# Patient Record
Sex: MALE | Race: WHITE | NOT HISPANIC OR LATINO | Employment: FULL TIME | URBAN - METROPOLITAN AREA
[De-identification: names, ages, dates, MRNs, and addresses within clinical notes are randomized per-mention and may not be internally consistent; named-entity substitution may affect disease eponyms.]

---

## 2018-03-26 ENCOUNTER — PATIENT MESSAGE (OUTPATIENT)
Dept: INTERNAL MEDICINE | Facility: CLINIC | Age: 63
End: 2018-03-26

## 2018-03-26 DIAGNOSIS — Z00.00 ANNUAL PHYSICAL EXAM: Primary | ICD-10-CM

## 2018-03-26 DIAGNOSIS — Z12.5 ENCOUNTER FOR PROSTATE CANCER SCREENING: ICD-10-CM

## 2018-03-29 ENCOUNTER — LAB VISIT (OUTPATIENT)
Dept: LAB | Facility: HOSPITAL | Age: 63
End: 2018-03-29
Attending: INTERNAL MEDICINE
Payer: COMMERCIAL

## 2018-03-29 DIAGNOSIS — Z00.00 ANNUAL PHYSICAL EXAM: ICD-10-CM

## 2018-03-29 DIAGNOSIS — Z12.5 ENCOUNTER FOR PROSTATE CANCER SCREENING: ICD-10-CM

## 2018-03-29 LAB
ALBUMIN SERPL BCP-MCNC: 4.1 G/DL
ALP SERPL-CCNC: 107 U/L
ALT SERPL W/O P-5'-P-CCNC: 34 U/L
ANION GAP SERPL CALC-SCNC: 8 MMOL/L
AST SERPL-CCNC: 20 U/L
BASOPHILS # BLD AUTO: 0.03 K/UL
BASOPHILS NFR BLD: 0.5 %
BILIRUB SERPL-MCNC: 0.6 MG/DL
BUN SERPL-MCNC: 21 MG/DL
CALCIUM SERPL-MCNC: 10.2 MG/DL
CHLORIDE SERPL-SCNC: 106 MMOL/L
CHOLEST SERPL-MCNC: 236 MG/DL
CHOLEST/HDLC SERPL: 4.1 {RATIO}
CO2 SERPL-SCNC: 28 MMOL/L
COMPLEXED PSA SERPL-MCNC: 0.76 NG/ML
CREAT SERPL-MCNC: 0.9 MG/DL
DIFFERENTIAL METHOD: NORMAL
EOSINOPHIL # BLD AUTO: 0.3 K/UL
EOSINOPHIL NFR BLD: 4.7 %
ERYTHROCYTE [DISTWIDTH] IN BLOOD BY AUTOMATED COUNT: 13.1 %
EST. GFR  (AFRICAN AMERICAN): >60 ML/MIN/1.73 M^2
EST. GFR  (NON AFRICAN AMERICAN): >60 ML/MIN/1.73 M^2
GLUCOSE SERPL-MCNC: 103 MG/DL
HCT VFR BLD AUTO: 44.3 %
HDLC SERPL-MCNC: 58 MG/DL
HDLC SERPL: 24.6 %
HGB BLD-MCNC: 15 G/DL
LDLC SERPL CALC-MCNC: 157.4 MG/DL
LYMPHOCYTES # BLD AUTO: 1.5 K/UL
LYMPHOCYTES NFR BLD: 24.3 %
MCH RBC QN AUTO: 30.4 PG
MCHC RBC AUTO-ENTMCNC: 33.9 G/DL
MCV RBC AUTO: 90 FL
MONOCYTES # BLD AUTO: 0.5 K/UL
MONOCYTES NFR BLD: 8.3 %
NEUTROPHILS # BLD AUTO: 3.8 K/UL
NEUTROPHILS NFR BLD: 62.2 %
NONHDLC SERPL-MCNC: 178 MG/DL
PLATELET # BLD AUTO: 264 K/UL
PMV BLD AUTO: 9.2 FL
POTASSIUM SERPL-SCNC: 4.8 MMOL/L
PROT SERPL-MCNC: 7.4 G/DL
RBC # BLD AUTO: 4.94 M/UL
SODIUM SERPL-SCNC: 142 MMOL/L
TRIGL SERPL-MCNC: 103 MG/DL
TSH SERPL DL<=0.005 MIU/L-ACNC: 1.94 UIU/ML
WBC # BLD AUTO: 6.12 K/UL

## 2018-03-29 PROCEDURE — 84443 ASSAY THYROID STIM HORMONE: CPT

## 2018-03-29 PROCEDURE — 84153 ASSAY OF PSA TOTAL: CPT

## 2018-03-29 PROCEDURE — 36415 COLL VENOUS BLD VENIPUNCTURE: CPT

## 2018-03-29 PROCEDURE — 80061 LIPID PANEL: CPT

## 2018-03-29 PROCEDURE — 80053 COMPREHEN METABOLIC PANEL: CPT

## 2018-03-29 PROCEDURE — 85025 COMPLETE CBC W/AUTO DIFF WBC: CPT

## 2018-04-03 ENCOUNTER — HOSPITAL ENCOUNTER (OUTPATIENT)
Dept: RADIOLOGY | Facility: HOSPITAL | Age: 63
Discharge: HOME | End: 2018-04-03
Attending: INTERNAL MEDICINE
Payer: COMMERCIAL

## 2018-04-03 ENCOUNTER — OFFICE VISIT (OUTPATIENT)
Dept: INTERNAL MEDICINE | Facility: CLINIC | Age: 63
End: 2018-04-03
Payer: COMMERCIAL

## 2018-04-03 VITALS
WEIGHT: 231.06 LBS | BODY MASS INDEX: 29.65 KG/M2 | HEIGHT: 74 IN | SYSTOLIC BLOOD PRESSURE: 160 MMHG | HEART RATE: 69 BPM | DIASTOLIC BLOOD PRESSURE: 79 MMHG

## 2018-04-03 DIAGNOSIS — I10 ESSENTIAL HYPERTENSION: ICD-10-CM

## 2018-04-03 DIAGNOSIS — Z12.11 SCREEN FOR COLON CANCER: ICD-10-CM

## 2018-04-03 DIAGNOSIS — Z00.00 ANNUAL PHYSICAL EXAM: Primary | ICD-10-CM

## 2018-04-03 DIAGNOSIS — M25.561 ACUTE PAIN OF RIGHT KNEE: ICD-10-CM

## 2018-04-03 DIAGNOSIS — E78.00 PURE HYPERCHOLESTEROLEMIA: ICD-10-CM

## 2018-04-03 PROCEDURE — 93005 ELECTROCARDIOGRAM TRACING: CPT | Mod: S$GLB,,, | Performed by: INTERNAL MEDICINE

## 2018-04-03 PROCEDURE — 73560 X-RAY EXAM OF KNEE 1 OR 2: CPT | Mod: 26,RT,, | Performed by: RADIOLOGY

## 2018-04-03 PROCEDURE — 93010 ELECTROCARDIOGRAM REPORT: CPT | Mod: S$GLB,,, | Performed by: INTERNAL MEDICINE

## 2018-04-03 PROCEDURE — 99396 PREV VISIT EST AGE 40-64: CPT | Mod: S$GLB,,, | Performed by: INTERNAL MEDICINE

## 2018-04-03 PROCEDURE — 99999 PR PBB SHADOW E&M-EST. PATIENT-LVL III: CPT | Mod: PBBFAC,,, | Performed by: INTERNAL MEDICINE

## 2018-04-03 PROCEDURE — 73560 X-RAY EXAM OF KNEE 1 OR 2: CPT | Mod: TC,FY,PO,RT
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
## 2018-04-03 NOTE — PROGRESS NOTES
No fracture was seen      a calcification was seen that was consistent with enthesophyte    which is a calcification along the tendon around the kneecap       no further interventions needed other than you can take Advil or Aleve as needed for pain or that of a cold pack over the area

## 2018-10-02 ENCOUNTER — LAB VISIT (OUTPATIENT)
Dept: LAB | Facility: HOSPITAL | Age: 63
End: 2018-10-02
Attending: INTERNAL MEDICINE
Payer: COMMERCIAL

## 2018-10-02 DIAGNOSIS — E78.00 PURE HYPERCHOLESTEROLEMIA: ICD-10-CM

## 2018-10-02 DIAGNOSIS — I10 ESSENTIAL HYPERTENSION: ICD-10-CM

## 2018-10-02 LAB
ANION GAP SERPL CALC-SCNC: 11 MMOL/L
BUN SERPL-MCNC: 21 MG/DL
CALCIUM SERPL-MCNC: 9.6 MG/DL
CHLORIDE SERPL-SCNC: 106 MMOL/L
CHOLEST SERPL-MCNC: 249 MG/DL
CHOLEST/HDLC SERPL: 4.3 {RATIO}
CO2 SERPL-SCNC: 24 MMOL/L
CREAT SERPL-MCNC: 0.9 MG/DL
EST. GFR  (AFRICAN AMERICAN): >60 ML/MIN/1.73 M^2
EST. GFR  (NON AFRICAN AMERICAN): >60 ML/MIN/1.73 M^2
GLUCOSE SERPL-MCNC: 97 MG/DL
HDLC SERPL-MCNC: 58 MG/DL
HDLC SERPL: 23.3 %
LDLC SERPL CALC-MCNC: 167.4 MG/DL
NONHDLC SERPL-MCNC: 191 MG/DL
POTASSIUM SERPL-SCNC: 4.4 MMOL/L
SODIUM SERPL-SCNC: 141 MMOL/L
TRIGL SERPL-MCNC: 118 MG/DL

## 2018-10-02 PROCEDURE — 36415 COLL VENOUS BLD VENIPUNCTURE: CPT

## 2018-10-02 PROCEDURE — 80048 BASIC METABOLIC PNL TOTAL CA: CPT

## 2018-10-02 PROCEDURE — 80061 LIPID PANEL: CPT

## 2018-10-25 ENCOUNTER — PATIENT MESSAGE (OUTPATIENT)
Dept: ADMINISTRATIVE | Facility: OTHER | Age: 63
End: 2018-10-25

## 2018-10-26 ENCOUNTER — OFFICE VISIT (OUTPATIENT)
Dept: INTERNAL MEDICINE | Facility: CLINIC | Age: 63
End: 2018-10-26
Payer: COMMERCIAL

## 2018-10-26 VITALS
BODY MASS INDEX: 29.13 KG/M2 | OXYGEN SATURATION: 98 % | DIASTOLIC BLOOD PRESSURE: 77 MMHG | WEIGHT: 227 LBS | HEIGHT: 74 IN | HEART RATE: 62 BPM | SYSTOLIC BLOOD PRESSURE: 120 MMHG

## 2018-10-26 DIAGNOSIS — I10 ESSENTIAL HYPERTENSION: Primary | ICD-10-CM

## 2018-10-26 DIAGNOSIS — E78.00 PURE HYPERCHOLESTEROLEMIA: ICD-10-CM

## 2018-10-26 PROCEDURE — 3008F BODY MASS INDEX DOCD: CPT | Mod: CPTII,S$GLB,, | Performed by: INTERNAL MEDICINE

## 2018-10-26 PROCEDURE — 3074F SYST BP LT 130 MM HG: CPT | Mod: CPTII,S$GLB,, | Performed by: INTERNAL MEDICINE

## 2018-10-26 PROCEDURE — 99999 PR PBB SHADOW E&M-EST. PATIENT-LVL III: CPT | Mod: PBBFAC,,, | Performed by: INTERNAL MEDICINE

## 2018-10-26 PROCEDURE — 3078F DIAST BP <80 MM HG: CPT | Mod: CPTII,S$GLB,, | Performed by: INTERNAL MEDICINE

## 2018-10-26 PROCEDURE — 99214 OFFICE O/P EST MOD 30 MIN: CPT | Mod: S$GLB,,, | Performed by: INTERNAL MEDICINE

## 2018-10-26 NOTE — PROGRESS NOTES
PAST MEDICAL HISTORY:  Hypertension.  Hyperlipidemia.  Fatty liver.  Lumbar degenerative disc disease.    SOCIAL HISTORY:  Tobacco use, none.  Alcohol use, none.  , has two   children.  Works in the oil field business.  Exercises by walking, but it has   been limited.    FAMILY HISTORY:  Mother is , pancreatic and liver cancer.  Father is   still living, bypass surgery.  Two sisters, both with hyperlipidemia.  One   daughter with diabetes.    SCREENING:  Last colonoscopy in .    MEDICATIONS:  None.          REASON FOR VISIT:  This is a 63-year-old male who comes in for a followup visit.    He seems to be sporadic in regard to exercise and diet habits.  Also, later   today, he will be meeting with the Digital Hypertension Medicine Program.    On recent labs, his chemistry was fine, but his cholesterol still remains   elevated at 249 with .    Again, he emphatically states that he is not into taking medication and does not   want to be on medicine for both blood pressure and cholesterol at present.  He   also in an emphatic way is saying that he and his wife are definitely now   getting onto a better diet that they will be starting.    SYSTEMS REVIEW:  Endorses no pains in the chest, shortness of breath or   abdominal pain.  Regular bowel function.  There has been no difficulty   urinating.  Does report snoring by his wife, nothing been observed with apnea,   but he is not fatigued, tired, somnolent upon awakened or as the day goes on.    PHYSICAL EXAMINATION:  VITAL SIGNS:  Weight is 227 pounds, pulse 64, blood pressure by me 158/78,   similar.  NECK:  No thyromegaly.  LUNGS:  Clear.  HEART:  Regular rate and rhythm.  ABDOMEN:  Active bowel sounds, soft, nontender.  No hepatosplenomegaly or   abdominal masses.  PULSES:  2+ carotid pulses, no bruits.  EXTREMITIES:  No edema.    IMPRESSION:  1.  Hypertension.  2.  Hyperlipidemia.    PLAN:  Follow up with the Digital Hypertension Medicine  Program, see how his   blood pressure readings are as he goes about his normal routine.  Again,   discussed the importance of trying to correct the cholesterol and dietary habits   were discussed.  Return again in six months.        /anh [id number] review        DERECK/SIM  dd: 10/26/2018 08:31:04 (CDT)  td: 10/26/2018 21:12:10 (CDT)  Doc ID   #[id number]  Job ID #[id number]    CC:

## 2018-11-09 ENCOUNTER — PATIENT OUTREACH (OUTPATIENT)
Dept: OTHER | Facility: OTHER | Age: 63
End: 2018-11-09

## 2018-11-09 NOTE — PROGRESS NOTES
Last 5 Patient Entered Readings                                      Current 30 Day Average: 133/75     Recent Readings 11/9/2018 11/8/2018 11/7/2018 11/6/2018 11/5/2018    SBP (mmHg) 132 116 124 118 144    DBP (mmHg) 69 67 67 81 73    Pulse 55 62 61 61 53        11/9-Initial enrollment completed. Confirmed proper BP technique, timing, and body positioning. Patient wants to stay off BP medication as much as possible-he is currently not prescribed BP med. Welcome letter sent.

## 2018-11-09 NOTE — LETTER
Melania Todd PharmD  [address] [place] [address]     Dear Leonardo Pisano,    Welcome to the [facility]!           My name is Melania Todd PharmD and I am your dedicated Digital Medicine clinician.  As an expert in medication management, I will help ensure that the medications you are taking continue to provide you with the intended benefits.        I am Patric Marie and I will be your health  for the duration of the program.  My  job is to help you identify lifestyle changes to improve your blood pressure control.  We will talk about nutrition, exercise, and other ways that you may be able to adjust your current habits to better your health. Together, we will work to improve your overall health and encourage you to meet your goals for a healthier lifestyle.    What we expect from YOU:    You will need to take blood pressure readings multiple times a week and no less than one reading per week.   It is important that you take your measurements at different times during the day, when possible.     What you should expect from your Digital Medicine Care Team:   We will provide you with education about high blood pressure, including lifestyle changes that could help you to control your blood pressure.   We will review your weekly readings and provide you with monthly blood pressure progress reports after you have been in the program for more than 30 days.   We will send monthly progress reports on your blood pressure control to your physician so they can follow along with your progress as well.    You will be able to reach me by phone at  or through your [facility] account by clicking my name under Care Team on the right side of the home screen.    I look forward to working with you to achieve your blood pressure goals!    Sincerely,    Melania Todd PharmD  Your personal clinician    Please visit [email or web address] to learn  more about high blood pressure and what you can do lower your blood pressure.                                                                                           Leonardo Pisano  [address]  [place] [address]

## 2018-11-21 ENCOUNTER — TELEPHONE (OUTPATIENT)
Dept: ENDOSCOPY | Facility: HOSPITAL | Age: 63
End: 2018-11-21

## 2018-11-26 ENCOUNTER — PATIENT OUTREACH (OUTPATIENT)
Dept: OTHER | Facility: OTHER | Age: 63
End: 2018-11-26

## 2018-11-26 NOTE — PROGRESS NOTES
Last 5 Patient Entered Readings                                      Current 30 Day Average: 137/74     Recent Readings 11/26/2018 11/25/2018 11/20/2018 11/19/2018 11/18/2018    SBP (mmHg) 132 142 168 160 141    DBP (mmHg) 70 77 77 77 65    Pulse 57 60 55 58 58          Digital Medicine: Health  Introduction Call     11/26: Left voicemail and requested call back in order to complete digital medicine health  introduction call.

## 2018-12-03 NOTE — PROGRESS NOTES
"Last 5 Patient Entered Readings                                      Current 30 Day Average: 137/73     Recent Readings 12/2/2018 11/30/2018 11/29/2018 11/28/2018 11/27/2018    SBP (mmHg) 118 125 151 141 132    DBP (mmHg) 73 70 75 75 76    Pulse 66 68 59 57 61          Digital Medicine: Health  Introduction    Lifestyle Modifications:    1.Dietary Modifications (Sodium intake <2,000mg/day, food labels, dining out):   Pt reports that he has "about limited sodium from his diet". Pt reports he "does not add any salt and looks at all of the food labels he is eating." Pt states "he and his wife are doing it together".      2.Physical Activity:   Pt states "he walks in the evenings for half an hour when the weather allows".  Pt states he might a join or "get a piece of equipment for the house".      3.Medication Therapy:   N/A    4.Patient has the following medication side effects/concerns: None  (Frequency/Alleviating factors/Precipitating factors, etc.)     Follow up with Mr. Leonardo Pisano completed. No further questions or concerns. Will continue to follow up to achieve health goals.  Patient is currently not at goal, 137/73 mmHg does exceed <130/80 mmHg.      "

## 2018-12-05 ENCOUNTER — PATIENT OUTREACH (OUTPATIENT)
Dept: OTHER | Facility: OTHER | Age: 63
End: 2018-12-05

## 2018-12-05 NOTE — PROGRESS NOTES
"Last 5 Patient Entered Readings                                      Current 30 Day Average: 137/74     Recent Readings 12/5/2018 12/4/2018 12/3/2018 12/3/2018 12/2/2018    SBP (mmHg) 130 136 145 150 118    DBP (mmHg) 75 74 84 78 73    Pulse 60 58 53 55 66        Called patient for digital medicine clinical pharmacist introduction. Patient is not currently on BP medications and says "I hope to avoid it all costs." He currently takes his BP in the mornings and took it a few times right after a cup of coffee.     1) Discussed proper BP monitoring technique and BP goal  2) Patient knows to contact me with any non-urgent clinical changes or concerns. Go to ED or call [facility] for emergencies.   3) Will defer lifestyle counseling to digital medicine health .   4) Will continue to follow up.     Melania Todd, Pharm.D.   Digital Medicine Clinical Pharmacist  [phone]    "

## 2018-12-28 ENCOUNTER — PATIENT OUTREACH (OUTPATIENT)
Dept: OTHER | Facility: OTHER | Age: 63
End: 2018-12-28

## 2018-12-28 NOTE — PROGRESS NOTES
Last 5 Patient Entered Readings                                      Current 30 Day Average: 138/74     Recent Readings 12/26/2018 12/22/2018 12/20/2018 12/19/2018 12/19/2018    SBP (mmHg) 148 143 144 145 151    DBP (mmHg) 75 77 74 75 80    Pulse 59 58 55 57 51          Digital Medicine: Health  Follow Up    12/28: Left voicemail to follow up with Mr. Leonardo Pisano.  Current BP average 138/74 mmHg is not at goal, <130/80 mmHg.

## 2019-01-07 NOTE — PROGRESS NOTES
"Last 5 Patient Entered Readings                                      Current 30 Day Average: 140/73     Recent Readings 1/4/2019 1/1/2019 12/31/2018 12/26/2018 12/22/2018    SBP (mmHg) 151 146 132 148 143    DBP (mmHg) 75 77 72 75 77    Pulse 59 56 53 59 58          Digital Medicine: Health  Follow Up    Encouraged patient to make sure he is relaxing for 5-10 minutes before taking his BP readings.  Patient states "that is really hard to do".  Patient states he went hunting for a couple of days and came back and his BP was "in the 130s".     Lifestyle Modifications:    1.Dietary Modifications (Sodium intake <2,000mg/day, food labels, dining out):   Patient reports going to restaurants and asking for the food to be "cooked with less salt".  Patient reports he is "getting used to bland food".  I encouraged patient to continue restricting sodium.     2.Physical Activity:   Patient reports he bought a treadmill for the when the weather is bad "he can still put in some mileage".  I encouraged patient to continue to be active.      3.Medication Therapy:   None    4.Patient has the following medication side effects/concerns: None  (Frequency/Alleviating factors/Precipitating factors, etc.)     Follow up with Mr. Leonardo Pisano completed. No further questions or concerns. Will continue to follow up to achieve health goals.  Patient is currently not at goal, 140/73 mmHg does exceed <130/80 mmHg.    "

## 2019-01-08 NOTE — PROGRESS NOTES
Last 5 Patient Entered Readings                                      Current 30 Day Average: 140/73     Recent Readings 1/4/2019 1/1/2019 12/31/2018 12/26/2018 12/22/2018    SBP (mmHg) 151 146 132 148 143    DBP (mmHg) 75 77 72 75 77    Pulse 59 56 53 59 58        BP remains above goal of <130/<80. Patient has been working with health . Will continue to follow up and discuss starting BP medication.     All AnnaD.   [employer] Medicine Clinical Pharmacist  [phone]

## 2019-02-04 ENCOUNTER — PATIENT OUTREACH (OUTPATIENT)
Dept: OTHER | Facility: OTHER | Age: 64
End: 2019-02-04

## 2019-02-04 NOTE — PROGRESS NOTES
"Last 5 Patient Entered Readings                                      Current 30 Day Average: 137/77     Recent Readings 2/4/2019 2/4/2019 2/2/2019 2/1/2019 1/31/2019    SBP (mmHg) 141 161 138 143 127    DBP (mmHg) 77 82 84 76 76    Pulse 59 55 63 58 61          Digital Medicine: Health  Follow Up    Patient states he could "wait and relax longer" before taking his readings and reports he notices that when he does not the readings are higher.  Reminded patient to make sure to sit and relax 5-10 minutes before taking BP readings.     Lifestyle Modifications:    1.Dietary Modifications (Sodium intake <2,000mg/day, food labels, dining out):   Patient reports continuing to limit salt within his diet.  Pt denies needing any other help with nutrition at this time.    2.Physical Activity:   Patient reports he is walking "on a consistent basis" for about 3 weeks now. Patient states "he does a couple miles after dinner" every night.  Patient reports he even "jogs a little bit" and does intervals.  Encouraged patient to keep up the great work.      3.Medication Therapy:   Patient has been compliant with the medication regimen.    4.Patient has the following medication side effects/concerns: None  (Frequency/Alleviating factors/Precipitating factors, etc.)     Follow up with Mr. Leonardo HATFIELD Zohrehsylvester completed. No further questions or concerns. Will continue to follow up to achieve health goals.  Patient is currently not at goal, 137/77 mmHg does exceed <130/80 mmHg.'    "

## 2019-02-28 ENCOUNTER — PATIENT OUTREACH (OUTPATIENT)
Dept: OTHER | Facility: OTHER | Age: 64
End: 2019-02-28

## 2019-02-28 NOTE — PROGRESS NOTES
Last 5 Patient Entered Readings                                      Current 30 Day Average: 137/75     Recent Readings 2/28/2019 2/28/2019 2/27/2019 2/26/2019 2/24/2019    SBP (mmHg) 140 138 132 137 131    DBP (mmHg) 73 73 71 81 76    Pulse 64 62 62 61 59        Called patient for BP follow up. No answer. Left message for call back    Melania Reza, Pharm.D.   [email or web address] Medicine Clinical Pharmacist  [phone]

## 2019-02-28 NOTE — PROGRESS NOTES
Last 5 Patient Entered Readings                                      Current 30 Day Average: 137/75     Recent Readings 2/28/2019 2/28/2019 2/27/2019 2/26/2019 2/24/2019    SBP (mmHg) 140 138 132 137 131    DBP (mmHg) 73 73 71 81 76    Pulse 64 62 62 61 59        Patient returned my call for BP follow up. He is doing well. He is following a low sodium diet and walking ~30 min almost every evening. He prefers to continue with lifestyle changes for at least another month before considering medications to control BP.     1) BP exceeds goal of <130/<80.   2) Patient knows to contact me with any non-urgent clinical changes or concerns. Go to ED or call [facility] for emergencies.   3) Will defer lifestyle counseling to digital medicine health . Continue low sodium diet and exercise  4) Will continue to follow up.     Melania Todd, Pharm.D.   Digital Medicine Clinical Pharmacist  [phone]

## 2019-03-11 ENCOUNTER — PATIENT OUTREACH (OUTPATIENT)
Dept: OTHER | Facility: OTHER | Age: 64
End: 2019-03-11

## 2019-03-11 NOTE — PROGRESS NOTES
Last 5 Patient Entered Readings                                      Current 30 Day Average: 134/74     Recent Readings 3/10/2019 3/9/2019 3/8/2019 3/7/2019 3/4/2019    SBP (mmHg) 124 132 127 136 141    DBP (mmHg) 71 78 69 79 75    Pulse 57 56 58 59 60        3/11: Left voicemail to follow up with Mr. Leonardo Pisano.  Current BP average 134/74 mmHg is not at goal, <130/80 mmHg.  Patient called back.      Digital Medicine: Health  Follow Up    BP continues to trend downward.  Encounter also consisted of talking about patient's wife and her recent medication change, who is also in the program and my patient. Will notify PharmD.      Lifestyle Modifications:    1.Dietary Modifications (Sodium intake <2,000mg/day, food labels, dining out):   Pt denies needing any other help with nutrition at this time.    2.Physical Activity:   Patient reports he has been walking/jogging for 30 minutes every evening.  Gave praises and encouragement to keep up the great work.     3.Medication Therapy:   None    4.Patient has the following medication side effects/concerns: None  (Frequency/Alleviating factors/Precipitating factors, etc.)     Follow up with Mr. Leonardo Pisano completed. No further questions or concerns. Will continue to follow up to achieve health goals.  Patient is currently not at goal, 134/74 mmHg does exceed <130/80 mmHg.

## 2019-04-15 ENCOUNTER — PATIENT OUTREACH (OUTPATIENT)
Dept: OTHER | Facility: OTHER | Age: 64
End: 2019-04-15

## 2019-04-15 NOTE — PROGRESS NOTES
"Last 5 Patient Entered Readings                                      Current 30 Day Average: 132/75     Recent Readings 4/15/2019 4/14/2019 4/14/2019 4/13/2019 4/13/2019    SBP (mmHg) 130 125 138 126 141    DBP (mmHg) 72 71 63 78 81    Pulse 55 58 57 57 58        4/15: Unable to leave voicemail to follow up with Mr. Leonardo Pisano.  Current BP average 132/75 mmHg is not at goal, <130/80 mmHg.  Sending [facility] message.  Patient called back and left VM. Returned call.      Digital Medicine: Health  Follow Up    Patient reports "he will be impatient" and not wait long enough to take his BP.  He will get a higher reading, then rest a little longer and get a lower reading.  Encouraged patient to continue to try to rest and relax before taking his BP readings.  Patient acknowledged.     Lifestyle Modifications:    1.Dietary Modifications (Sodium intake <2,000mg/day, food labels, dining out):   Patient reports he is continuing to make healthier choices.     2.Physical Activity:   Patient reports he continues to try to exercise daily.  Encouraged patient to stay consistent and keep up the great work.     3.Medication Therapy:   None    4.Patient has the following medication side effects/concerns: None  (Frequency/Alleviating factors/Precipitating factors, etc.)     Follow up with Mr. Leonardo Pisano completed. No further questions or concerns. Will continue to follow up to achieve health goals.  Patient is currently not at goal, 132/75 mmHg does exceed <130/80 mmHg.            "

## 2019-04-22 ENCOUNTER — PATIENT MESSAGE (OUTPATIENT)
Dept: INTERNAL MEDICINE | Facility: CLINIC | Age: 64
End: 2019-04-22

## 2019-04-22 NOTE — TELEPHONE ENCOUNTER
Use the lab orders that were put in October 26, 2018    Also at the [facility] in [place]

## 2019-04-25 ENCOUNTER — LAB VISIT (OUTPATIENT)
Dept: LAB | Facility: HOSPITAL | Age: 64
End: 2019-04-25
Attending: INTERNAL MEDICINE
Payer: COMMERCIAL

## 2019-04-25 DIAGNOSIS — E78.00 PURE HYPERCHOLESTEROLEMIA: ICD-10-CM

## 2019-04-25 DIAGNOSIS — I10 ESSENTIAL HYPERTENSION: ICD-10-CM

## 2019-04-25 LAB
ALBUMIN SERPL BCP-MCNC: 4 G/DL (ref 3.5–5.2)
ALP SERPL-CCNC: 106 U/L (ref 55–135)
ALT SERPL W/O P-5'-P-CCNC: 31 U/L (ref 10–44)
ANION GAP SERPL CALC-SCNC: 10 MMOL/L (ref 8–16)
AST SERPL-CCNC: 18 U/L (ref 10–40)
BASOPHILS # BLD AUTO: 0.02 K/UL (ref 0–0.2)
BASOPHILS NFR BLD: 0.3 % (ref 0–1.9)
BILIRUB SERPL-MCNC: 0.6 MG/DL (ref 0.1–1)
BUN SERPL-MCNC: 19 MG/DL (ref 8–23)
CALCIUM SERPL-MCNC: 9.7 MG/DL (ref 8.7–10.5)
CHLORIDE SERPL-SCNC: 108 MMOL/L (ref 95–110)
CHOLEST SERPL-MCNC: 218 MG/DL (ref 120–199)
CHOLEST/HDLC SERPL: 4 {RATIO} (ref 2–5)
CO2 SERPL-SCNC: 24 MMOL/L (ref 23–29)
COMPLEXED PSA SERPL-MCNC: 0.61 NG/ML (ref 0–4)
CREAT SERPL-MCNC: 0.9 MG/DL (ref 0.5–1.4)
DIFFERENTIAL METHOD: ABNORMAL
EOSINOPHIL # BLD AUTO: 0.3 K/UL (ref 0–0.5)
EOSINOPHIL NFR BLD: 4.8 % (ref 0–8)
ERYTHROCYTE [DISTWIDTH] IN BLOOD BY AUTOMATED COUNT: 13.3 % (ref 11.5–14.5)
EST. GFR  (AFRICAN AMERICAN): >60 ML/MIN/1.73 M^2
EST. GFR  (NON AFRICAN AMERICAN): >60 ML/MIN/1.73 M^2
GLUCOSE SERPL-MCNC: 99 MG/DL (ref 70–110)
HCT VFR BLD AUTO: 43.5 % (ref 40–54)
HDLC SERPL-MCNC: 55 MG/DL (ref 40–75)
HDLC SERPL: 25.2 % (ref 20–50)
HGB BLD-MCNC: 14.5 G/DL (ref 14–18)
LDLC SERPL CALC-MCNC: 142.6 MG/DL (ref 63–159)
LYMPHOCYTES # BLD AUTO: 1.8 K/UL (ref 1–4.8)
LYMPHOCYTES NFR BLD: 28.1 % (ref 18–48)
MCH RBC QN AUTO: 30.4 PG (ref 27–31)
MCHC RBC AUTO-ENTMCNC: 33.3 G/DL (ref 32–36)
MCV RBC AUTO: 91 FL (ref 82–98)
MONOCYTES # BLD AUTO: 0.5 K/UL (ref 0.3–1)
MONOCYTES NFR BLD: 7.9 % (ref 4–15)
NEUTROPHILS # BLD AUTO: 3.8 K/UL (ref 1.8–7.7)
NEUTROPHILS NFR BLD: 58.9 % (ref 38–73)
NONHDLC SERPL-MCNC: 163 MG/DL
PLATELET # BLD AUTO: 242 K/UL (ref 150–350)
PMV BLD AUTO: 9.1 FL (ref 9.2–12.9)
POTASSIUM SERPL-SCNC: 4.6 MMOL/L (ref 3.5–5.1)
PROT SERPL-MCNC: 7 G/DL (ref 6–8.4)
RBC # BLD AUTO: 4.77 M/UL (ref 4.6–6.2)
SODIUM SERPL-SCNC: 142 MMOL/L (ref 136–145)
TRIGL SERPL-MCNC: 102 MG/DL (ref 30–150)
TSH SERPL DL<=0.005 MIU/L-ACNC: 2.53 UIU/ML (ref 0.4–4)
WBC # BLD AUTO: 6.44 K/UL (ref 3.9–12.7)

## 2019-04-25 PROCEDURE — 80053 COMPREHEN METABOLIC PANEL: CPT

## 2019-04-25 PROCEDURE — 36415 COLL VENOUS BLD VENIPUNCTURE: CPT

## 2019-04-25 PROCEDURE — 85025 COMPLETE CBC W/AUTO DIFF WBC: CPT

## 2019-04-25 PROCEDURE — 80061 LIPID PANEL: CPT

## 2019-04-25 PROCEDURE — 84153 ASSAY OF PSA TOTAL: CPT

## 2019-04-25 PROCEDURE — 84443 ASSAY THYROID STIM HORMONE: CPT

## 2019-04-25 NOTE — PROGRESS NOTES
PAST MEDICAL HISTORY:  Hypertension.  Hyperlipidemia.  Fatty liver.  Lumbar degenerative disc disease.     SOCIAL HISTORY:  Tobacco use, none.  Alcohol use, none.  , has two children.  Works in the oil field business.  Exercises by walking, but it has been limited.     FAMILY HISTORY:  Mother is , pancreatic and liver cancer.  Father is still living, bypass surgery.  Two sisters, both with hyperlipidemia.  One daughter with diabetes.     SCREENING:  Last colonoscopy in .        REASON FOR VISIT:  This is a 64-year-old male who comes in for an annual routine   visit.  In general, he is feeling well.    He is followed by the Digital Hypertension Medicine program where his systolic   readings were in the 120 to 130s, diastolics in the 70s.    Thing that he brings up is that he feels that his lumbar back situation might be   getting worse where he is feeling more discomfort on consistent basis and wants   to bend over.  He also if he is standing up for any prolonged period is when he   will notice it.  He has had an epidural steroid injection before.    RECENT LABS:  PSA, TSH, CBC, chemistry, normal.  Total cholesterol 218 with LDL   142.    REVIEW OF SYMPTOMS:  Endorses no chest pain, palpitations, shortness of breath,   abdominal pain, regular bowel function.  No difficulty urinating.  No headaches,   indigestion or heartburn.    PHYSICAL EXAMINATION:  VITAL SIGNS:  Weight is 226 pounds, pulse 60, blood pressure 144/76.  HEENT:  Tympanic membranes normal.  Nasal mucosa is clear.  Oropharynx, no   abnormal findings.  NECK:  No thyromegaly.  No masses.  LUNGS:  Clear breath sounds, good effort.  HEART:  Regular rate and rhythm.  ABDOMEN:  Active bowel sounds, soft, nontender.  No hepatosplenomegaly or   abdominal masses.  Pulses:  2+ carotid pulses.  2+ pedal pulses.  EXTREMITIES:  No edema.  MUSCULOSKELETAL:  2+ knee and ankle jerk reflex.    IMPRESSION:  1. General examination.  2. Hypertension.  3.  Hyperlipidemia.  4. Lumbar spondylosis.    PLAN:  We will arrange for screening colonoscopies, do an x-ray of the lumbar   spine with flexion and extension and also MRI.  I discussed with him the Healthy   Back program and continue with attention to diet and physical activity.    ADDENDUM:  His total cholesterol came back at 218 with , but this has   improved.  He defers on medication.  He states that he is becoming more   attentive and mindful to diet.        KAMAR  dd: 04/26/2019 09:36:11 (CDT)  td: 04/26/2019 18:29:10 (CDT)  Doc ID   #[id number]  Job ID #[id number]    CC:

## 2019-04-26 ENCOUNTER — OFFICE VISIT (OUTPATIENT)
Dept: INTERNAL MEDICINE | Facility: CLINIC | Age: 64
End: 2019-04-26
Payer: COMMERCIAL

## 2019-04-26 ENCOUNTER — HOSPITAL ENCOUNTER (OUTPATIENT)
Dept: RADIOLOGY | Facility: HOSPITAL | Age: 64
Discharge: HOME | End: 2019-04-26
Attending: INTERNAL MEDICINE
Payer: COMMERCIAL

## 2019-04-26 VITALS
HEART RATE: 57 BPM | SYSTOLIC BLOOD PRESSURE: 136 MMHG | WEIGHT: 226 LBS | BODY MASS INDEX: 29 KG/M2 | OXYGEN SATURATION: 99 % | DIASTOLIC BLOOD PRESSURE: 76 MMHG | HEIGHT: 74 IN

## 2019-04-26 DIAGNOSIS — M25.512 CHRONIC LEFT SHOULDER PAIN: ICD-10-CM

## 2019-04-26 DIAGNOSIS — Z12.11 SCREEN FOR COLON CANCER: ICD-10-CM

## 2019-04-26 DIAGNOSIS — M47.816 LUMBAR SPONDYLOSIS: ICD-10-CM

## 2019-04-26 DIAGNOSIS — G89.29 CHRONIC LEFT SHOULDER PAIN: ICD-10-CM

## 2019-04-26 DIAGNOSIS — E78.00 PURE HYPERCHOLESTEROLEMIA: ICD-10-CM

## 2019-04-26 DIAGNOSIS — I10 ESSENTIAL HYPERTENSION: ICD-10-CM

## 2019-04-26 DIAGNOSIS — Z00.00 ANNUAL PHYSICAL EXAM: Primary | ICD-10-CM

## 2019-04-26 PROCEDURE — 3075F SYST BP GE 130 - 139MM HG: CPT | Mod: CPTII,S$GLB,, | Performed by: INTERNAL MEDICINE

## 2019-04-26 PROCEDURE — 99999 PR PBB SHADOW E&M-EST. PATIENT-LVL III: ICD-10-PCS | Mod: PBBFAC,,, | Performed by: INTERNAL MEDICINE

## 2019-04-26 PROCEDURE — 72120 X-RAY BEND ONLY L-S SPINE: CPT | Mod: TC,FY,PO

## 2019-04-26 PROCEDURE — 3078F DIAST BP <80 MM HG: CPT | Mod: CPTII,S$GLB,, | Performed by: INTERNAL MEDICINE

## 2019-04-26 PROCEDURE — 99999 PR PBB SHADOW E&M-EST. PATIENT-LVL III: CPT | Mod: PBBFAC,,, | Performed by: INTERNAL MEDICINE

## 2019-04-26 PROCEDURE — 99396 PR PREVENTIVE VISIT,EST,40-64: ICD-10-PCS | Mod: S$GLB,,, | Performed by: INTERNAL MEDICINE

## 2019-04-26 PROCEDURE — 72120 X-RAY BEND ONLY L-S SPINE: CPT | Mod: 26,,, | Performed by: RADIOLOGY

## 2019-04-26 PROCEDURE — 3078F PR MOST RECENT DIASTOLIC BLOOD PRESSURE < 80 MM HG: ICD-10-PCS | Mod: CPTII,S$GLB,, | Performed by: INTERNAL MEDICINE

## 2019-04-26 PROCEDURE — 3075F PR MOST RECENT SYSTOLIC BLOOD PRESS GE 130-139MM HG: ICD-10-PCS | Mod: CPTII,S$GLB,, | Performed by: INTERNAL MEDICINE

## 2019-04-26 PROCEDURE — 99396 PREV VISIT EST AGE 40-64: CPT | Mod: S$GLB,,, | Performed by: INTERNAL MEDICINE

## 2019-04-26 PROCEDURE — 72100 XR LUMBAR SPINE AP AND LAT WITH FLEX/EXT: ICD-10-PCS | Mod: 26,,, | Performed by: RADIOLOGY

## 2019-04-26 PROCEDURE — 72100 X-RAY EXAM L-S SPINE 2/3 VWS: CPT | Mod: 26,,, | Performed by: RADIOLOGY

## 2019-04-26 PROCEDURE — 72120 XR LUMBAR SPINE AP AND LAT WITH FLEX/EXT: ICD-10-PCS | Mod: 26,,, | Performed by: RADIOLOGY

## 2019-04-29 ENCOUNTER — PATIENT MESSAGE (OUTPATIENT)
Dept: INTERNAL MEDICINE | Facility: CLINIC | Age: 64
End: 2019-04-29

## 2019-05-02 ENCOUNTER — PATIENT MESSAGE (OUTPATIENT)
Dept: INTERNAL MEDICINE | Facility: CLINIC | Age: 64
End: 2019-05-02

## 2019-05-03 ENCOUNTER — HOSPITAL ENCOUNTER (OUTPATIENT)
Dept: RADIOLOGY | Facility: HOSPITAL | Age: 64
Discharge: HOME | End: 2019-05-03
Attending: INTERNAL MEDICINE
Payer: COMMERCIAL

## 2019-05-03 DIAGNOSIS — M47.816 LUMBAR SPONDYLOSIS: ICD-10-CM

## 2019-05-03 PROCEDURE — 72148 MRI LUMBAR SPINE W/O DYE: CPT | Mod: TC

## 2019-05-04 ENCOUNTER — PATIENT MESSAGE (OUTPATIENT)
Dept: INTERNAL MEDICINE | Facility: CLINIC | Age: 64
End: 2019-05-04

## 2019-05-04 DIAGNOSIS — M43.10 PARS DEFECT WITH SPONDYLOLISTHESIS: Primary | ICD-10-CM

## 2019-05-04 NOTE — TELEPHONE ENCOUNTER
Set up referred to the back and spine Orthopedics    Due to the worsening lumbar pain and abnormal MRI with pars defect

## 2019-05-04 NOTE — PROGRESS NOTES
MRI lumbar spine reviewed    At L5-S1, there is subluxation of L5 on S1 with pars defect    Facet arthrosis was noted various degrees of neural foraminal stenosis with possible impingement on right L4 nerve root and left L5 and S1    I feel the main source of pain may be related to the L5/S1 pars defect as well as facet arthrosis    Will put a request to meet with orthopedic spine surgery

## 2019-05-06 ENCOUNTER — TELEPHONE (OUTPATIENT)
Dept: INTERNAL MEDICINE | Facility: CLINIC | Age: 64
End: 2019-05-06

## 2019-05-09 ENCOUNTER — HOSPITAL ENCOUNTER (OUTPATIENT)
Dept: RADIOLOGY | Facility: HOSPITAL | Age: 64
Discharge: HOME | End: 2019-05-09
Attending: ORTHOPAEDIC SURGERY
Payer: COMMERCIAL

## 2019-05-09 ENCOUNTER — PATIENT MESSAGE (OUTPATIENT)
Dept: SPINE | Facility: CLINIC | Age: 64
End: 2019-05-09

## 2019-05-09 ENCOUNTER — OFFICE VISIT (OUTPATIENT)
Dept: SPORTS MEDICINE | Facility: CLINIC | Age: 64
End: 2019-05-09
Payer: COMMERCIAL

## 2019-05-09 VITALS
HEIGHT: 74 IN | DIASTOLIC BLOOD PRESSURE: 82 MMHG | WEIGHT: 226 LBS | HEART RATE: 67 BPM | SYSTOLIC BLOOD PRESSURE: 148 MMHG | BODY MASS INDEX: 29 KG/M2

## 2019-05-09 DIAGNOSIS — M25.512 ACUTE PAIN OF LEFT SHOULDER: ICD-10-CM

## 2019-05-09 DIAGNOSIS — M25.512 ACUTE PAIN OF LEFT SHOULDER: Primary | ICD-10-CM

## 2019-05-09 PROCEDURE — 3008F PR BODY MASS INDEX (BMI) DOCUMENTED: ICD-10-PCS | Mod: CPTII,S$GLB,, | Performed by: ORTHOPAEDIC SURGERY

## 2019-05-09 PROCEDURE — 99999 PR PBB SHADOW E&M-EST. PATIENT-LVL III: ICD-10-PCS | Mod: PBBFAC,,, | Performed by: ORTHOPAEDIC SURGERY

## 2019-05-09 PROCEDURE — 73030 X-RAY EXAM OF SHOULDER: CPT | Mod: TC,FY,PO,LT

## 2019-05-09 PROCEDURE — 73030 XR SHOULDER COMPLETE 2 OR MORE VIEWS LEFT: ICD-10-PCS | Mod: 26,LT,, | Performed by: RADIOLOGY

## 2019-05-09 PROCEDURE — 3008F BODY MASS INDEX DOCD: CPT | Mod: CPTII,S$GLB,, | Performed by: ORTHOPAEDIC SURGERY

## 2019-05-09 PROCEDURE — 3077F SYST BP >= 140 MM HG: CPT | Mod: CPTII,S$GLB,, | Performed by: ORTHOPAEDIC SURGERY

## 2019-05-09 PROCEDURE — 99999 PR PBB SHADOW E&M-EST. PATIENT-LVL III: CPT | Mod: PBBFAC,,, | Performed by: ORTHOPAEDIC SURGERY

## 2019-05-09 PROCEDURE — 99203 OFFICE O/P NEW LOW 30 MIN: CPT | Mod: S$GLB,,, | Performed by: ORTHOPAEDIC SURGERY

## 2019-05-09 PROCEDURE — 73030 X-RAY EXAM OF SHOULDER: CPT | Mod: 26,LT,, | Performed by: RADIOLOGY

## 2019-05-09 PROCEDURE — 3077F PR MOST RECENT SYSTOLIC BLOOD PRESSURE >= 140 MM HG: ICD-10-PCS | Mod: CPTII,S$GLB,, | Performed by: ORTHOPAEDIC SURGERY

## 2019-05-09 PROCEDURE — 3079F PR MOST RECENT DIASTOLIC BLOOD PRESSURE 80-89 MM HG: ICD-10-PCS | Mod: CPTII,S$GLB,, | Performed by: ORTHOPAEDIC SURGERY

## 2019-05-09 PROCEDURE — 3079F DIAST BP 80-89 MM HG: CPT | Mod: CPTII,S$GLB,, | Performed by: ORTHOPAEDIC SURGERY

## 2019-05-09 PROCEDURE — 99203 PR OFFICE/OUTPT VISIT, NEW, LEVL III, 30-44 MIN: ICD-10-PCS | Mod: S$GLB,,, | Performed by: ORTHOPAEDIC SURGERY

## 2019-05-09 NOTE — PROGRESS NOTES
CC: left Shoulder pain    64 y.o. Male reports that the pain is severe and not responding to any conservative care.    No traumatic event. Has hx of right RTC repair with great results  He reports that the pain is worse with overhead activity. It also bothers him at night.  Has not had PT or injections       PAST MEDICAL HISTORY:   Past Medical History:   Diagnosis Date    Degenerative disc disease     Essential hypertension 4/3/2018    Hepatitis     Hyperlipidemia      PAST SURGICAL HISTORY:   Past Surgical History:   Procedure Laterality Date    SHOULDER SURGERY  4/25/12    rt shoulder     FAMILY HISTORY:   Family History   Problem Relation Age of Onset    Cancer Mother         liver    Heart disease Father         cabg    Diabetes Daughter     Hyperlipidemia Sister     Hyperlipidemia Sister      SOCIAL HISTORY:   Social History     Socioeconomic History    Marital status:      Spouse name: Not on file    Number of children: 2    Years of education: Not on file    Highest education level: Not on file   Occupational History     Employer: [employer]   Social Needs    Financial resource strain: Not on file    Food insecurity:     Worry: Not on file     Inability: Not on file    Transportation needs:     Medical: Not on file     Non-medical: Not on file   Tobacco Use    Smoking status: Never Smoker    Smokeless tobacco: Never Used   Substance and Sexual Activity    Alcohol use: No    Drug use: Not on file    Sexual activity: Not on file   Lifestyle    Physical activity:     Days per week: Not on file     Minutes per session: Not on file    Stress: Not on file   Relationships    Social connections:     Talks on phone: Not on file     Gets together: Not on file     Attends Christianity service: Not on file     Active member of club or organization: Not on file     Attends meetings of clubs or organizations: Not on file     Relationship status: Not on file   Other Topics Concern    Not  "on file   Social History Narrative    Not on file       MEDICATIONS: No current outpatient medications on file.  ALLERGIES:   Review of patient's allergies indicates:   Allergen Reactions    No known allergies        VITAL SIGNS: BP (!) 148/82   Pulse 67   Ht 6' 2" (1.88 m)   Wt 102.5 kg (226 lb)   BMI 29.02 kg/m²      Review of Systems   Constitution: Negative. Negative for chills, fever and night sweats.   HENT: Negative for congestion and headaches.    Eyes: Negative for blurred vision, left vision loss and right vision loss.   Cardiovascular: Negative for chest pain and syncope.   Respiratory: Negative for cough and shortness of breath.    Endocrine: Negative for polydipsia, polyphagia and polyuria.   Hematologic/Lymphatic: Negative for bleeding problem. Does not bruise/bleed easily.   Skin: Negative for dry skin, itching and rash.   Musculoskeletal: Negative for falls and muscle weakness.   Gastrointestinal: Negative for abdominal pain and bowel incontinence.   Genitourinary: Negative for bladder incontinence and nocturia.   Neurological: Negative for disturbances in coordination, loss of balance and seizures.   Psychiatric/Behavioral: Negative for depression. The patient does not have insomnia.    Allergic/Immunologic: Negative for hives and persistent infections.       PHYSICAL EXAMINATION:  General:  The patient is alert and oriented x 3.  Mood is pleasant.  Observation of ears, eyes and nose reveal no gross abnormalities.  HEENT: NCAT, sclera nonicteric  Lungs: Respirations are equal and unlabored.  Gait is coordinated. Patient can toe walk and heel walk without difficulty.  Cardiovascular: There are no swelling or varicosities present.   Lymphatic: Negative for adenopathy       left Shoulder / Upper Extremity Exam    OBSERVATION:     Swelling  none  Deformity  none   Discoloration  none   Scapular winging none   Scars   none  Atrophy  none    TENDERNESS / CREPITUS (T/C):      "     T/C      T/C   Clavicle   -/-  SUPRAspinatus    +/-   AC Jt.    -/-  INFRAspinatus  +/-   SC Jt.    -/-  Deltoid    -/-   G. Tuberosity  -/-  LH BICEP groove  -/-   Acromion:  -/-  Midline Neck   -/-   Scapular Spine -/-  Trapezium   -/-   SMA Scapula  -/-  GH jt. line - post  -/-   Scapulothoracic  -/-         ROM: (* = with pain)  Right shoulder   Left shoulder        AROM (PROM)   AROM (PROM)   FE    170° (175°)     170° (175°)  *   ER at 0°    60°  (65°)    60°  (65°)   ER at 90° ABD  90°  (90°)    90°  (90°)*   IR at 90°  ABD   NA  (40°)     NA  (40°)      IR (spine level)   T10     T10    STRENGTH: (* = with pain) RIGHT SHOULDER  LEFT SHOULDER   SCAPTION at 0   5/5    5/5*    SCAPTION at 30   5/5    5/5*    IR    5/5    5/5   ER    5/5    5/5   BICEPS   5/5    5/5   Deltoid    5/5    5/5     SIGNS:  Painful side       NEER   neg    OOSWALDS  Neg   MILTON   neg    SPEEDS  Neg   DROP ARM   neg   BELLY PRESS Neg   Superior escape none    LIFT-OFF  Neg   X-Body ADD    neg    MOVING VALGUS Neg      STABILITY TESTING    RIGHT SHOULDER   LEFT SHOULDER       Translation    Anterior  up face     up face    Posterior  up face    up face    Sulcus   < 10mm    < 10 mm    Signs    Apprehension   neg      Neg    Relocation   no change     no change    Jerk test  neg     Neg      EXTREMITY NEURO-VASCULAR EXAM    Sensation grossly intact to light touch all dermatomal regions.    DTR 2+ Biceps, Triceps, BR and Negative Khalifs sign   Grossly intact motor function at Elbow, Wrist and Hand   Distal pulses radial and ulnar 2+, brisk cap refill, symmetric.      NECK:  Painless FROM and spinous processes non-tender. Negative Spurlings sign.      OTHER FINDINGS:    XRAYS:  Shoulder trauma series left ,  were ordered and reviewed by me. No convincing fracture or dislocation is noted. The osseous structures appear well mineralized and well aligned    1. Shoulder pain, left     Plan:       ASSESSMENT:  shoulder pain.     I do think that this is likely a rotator cuff tear.    I have recommended we check an MRI of the shoulder to evaluate this.    Depending on the results of the MRI, we may consider a cortisone   injection and physical therapy and/or arthroscopic intervention and treatment   depending on what we find.  I will see him back upon its completion or PHREV and we will consider the above.

## 2019-05-09 NOTE — LETTER
May 9, 2019      Jesus Manuel Ortiz MD  [address]  [facility] [address]           [facility]  [address]  [facility] [address]  Phone: [phone]          Patient: Leonardo Pisano   MR Number: [id number]   YOB: 1955   Date of Visit: 5/9/2019       Dear Dr. Jesus Manuel Ortiz:    Thank you for referring Leonardo Pisano to me for evaluation. Attached you will find relevant portions of my assessment and plan of care.    If you have questions, please do not hesitate to call me. I look forward to following Leonardo Pisano along with you.    Sincerely,    Jourdan Muniz MD    Enclosure  CC:  No Recipients    If you would like to receive this communication electronically, please contact [email or web address] or [phone] to request more information on [facility] [employer] access.    For providers and/or their staff who would like to refer a patient to [facility], please contact us through our one-stop-shop provider referral line, [facility] , at [phone].    If you feel you have received this communication in error or would no longer like to receive these types of communications, please e-mail [email or web address]

## 2019-05-15 ENCOUNTER — HOSPITAL ENCOUNTER (OUTPATIENT)
Dept: RADIOLOGY | Facility: HOSPITAL | Age: 64
Discharge: HOME | End: 2019-05-15
Attending: ORTHOPAEDIC SURGERY
Payer: COMMERCIAL

## 2019-05-15 DIAGNOSIS — M25.512 ACUTE PAIN OF LEFT SHOULDER: ICD-10-CM

## 2019-05-15 PROCEDURE — 73221 MRI JOINT UPR EXTREM W/O DYE: CPT | Mod: TC,LT

## 2019-05-15 NOTE — PROGRESS NOTES
Subjective:      Patient ID: Leonardo Pisano is a 64 y.o. male.    Chief Complaint: Low-back Pain    Mr Pisano is a 65 yo male sent in consultation by Dr. Ortiz for evaluation of back  and left leg pain.  He has had back pain for over 50 years, he was told he had a pars defect in 1974.  He has told multiple doctors that told her she did not have pars defect.  He was told in 2011 that he did have a pars defect.  He feels like his pain is getting worse.  He feels like he has a numbness down his legs at times.  He will have sharp pain at times that goes down the leg to the calf.  The pain happened last week and can last a couple of hours the pain is debilitating.  He has an episode every couple of years.  The pain is not constant.  The pain is more with standing and sitting.  He is better with moving, walking, and in recliner.  He will take aleve as needed.  He had injections in 2011, left L4 and L5 TF in may 2011, the next injection was left L4-5, L5-S1.  The second injection did not help.  He feels like he has more numbness in the legs down the back of the legs and left greater than right.  There is no foot numbness.  Pain is 1/10 now, worst 10/10 severe pain last week in left leg for 5 hours, best 0/10.  He has done PT and it did help, but did continue his exercises    MRI lumbar 5/3/2019  The coronal localizer series demonstrates evidence of fairly mild levoconvex alignment of lumbar spine with normal stature and contour of all the vertebral bodies included.  There is no evidence of outstanding pathologic marrow signal.  There is no evidence of bony destructive changes.  The distal thoracic cord and conus are normal in MR appearance.  Evaluation of lower thoracic spine shows evidence of fairly prominent disc herniation at the level of T12/L1 projecting dorsad encroaching upon the distal tip of the thoracic cord.  There is evidence of moderate effaces identified along the anterior aspect of the thoracic cord  without evidence of any significant reactive signal changes identified at the point of impact.    T12/L1: The intervertebral disc appears of normal stature with evidence of satisfactory hydration signal.  There is no evidence of disc protrusion or extrusion.  The AP and the spinal canal appears well maintained.    L1/L2: The intervertebral disc appears diminished in stature with evidence of 5 mm broad-based disc protrusion projecting beyond the posterior vertebral column manifesting mild effacement upon the anterior subarachnoid fluid.  There is no evidence of high-grade spinal canal stenosis.  There is bilateral perineural fat effacement.    L2/L3: The intervertebral disc appears diminished in stature demonstrate evidence of diminished cm in signal.  There is evidence of 4 mm annular disc bulge projecting beyond the posterior vertebral column with mild effacement upon the anterior surface of the thecal sac.  There is no evidence of high-grade spinal canal stenosis.  Neural foraminal spaces are minimally stenotic.    L3/L4: The intervertebral disc appears normal stature demonstrate evidence of a 5 mm broad-based disc protrusion subligamentous in locale projecting beyond the posterior vertebral margin.  There is evidence of mass effect imposed upon the anterior subarachnoid space.  Prominent right-sided neural foraminal narrowing.    L4/L5: The intervertebral disc appears diminished in stature with evidence of 5 mm asymmetrical favors the left side with evidence of contact the traversing L5 nerve root in lateral recess.  There is evidence of bilateral facet arthrosis dominant toward the left.    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  There is elongation spinal canal at this particular locale.  Bilateral facet arthrosis is noted.    Impression      1.   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    2.  Multilevel degenerate disc disease as documented above.    X-ray lumbar 4/2019  There is grade 1 anterolisthesis of L5 on S1 with L5 pars defects and there is mild-moderate DJD.  There is mild wedging of T11-T12.  These appear to be chronic.  There is a levoconvex curvature and possible left-sided nephrolithiasis.    Injections   12/29/2011  left L4/5 and L5/S1 transforaminal epidural steroid injection.  5/20/2011 LEFT L4 AND L5 TRANSFORAMINAL EPIDURAL STEROID     INJECTIONS.  Past Medical History:  No date: Degenerative disc disease  4/3/2018: Essential hypertension  No date: Hepatitis  No date: Hyperlipidemia    Past Surgical History:  4/25/12: SHOULDER SURGERY      Comment:  rt shoulder    Review of patient's family history indicates:  Problem: Cancer      Relation: Mother          Age of Onset: (Not Specified)          Comment: liver  Problem: Heart disease      Relation: Father          Age of Onset: (Not Specified)          Comment: cabg  Problem: Diabetes      Relation: Daughter          Age of Onset: (Not Specified)  Problem: Hyperlipidemia      Relation: Sister          Age of Onset: (Not Specified)  Problem: Hyperlipidemia      Relation: Sister          Age of Onset: (Not Specified)      Social History    Socioeconomic History      Marital status:       Spouse name: Not on file      Number of children: 2      Years of education: Not on file      Highest education level: Not on file    Occupational History        Employer: [employer]    Social Needs      Financial resource strain: Not on file      Food insecurity:        Worry: Not on file        Inability: Not on file      Transportation needs:        Medical: Not on file        Non-medical: Not on file    Tobacco Use      Smoking status: Never Smoker      Smokeless tobacco: Never Used    Substance  and Sexual Activity      Alcohol use: No      Drug use: Not on file      Sexual activity: Not on file    Lifestyle      Physical activity:        Days per week: Not on file        Minutes per session: Not on file      Stress: Not on file    Relationships      Social connections:        Talks on phone: Not on file        Gets together: Not on file        Attends Hinduism service: Not on file        Active member of club or organization: Not on file        Attends meetings of clubs or organizations: Not on file        Relationship status: Not on file    Other Topics      Concerns:        Not on file    Social History Narrative      Not on file      No current outpatient medications on file.  No current facility-administered medications for this visit.       Review of patient's allergies indicates:   -- No known allergies         Review of Systems   Constitution: Negative for weight gain and weight loss.   Cardiovascular: Negative for chest pain.   Respiratory: Negative for shortness of breath.    Musculoskeletal: Positive for back pain (left leg pain). Negative for joint pain and joint swelling.   Gastrointestinal: Negative for abdominal pain, bowel incontinence, nausea and vomiting.   Genitourinary: Negative for bladder incontinence.   Neurological: Positive for numbness (back of the legs at times).         Objective:        General: Leonardo is well-developed, well-nourished, appears stated age, in no acute distress, alert and oriented to time, place and person.     General    Vitals reviewed.  Constitutional: He is oriented to person, place, and time. He appears well-developed and well-nourished.   HENT:   Head: Normocephalic and atraumatic.   Pulmonary/Chest: Effort normal.   Neurological: He is alert and oriented to person, place, and time.   Psychiatric: He has a normal mood and affect. His behavior is normal. Judgment and thought content normal.     General Musculoskeletal Exam   Gait: normal     Right Ankle/Foot  Exam     Tests   Heel Walk: able to perform  Tiptoe Walk: able to perform    Left Ankle/Foot Exam     Tests   Heel Walk: able to perform  Tiptoe Walk: able to perform  Back (L-Spine & T-Spine) / Neck (C-Spine) Exam     Tenderness Left paramedian tenderness of the Sacrum.     Back (L-Spine & T-Spine) Range of Motion   Extension: 20   Flexion: 80   Lateral bend right: 20   Lateral bend left: 20   Rotation right: 40   Rotation left: 40     Spinal Sensation   Right Side Sensation  C-Spine Level: normal   L-Spine Level: normal  S-Spine Level: normal  Left Side Sensation  C-Spine Level: normal  L-Spine Level: normal  S-Spine Level: normal    Back (L-Spine & T-Spine) Tests   Right Side Tests  Straight leg raise:      Sitting SLR: > 70 degrees      Left Side Tests  Straight leg raise:     Sitting SLR: > 70 degrees          Other He has no scoliosis .  Spinal Kyphosis:  Absent    Comments:  No curent pain but points to left SI joint      Muscle Strength   Right Upper Extremity   Biceps: 5/5/5   Deltoid:  5/5  Triceps:  5/5  Wrist extension: 5/5/5   Finger Flexors:  5/5  Left Upper Extremity  Biceps: 5/5/5   Deltoid:  5/5  Triceps:  5/5  Wrist extension: 5/5/5   Finger Flexors:  5/5  Right Lower Extremity   Hip Flexion: 5/5   Quadriceps:  5/5   Anterior tibial:  5/5/5  EHL:  5/5  Left Lower Extremity   Hip Flexion: 5/5   Quadriceps:  5/5   Anterior tibial:  5/5/5   EHL:  5/5    Reflexes     Left Side  Biceps:  2+  Triceps:  2+  Brachioradialis:  2+  Quadriceps:  2+  Achilles:  2+  Left Dickinson's Sign:  Absent  Babinski Sign:  absent    Right Side   Biceps:  2+  Triceps:  2+  Brachioradialis:  2+  Quadriceps:  2+  Achilles:  2+  Right Dickinson's Sign:  absent  Babinski Sign:  absent    Vascular Exam     Right Pulses        Carotid:                  2+    Left Pulses        Carotid:                  2+              Assessment:       1. Spondylolysis of lumbar region    2. DDD (degenerative disc disease), lumbar    3. Chronic  bilateral low back pain without sciatica           Plan:       Orders Placed This Encounter    Ambulatory Referral to Physical/Occupational Therapy     More than 50% of the total time of 45 minutes was spent in counseling on diagnosis and treatment options.  We discussed the MRI of the lumbar spine and the pars defect and DDD.  He has flares of pain.  We discussed a small slip but no instability on x-ray.  We discussed back pain and the nature of back pain.  We discussed that it will likely improve and that it is not one thing that causes the pain but an accumulation of multiple things that we do.  We discussed posture sitting and the importance of trying to sit better.  We discussed the benefits of therapy and exercise and continuing to move.  The pain does not interfere with function daily  1.  PT for back and core strengthening, extension exercises, and myofascial exercises and HEP at [facility] in [place]  2.  Aleve as needed  3.   We did discuss healthy back.  He cannot make it here now for healthy back, and has done well with traditional therapy in the past.  We will try traditional PT currently  4.  RTC 4 months    A consultation note will be sent to Dr. Ortiz through epic.  Thank you for the consult    Follow-up: Follow up in about 4 months (around 9/17/2019). If there are any questions prior to this, the patient was instructed to contact the office.

## 2019-05-16 ENCOUNTER — OFFICE VISIT (OUTPATIENT)
Dept: SPORTS MEDICINE | Facility: CLINIC | Age: 64
End: 2019-05-16
Payer: COMMERCIAL

## 2019-05-16 VITALS
DIASTOLIC BLOOD PRESSURE: 81 MMHG | HEIGHT: 74 IN | WEIGHT: 226 LBS | BODY MASS INDEX: 29 KG/M2 | HEART RATE: 62 BPM | SYSTOLIC BLOOD PRESSURE: 143 MMHG

## 2019-05-16 DIAGNOSIS — M19.019 DJD OF AC (ACROMIOCLAVICULAR) JOINT: ICD-10-CM

## 2019-05-16 DIAGNOSIS — M75.100 ROTATOR CUFF TEAR: Primary | ICD-10-CM

## 2019-05-16 DIAGNOSIS — M25.512 CHRONIC LEFT SHOULDER PAIN: ICD-10-CM

## 2019-05-16 DIAGNOSIS — G89.29 CHRONIC LEFT SHOULDER PAIN: ICD-10-CM

## 2019-05-16 PROCEDURE — 99999 PR PBB SHADOW E&M-EST. PATIENT-LVL III: CPT | Mod: PBBFAC,,, | Performed by: ORTHOPAEDIC SURGERY

## 2019-05-16 PROCEDURE — 3079F PR MOST RECENT DIASTOLIC BLOOD PRESSURE 80-89 MM HG: ICD-10-PCS | Mod: CPTII,S$GLB,, | Performed by: ORTHOPAEDIC SURGERY

## 2019-05-16 PROCEDURE — 3008F PR BODY MASS INDEX (BMI) DOCUMENTED: ICD-10-PCS | Mod: CPTII,S$GLB,, | Performed by: ORTHOPAEDIC SURGERY

## 2019-05-16 PROCEDURE — 99999 PR PBB SHADOW E&M-EST. PATIENT-LVL III: ICD-10-PCS | Mod: PBBFAC,,, | Performed by: ORTHOPAEDIC SURGERY

## 2019-05-16 PROCEDURE — 99214 PR OFFICE/OUTPT VISIT, EST, LEVL IV, 30-39 MIN: ICD-10-PCS | Mod: S$GLB,,, | Performed by: ORTHOPAEDIC SURGERY

## 2019-05-16 PROCEDURE — 99214 OFFICE O/P EST MOD 30 MIN: CPT | Mod: S$GLB,,, | Performed by: ORTHOPAEDIC SURGERY

## 2019-05-16 PROCEDURE — 3077F SYST BP >= 140 MM HG: CPT | Mod: CPTII,S$GLB,, | Performed by: ORTHOPAEDIC SURGERY

## 2019-05-16 PROCEDURE — 3077F PR MOST RECENT SYSTOLIC BLOOD PRESSURE >= 140 MM HG: ICD-10-PCS | Mod: CPTII,S$GLB,, | Performed by: ORTHOPAEDIC SURGERY

## 2019-05-16 PROCEDURE — 3008F BODY MASS INDEX DOCD: CPT | Mod: CPTII,S$GLB,, | Performed by: ORTHOPAEDIC SURGERY

## 2019-05-16 PROCEDURE — 3079F DIAST BP 80-89 MM HG: CPT | Mod: CPTII,S$GLB,, | Performed by: ORTHOPAEDIC SURGERY

## 2019-05-16 NOTE — PROGRESS NOTES
CC: left Shoulder pain    64 y.o. Male reports that the pain is severe and not responding to any conservative care.    No traumatic event. Has hx of right RTC repair with great results  He reports that the pain is worse with overhead activity. It also bothers him at night.  Has not had PT or injections       PAST MEDICAL HISTORY:   Past Medical History:   Diagnosis Date    Degenerative disc disease     Essential hypertension 4/3/2018    Hepatitis     Hyperlipidemia      PAST SURGICAL HISTORY:   Past Surgical History:   Procedure Laterality Date    SHOULDER SURGERY  4/25/12    rt shoulder     FAMILY HISTORY:   Family History   Problem Relation Age of Onset    Cancer Mother         liver    Heart disease Father         cabg    Diabetes Daughter     Hyperlipidemia Sister     Hyperlipidemia Sister      SOCIAL HISTORY:   Social History     Socioeconomic History    Marital status:      Spouse name: Not on file    Number of children: 2    Years of education: Not on file    Highest education level: Not on file   Occupational History     Employer: [employer]   Social Needs    Financial resource strain: Not on file    Food insecurity:     Worry: Not on file     Inability: Not on file    Transportation needs:     Medical: Not on file     Non-medical: Not on file   Tobacco Use    Smoking status: Never Smoker    Smokeless tobacco: Never Used   Substance and Sexual Activity    Alcohol use: No    Drug use: Not on file    Sexual activity: Not on file   Lifestyle    Physical activity:     Days per week: Not on file     Minutes per session: Not on file    Stress: Not on file   Relationships    Social connections:     Talks on phone: Not on file     Gets together: Not on file     Attends Mandaen service: Not on file     Active member of club or organization: Not on file     Attends meetings of clubs or organizations: Not on file     Relationship status: Not on file   Other Topics Concern    Not  on file   Social History Narrative    Not on file       MEDICATIONS: No current outpatient medications on file.  ALLERGIES:   Review of patient's allergies indicates:   Allergen Reactions    No known allergies        VITAL SIGNS: There were no vitals taken for this visit.     Review of Systems   Constitution: Negative. Negative for chills, fever and night sweats.   HENT: Negative for congestion and headaches.    Eyes: Negative for blurred vision, left vision loss and right vision loss.   Cardiovascular: Negative for chest pain and syncope.   Respiratory: Negative for cough and shortness of breath.    Endocrine: Negative for polydipsia, polyphagia and polyuria.   Hematologic/Lymphatic: Negative for bleeding problem. Does not bruise/bleed easily.   Skin: Negative for dry skin, itching and rash.   Musculoskeletal: Negative for falls and muscle weakness.   Gastrointestinal: Negative for abdominal pain and bowel incontinence.   Genitourinary: Negative for bladder incontinence and nocturia.   Neurological: Negative for disturbances in coordination, loss of balance and seizures.   Psychiatric/Behavioral: Negative for depression. The patient does not have insomnia.    Allergic/Immunologic: Negative for hives and persistent infections.       PHYSICAL EXAMINATION:  General:  The patient is alert and oriented x 3.  Mood is pleasant.  Observation of ears, eyes and nose reveal no gross abnormalities.  HEENT: NCAT, sclera nonicteric  Lungs: Respirations are equal and unlabored.  Gait is coordinated. Patient can toe walk and heel walk without difficulty.  Cardiovascular: There are no swelling or varicosities present.   Lymphatic: Negative for adenopathy       left Shoulder / Upper Extremity Exam    OBSERVATION:     Swelling  none  Deformity  none   Discoloration  none   Scapular winging none   Scars   none  Atrophy  none    TENDERNESS / CREPITUS (T/C):          T/C      T/C   Clavicle   -/-  SUPRAspinatus    +/-   AC Jt.     -/-  INFRAspinatus  +/-   SC Jt.    -/-  Deltoid    -/-   G. Tuberosity  -/-  LH BICEP groove  -/-   Acromion:  -/-  Midline Neck   -/-   Scapular Spine -/-  Trapezium   -/-   SMA Scapula  -/-  GH jt. line - post  -/-   Scapulothoracic  -/-         ROM: (* = with pain)  Right shoulder   Left shoulder        AROM (PROM)   AROM (PROM)   FE    170° (175°)     170° (175°)  *   ER at 0°    60°  (65°)    60°  (65°)   ER at 90° ABD  90°  (90°)    90°  (90°)*   IR at 90°  ABD   NA  (40°)     NA  (40°)      IR (spine level)   T10     T10    STRENGTH: (* = with pain) RIGHT SHOULDER  LEFT SHOULDER   SCAPTION at 0   5/5    5/5*    SCAPTION at 30   5/5    5/5*    IR    5/5    5/5   ER    5/5    5/5   BICEPS   5/5    5/5   Deltoid    5/5    5/5     SIGNS:  Painful side       NEER   neg    OOSWALDS  Neg   MILTON   neg    SPEEDS  Neg   DROP ARM   neg   BELLY PRESS Neg   Superior escape none    LIFT-OFF  Neg   X-Body ADD    neg    MOVING VALGUS Neg      STABILITY TESTING    RIGHT SHOULDER   LEFT SHOULDER       Translation    Anterior  up face     up face    Posterior  up face    up face    Sulcus   < 10mm    < 10 mm    Signs    Apprehension   neg      Neg    Relocation   no change     no change    Jerk test  neg     Neg      EXTREMITY NEURO-VASCULAR EXAM    Sensation grossly intact to light touch all dermatomal regions.    DTR 2+ Biceps, Triceps, BR and Negative Khalifs sign   Grossly intact motor function at Elbow, Wrist and Hand   Distal pulses radial and ulnar 2+, brisk cap refill, symmetric.      NECK:  Painless FROM and spinous processes non-tender. Negative Spurlings sign.      OTHER FINDINGS:    XRAYS (5/9/19):  Shoulder trauma series left ,  were ordered and reviewed by me. No convincing fracture or dislocation is noted. The osseous structures appear well mineralized and well aligned    MRI (5/15/19):  1.  Full-thickness tear involving the anterior leading edge of the supraspinatus tendon 14 x 14 mm in size.   There is evidence of elevation the humeral head within the joint compartment with suggest a chronic component warranting further verification based the patient's clinical findings.    2.  Prominent eburnation changes identified about the acromioclavicular joint manifesting in narrowing the supraspinatus outlet region.    3.  Undersurface tear involving the insertion point of the subscapularis tendon margin.      ASSESSMENT:  Left shoulder pain, RC tear    PLAN:  Treatment options were discussed with the patient about shoulder.  I reviewed the MRI images with his and what this means for his shoulder.    We discussed both non-operative and operative options for his shoulder and the risks and benefits of each. Time was given for questions to be asked and all concerns were answered.    He would like to go forward with surgery for his shoulder which I think is reasonable.  All specific risks and benefits were reviewed.    These risks include but are not limited to: bleeding, infection, scarring, re-tear of repair, irreparability of the tear, damage to neurovascular structures, damage to cartilage, stiffness, blood clots, pulmonary embolism, swelling, compartment syndrome, need for further surgery, and the risks of anesthesia.      He verbalized her understanding of these risks and wished to proceed with surgery.    Time was spent face-to-face with the patient during this encounter on counseling about treatment options including surgery and coordination of his care for preoperative visits, surgery and post-operative rehab.     The operative plan will be:    left   1. Arthroscopic rotator cuff repair with [employer] implant  2. Arthroscopic subacromial decompression  3. Arthroscopic distal clavicle excision  4. Possible open biceps subpectoral tenodesis    Patient will  need medical clearance prior to the pre-operative appointment.    All questions were answered, patient will contact us for questions or concerns  in the interim.

## 2019-05-17 ENCOUNTER — OFFICE VISIT (OUTPATIENT)
Dept: SPINE | Facility: CLINIC | Age: 64
End: 2019-05-17
Attending: PHYSICAL MEDICINE & REHABILITATION
Payer: COMMERCIAL

## 2019-05-17 VITALS
DIASTOLIC BLOOD PRESSURE: 75 MMHG | TEMPERATURE: 99 F | SYSTOLIC BLOOD PRESSURE: 139 MMHG | BODY MASS INDEX: 29.14 KG/M2 | HEIGHT: 74 IN | HEART RATE: 62 BPM | WEIGHT: 227.06 LBS

## 2019-05-17 DIAGNOSIS — G89.29 CHRONIC BILATERAL LOW BACK PAIN WITHOUT SCIATICA: ICD-10-CM

## 2019-05-17 DIAGNOSIS — M43.06 SPONDYLOLYSIS OF LUMBAR REGION: Primary | ICD-10-CM

## 2019-05-17 DIAGNOSIS — M51.36 DDD (DEGENERATIVE DISC DISEASE), LUMBAR: ICD-10-CM

## 2019-05-17 DIAGNOSIS — M54.50 CHRONIC BILATERAL LOW BACK PAIN WITHOUT SCIATICA: ICD-10-CM

## 2019-05-17 PROCEDURE — 99244 PR OFFICE CONSULTATION,LEVEL IV: ICD-10-PCS | Mod: S$GLB,,, | Performed by: PHYSICAL MEDICINE & REHABILITATION

## 2019-05-17 PROCEDURE — 99244 OFF/OP CNSLTJ NEW/EST MOD 40: CPT | Mod: S$GLB,,, | Performed by: PHYSICAL MEDICINE & REHABILITATION

## 2019-05-17 PROCEDURE — 99999 PR PBB SHADOW E&M-EST. PATIENT-LVL IV: CPT | Mod: PBBFAC,,, | Performed by: PHYSICAL MEDICINE & REHABILITATION

## 2019-05-17 PROCEDURE — 99999 PR PBB SHADOW E&M-EST. PATIENT-LVL IV: ICD-10-PCS | Mod: PBBFAC,,, | Performed by: PHYSICAL MEDICINE & REHABILITATION

## 2019-05-17 NOTE — LETTER
May 17, 2019      Jesus Manuel Ortiz MD  [address]  [facility] [address]           [address]  [address], [address]  [facility] [address]  Phone: [phone]  Fax: [phone]          Patient: Leonardo Pisano   MR Number: [id number]   YOB: 1955   Date of Visit: 5/17/2019       Dear Dr. Jesus Manuel Ortiz:    Thank you for referring Leonardo Pisano to me for evaluation. Attached you will find relevant portions of my assessment and plan of care.    If you have questions, please do not hesitate to call me. I look forward to following Leonardo Pisano along with you.    Sincerely,    Toya Rahman MD    Enclosure  CC:  No Recipients    If you would like to receive this communication electronically, please contact [email or web address] or [phone] to request more information on [employer] access.    For providers and/or their staff who would like to refer a patient to [facility], please contact us through our one-stop-shop provider referral line, [facility], at [phone].    If you feel you have received this communication in error or would no longer like to receive these types of communications, please e-mail [email or web address]

## 2019-06-05 ENCOUNTER — PATIENT MESSAGE (OUTPATIENT)
Dept: ADMINISTRATIVE | Facility: OTHER | Age: 64
End: 2019-06-05

## 2019-06-10 ENCOUNTER — PATIENT OUTREACH (OUTPATIENT)
Dept: OTHER | Facility: OTHER | Age: 64
End: 2019-06-10

## 2019-06-10 NOTE — PROGRESS NOTES
"Last 5 Patient Entered Readings                                      Current 30 Day Average: 127/74     Recent Readings 6/8/2019 6/6/2019 6/6/2019 6/5/2019 6/3/2019    SBP (mmHg) 128 120 138 130 127    DBP (mmHg) 73 70 72 75 80    Pulse 54 54 57 55 57        Digital Medicine: Health  Follow Up    Encounter was brief.  BP is well controlled.  Patient reports everything is going well..    Lifestyle Modifications:    1.Dietary Modifications (Sodium intake <2,000mg/day, food labels, dining out):   Patient reports continuing to pay close attention to his food and limit his salt intake.  Gave encouragement to patient.     2.Physical Activity:   Patient reports continuing to exercise "as much as he can".  Encouraged patient to keep up the great work.    3.Medication Therapy:   None    4.Patient has the following medication side effects/concerns: None  (Frequency/Alleviating factors/Precipitating factors, etc.)     Follow up with  Leonardo HATFIELD Zohrehsylvester completed. No further questions or concerns. Will continue to follow up to achieve health goals.  Patient is currently at goal, 127/74 mmHg does not exceed <130/80 mmHg.      "

## 2019-08-05 ENCOUNTER — PATIENT OUTREACH (OUTPATIENT)
Dept: OTHER | Facility: OTHER | Age: 64
End: 2019-08-05

## 2019-08-05 NOTE — PROGRESS NOTES
"Last 5 Patient Entered Readings                                      Current 30 Day Average: 130/74     Recent Readings 8/3/2019 8/3/2019 7/31/2019 7/29/2019 7/28/2019    SBP (mmHg) 122 141 127 115 142    DBP (mmHg) 73 76 74 69 78    Pulse 54 57 62 62 55        Digital Medicine: Health  Follow Up    Patient reports he went on a mission trip to [place] and "had to eat what they had".  Patient reports he had a few elevated readings and attributes them to the food choices.  Patient reports everything is going well.  BP is controlled.    Lifestyle Modifications:    1.Dietary Modifications (Sodium intake <2,000mg/day, food labels, dining out):   Patient continues to limit salt in his diet.  Patient denies needing any other help with nutrition at this time.    2.Physical Activity:   Patient reports no changes and denies needing any other help with activity at this time.    3.Medication Therapy:   Patient has been compliant with the medication regimen.    4.Patient has the following medication side effects/concerns: None  (Frequency/Alleviating factors/Precipitating factors, etc.)     Follow up with Mr. Leonardo Pisano completed. No further questions or concerns. Will continue to follow up to achieve health goals.  Patient is currently at goal, 130/74 mmHg does not exceed <130/80 mmHg.      "

## 2019-08-28 ENCOUNTER — PATIENT OUTREACH (OUTPATIENT)
Dept: OTHER | Facility: OTHER | Age: 64
End: 2019-08-28

## 2019-08-28 NOTE — PROGRESS NOTES
Last 5 Patient Entered Readings                                      Current 30 Day Average: 132/77     Recent Readings 8/27/2019 8/26/2019 8/23/2019 8/22/2019 8/21/2019    SBP (mmHg) 131 124 137 137 145    DBP (mmHg) 80 81 81 76 69    Pulse 62 65 59 60 67        Called patient for BP follow up. No answer. Left message for call back        Melania Reza, Pharm.D.   [facility] Clinical Pharmacist  [phone]

## 2019-09-04 ENCOUNTER — PATIENT OUTREACH (OUTPATIENT)
Dept: ADMINISTRATIVE | Facility: OTHER | Age: 64
End: 2019-09-04

## 2019-09-09 PROBLEM — E78.49 OTHER HYPERLIPIDEMIA: Status: ACTIVE | Noted: 2018-10-26

## 2019-09-25 NOTE — PROGRESS NOTES
Digital Medicine: Clinician Follow-Up    Called patient for BP follow up. Patient is doing well with no complaints. He is not taking BP medications. He prefers to manage BP with lifestyle changes. He says that recently he has been dining out often and contributes this to high BP readings. He says he intends to get back on track with monitoring sodium    The history is provided by the patient.     Follow Up  Follow-up reason(s): reading review and routine education      Readings are trending up due to lifestyle change.    Routine Education Topics: eating patterns and physical activity            Sleep Apnea  Patient not previously diagnosed with DANIELA and     Medication Affordability  Patient is currently not having problems affording medications      INTERVENTION(S)  recommended diet modifications and recommended physical activity    PLAN  patient verbalizes understanding and patient amenable to changes    30-day BP average <130/<80 mmHg. Continue lifestyle changes.       There are no preventive care reminders to display for this patient.    Last 5 Patient Entered Readings                                      Current 30 Day Average: 135/79     Recent Readings 9/23/2019 9/22/2019 9/19/2019 9/19/2019 9/15/2019    SBP (mmHg) 122 133 136 151 130    DBP (mmHg) 70 75 73 76 75    Pulse 60 60 60 57 56

## 2019-09-30 ENCOUNTER — PATIENT OUTREACH (OUTPATIENT)
Dept: OTHER | Facility: OTHER | Age: 64
End: 2019-09-30

## 2019-09-30 NOTE — PROGRESS NOTES
"Digital Medicine: Health  Follow-Up    Patient denies any other concerns at this time.         Follow Up  Follow-up reason(s): reading review      Readings are trending up due to lifestyle change.  Patient is aware that his readings are a little elevated recently.  Patient attributes elevation to poor diet and being a little more stressed with work lately.  Patient states he manages his stress by going hunting and fishing.         Diet:   Patient reports eating or drinking the following: restaurant food    Patient states he has been eating "a lot" and has been eating whatever he wants too lately".  Patient states he is trying to get back on track.       INTERVENTION(S)  recommended diet modifications and encouragement/support      There are no preventive care reminders to display for this patient.    Last 5 Patient Entered Readings                                      Current 30 Day Average: 137/79     Recent Readings 9/27/2019 9/26/2019 9/23/2019 9/22/2019 9/19/2019    SBP (mmHg) 138 131 122 133 136    DBP (mmHg) 79 73 70 75 73    Pulse 58 58 60 60 60                "

## 2019-12-02 ENCOUNTER — PATIENT OUTREACH (OUTPATIENT)
Dept: OTHER | Facility: OTHER | Age: 64
End: 2019-12-02

## 2019-12-02 NOTE — PROGRESS NOTES
"Digital Medicine: Health  Follow-Up    Patient denies any other concerns at this time.           Follow Up  Follow-up reason(s): reading review          INTERVENTION(S)  recommended diet modifications, recommend physical activity and encouragement/support    PLAN  continue monitoring      There are no preventive care reminders to display for this patient.    Last 5 Patient Entered Readings                                      Current 30 Day Average: 137/75     Recent Readings 12/2/2019 12/1/2019 11/24/2019 11/23/2019 11/22/2019    SBP (mmHg) 141 139 134 134 136    DBP (mmHg) 73 77 68 75 69    Pulse 55 55 59 60 57            Diet Screening       Patient reports he "enjoyed himself" over the holidays "a little too much".  Encouraged patient to get back into his routine.     Physical Activity Screening   When asked if exercising, patient responded: no    Patient reports he has not been as active due to the holiday season.  Encouraged patient to get back into his routine as "the holidays" are just a couple of days at a time.  Patient was grateful for the encouragement.     " no

## 2020-02-17 ENCOUNTER — PATIENT OUTREACH (OUTPATIENT)
Dept: OTHER | Facility: OTHER | Age: 65
End: 2020-02-17

## 2020-02-17 NOTE — PROGRESS NOTES
"Digital Medicine: Health  Follow-Up    Patient denies any other concerns at this time.           Follow Up  Follow-up reason(s): reading review      Readings are trending up due to lifestyle change.        INTERVENTION(S)  recommended diet modifications, recommend physical activity and encouragement/support      There are no preventive care reminders to display for this patient.    Last 5 Patient Entered Readings                                      Current 30 Day Average: 135/74     Recent Readings 2/16/2020 2/10/2020 2/10/2020 2/9/2020 2/7/2020    SBP (mmHg) 140 138 152 138 143    DBP (mmHg) 70 78 81 78 74    Pulse 62 63 62 65 63            Diet Screening       Patient states "he has been having too much brittany cake".  Reminded patient that he could enjoy brittany cake, but be sure to monitor how much of it he was eating.     Physical Activity Screening   When asked if exercising, patient responded: no    Patient reports his exercise has fallen off.  Patient knows he needs to get back into his routine.     "

## 2020-03-11 ENCOUNTER — PATIENT OUTREACH (OUTPATIENT)
Dept: OTHER | Facility: OTHER | Age: 65
End: 2020-03-11

## 2020-03-11 NOTE — PROGRESS NOTES
Called patient for BP follow up. No answer. Left message for call back    Patient's 30-day BP average is 139/77 mmHg and has increased since last encounter. Goal <130/<80 mmHg.     Will discuss initiating HCTZ 12.5 mg

## 2020-03-25 NOTE — PROGRESS NOTES
Called patient for BP follow up. No answer. Voicemail box full.     Patient's 30-day BP average is 136/75 mmHg. Goal <130/<80 mmHg.     Will discuss initiating HCTZ 12.5 mg at future encounter.

## 2020-04-13 ENCOUNTER — PATIENT OUTREACH (OUTPATIENT)
Dept: OTHER | Facility: OTHER | Age: 65
End: 2020-04-13

## 2020-04-13 NOTE — PROGRESS NOTES
"Digital Medicine: Health  Follow-Up    Patient denies any other concerns at this time.           Follow Up  Follow-up reason(s): reading review      Readings are trending up   Patient states "he knows his BP a little elevated" but states it is "probably more stress with covid-19 situation than anything".  Patient reports he is back to exercising as well.       INTERVENTION(S)  recommend physical activity and encouragement/support    PLAN  continue monitoring      There are no preventive care reminders to display for this patient.    Last 5 Patient Entered Readings                                      Current 30 Day Average: 134/74     Recent Readings 4/12/2020 4/8/2020 4/4/2020 4/1/2020 3/29/2020    SBP (mmHg) 136 133 138 123 129    DBP (mmHg) 77 76 78 70 69    Pulse 59 66 65 61 61            Diet Screening   No change to diet.      Physical Activity Screening   When asked if exercising, patient responded: yes    He exercises for 30 minutes per day 7 day(s) a week.      Patient participates in the following activities: walking    Patient reports he is back to walking in the evenings     "

## 2020-04-22 NOTE — PROGRESS NOTES
Digital Medicine: Clinician Follow-Up    Called patient for BP follow up. He is doing well today with no complaints. Patient says that he has not been eating like he should and has slacked off with exercise. He says he is opposed to taking medication for BP until he feels there is no more that he can with lifestyle.     The history is provided by the patient.     Follow Up  Follow-up reason(s): reading review and routine education      Routine Education Topics: eating patterns and physical activity  Patient's 30-day BP average is above goal of <130/<80 mmHg.       INTERVENTION(S)  recommended diet modifications, recommended physical activity and recommended med change - patient refuses    PLAN  patient verbalizes understanding and continue monitoring    Continue lifestyle changes.     Recommended a low dose medication to help reach goal of <130/<80 mmHg. Patient prefers to manage BP with lifestyle changes. We agreed that if BP average reaches >140/> 80 mmHg, we would discuss initiation of therapy      There are no preventive care reminders to display for this patient.    Last 5 Patient Entered Readings                                      Current 30 Day Average: 134/75     Recent Readings 4/20/2020 4/20/2020 4/15/2020 4/12/2020 4/8/2020    SBP (mmHg) 147 160 133 136 133    DBP (mmHg) 81 76 74 77 76    Pulse 62 62 58 59 66                        Screenings

## 2020-08-26 ENCOUNTER — PATIENT OUTREACH (OUTPATIENT)
Dept: OTHER | Facility: OTHER | Age: 65
End: 2020-08-26

## 2020-08-26 NOTE — PROGRESS NOTES
Digital Medicine: Health  Follow-Up    The history is provided by the patient.             Reason for review: Blood pressure at goal        Topics Covered on Call: physical activity and Diet    Additional Follow-up details: Called patient to introduce myself as new health . The patient stated that he is trying to control his BP readings by way of exercising and watching what he eats. The patient's BP readings are very well controlled and close to goal. Commended and encouraged patient.         Diet-no change to diet    No change to diet.  Patient reports eating or drinking the following: Patient monitors his sodium intake and drinks close to one gallon of water per day.       Physical Activity-no change to routine  No change to exercise routine.       Additional physical activity details: Patient works outdoors, so he is usually pretty active, but he also exercises throughout the week.      Medication Adherence-Medication adherence was assessed.          Continue current diet/physical activity routine.       Addressed any questions or concerns and patient has my contact information if needed prior to next outreach. Patient verbalizes understanding.      Explained the importance of self-monitoring and medication adherence. Encouraged the patient to communicate with their health  for lifestyle modifications to help improve or maintain a healthy lifestyle.            There are no preventive care reminders to display for this patient.    Last 5 Patient Entered Readings                                      Current 30 Day Average: 133/76     Recent Readings 8/26/2020 8/18/2020 8/18/2020 8/9/2020 8/5/2020    SBP (mmHg) 133 131 139 129 132    DBP (mmHg) 71 72 77 73 75    Pulse 61 56 61 57 57

## 2020-09-22 ENCOUNTER — PATIENT MESSAGE (OUTPATIENT)
Dept: INTERNAL MEDICINE | Facility: CLINIC | Age: 65
End: 2020-09-22

## 2020-09-22 DIAGNOSIS — M47.816 LUMBAR SPONDYLOSIS: ICD-10-CM

## 2020-09-22 DIAGNOSIS — Z00.00 ANNUAL PHYSICAL EXAM: Primary | ICD-10-CM

## 2020-09-22 DIAGNOSIS — I10 ESSENTIAL HYPERTENSION: ICD-10-CM

## 2020-09-22 DIAGNOSIS — E78.5 HYPERLIPIDEMIA, UNSPECIFIED HYPERLIPIDEMIA TYPE: ICD-10-CM

## 2020-09-25 ENCOUNTER — OFFICE VISIT (OUTPATIENT)
Dept: INTERNAL MEDICINE | Facility: CLINIC | Age: 65
End: 2020-09-25
Payer: COMMERCIAL

## 2020-09-25 ENCOUNTER — LAB VISIT (OUTPATIENT)
Dept: LAB | Facility: HOSPITAL | Age: 65
End: 2020-09-25
Attending: INTERNAL MEDICINE
Payer: COMMERCIAL

## 2020-09-25 VITALS
WEIGHT: 235 LBS | HEART RATE: 60 BPM | BODY MASS INDEX: 30.16 KG/M2 | OXYGEN SATURATION: 98 % | DIASTOLIC BLOOD PRESSURE: 80 MMHG | SYSTOLIC BLOOD PRESSURE: 142 MMHG | HEIGHT: 74 IN

## 2020-09-25 DIAGNOSIS — Z00.00 ANNUAL PHYSICAL EXAM: Primary | ICD-10-CM

## 2020-09-25 DIAGNOSIS — M47.816 LUMBAR SPONDYLOSIS: ICD-10-CM

## 2020-09-25 DIAGNOSIS — E78.5 HYPERLIPIDEMIA, UNSPECIFIED HYPERLIPIDEMIA TYPE: ICD-10-CM

## 2020-09-25 DIAGNOSIS — Z00.00 ANNUAL PHYSICAL EXAM: ICD-10-CM

## 2020-09-25 DIAGNOSIS — I10 ESSENTIAL HYPERTENSION: ICD-10-CM

## 2020-09-25 DIAGNOSIS — Z12.11 SCREEN FOR COLON CANCER: ICD-10-CM

## 2020-09-25 DIAGNOSIS — M54.2 NECK PAIN: ICD-10-CM

## 2020-09-25 LAB
ALBUMIN SERPL BCP-MCNC: 4.2 G/DL (ref 3.5–5.2)
ALP SERPL-CCNC: 96 U/L (ref 55–135)
ALT SERPL W/O P-5'-P-CCNC: 45 U/L (ref 10–44)
ANION GAP SERPL CALC-SCNC: 7 MMOL/L (ref 8–16)
AST SERPL-CCNC: 23 U/L (ref 10–40)
BASOPHILS # BLD AUTO: 0.05 K/UL (ref 0–0.2)
BASOPHILS NFR BLD: 0.7 % (ref 0–1.9)
BILIRUB SERPL-MCNC: 0.6 MG/DL (ref 0.1–1)
BUN SERPL-MCNC: 24 MG/DL (ref 8–23)
CALCIUM SERPL-MCNC: 9.4 MG/DL (ref 8.7–10.5)
CHLORIDE SERPL-SCNC: 104 MMOL/L (ref 95–110)
CHOLEST SERPL-MCNC: 214 MG/DL (ref 120–199)
CHOLEST/HDLC SERPL: 3.9 {RATIO} (ref 2–5)
CO2 SERPL-SCNC: 29 MMOL/L (ref 23–29)
COMPLEXED PSA SERPL-MCNC: 0.57 NG/ML (ref 0–4)
CREAT SERPL-MCNC: 0.9 MG/DL (ref 0.5–1.4)
DIFFERENTIAL METHOD: NORMAL
EOSINOPHIL # BLD AUTO: 0.3 K/UL (ref 0–0.5)
EOSINOPHIL NFR BLD: 4 % (ref 0–8)
ERYTHROCYTE [DISTWIDTH] IN BLOOD BY AUTOMATED COUNT: 13 % (ref 11.5–14.5)
EST. GFR  (AFRICAN AMERICAN): >60 ML/MIN/1.73 M^2
EST. GFR  (NON AFRICAN AMERICAN): >60 ML/MIN/1.73 M^2
GLUCOSE SERPL-MCNC: 95 MG/DL (ref 70–110)
HCT VFR BLD AUTO: 46 % (ref 40–54)
HDLC SERPL-MCNC: 55 MG/DL (ref 40–75)
HDLC SERPL: 25.7 % (ref 20–50)
HGB BLD-MCNC: 14.7 G/DL (ref 14–18)
IMM GRANULOCYTES # BLD AUTO: 0.01 K/UL (ref 0–0.04)
IMM GRANULOCYTES NFR BLD AUTO: 0.1 % (ref 0–0.5)
LDLC SERPL CALC-MCNC: 130.6 MG/DL (ref 63–159)
LYMPHOCYTES # BLD AUTO: 1.4 K/UL (ref 1–4.8)
LYMPHOCYTES NFR BLD: 20.9 % (ref 18–48)
MCH RBC QN AUTO: 30.8 PG (ref 27–31)
MCHC RBC AUTO-ENTMCNC: 32 G/DL (ref 32–36)
MCV RBC AUTO: 96 FL (ref 82–98)
MONOCYTES # BLD AUTO: 0.5 K/UL (ref 0.3–1)
MONOCYTES NFR BLD: 7.5 % (ref 4–15)
NEUTROPHILS # BLD AUTO: 4.5 K/UL (ref 1.8–7.7)
NEUTROPHILS NFR BLD: 66.8 % (ref 38–73)
NONHDLC SERPL-MCNC: 159 MG/DL
NRBC BLD-RTO: 0 /100 WBC
PLATELET # BLD AUTO: 255 K/UL (ref 150–350)
PMV BLD AUTO: 10 FL (ref 9.2–12.9)
POTASSIUM SERPL-SCNC: 4.6 MMOL/L (ref 3.5–5.1)
PROT SERPL-MCNC: 7.2 G/DL (ref 6–8.4)
RBC # BLD AUTO: 4.78 M/UL (ref 4.6–6.2)
SODIUM SERPL-SCNC: 140 MMOL/L (ref 136–145)
TRIGL SERPL-MCNC: 142 MG/DL (ref 30–150)
TSH SERPL DL<=0.005 MIU/L-ACNC: 1.74 UIU/ML (ref 0.4–4)
WBC # BLD AUTO: 6.7 K/UL (ref 3.9–12.7)

## 2020-09-25 PROCEDURE — 90670 PNEUMOCOCCAL CONJUGATE VACCINE 13-VALENT LESS THAN 5YO & GREATER THAN: ICD-10-PCS | Mod: S$GLB,,, | Performed by: INTERNAL MEDICINE

## 2020-09-25 PROCEDURE — 90472 IMMUNIZATION ADMIN EACH ADD: CPT | Mod: S$GLB,,, | Performed by: INTERNAL MEDICINE

## 2020-09-25 PROCEDURE — 90471 PNEUMOCOCCAL CONJUGATE VACCINE 13-VALENT LESS THAN 5YO & GREATER THAN: ICD-10-PCS | Mod: S$GLB,,, | Performed by: INTERNAL MEDICINE

## 2020-09-25 PROCEDURE — 84443 ASSAY THYROID STIM HORMONE: CPT

## 2020-09-25 PROCEDURE — 90670 PCV13 VACCINE IM: CPT | Mod: S$GLB,,, | Performed by: INTERNAL MEDICINE

## 2020-09-25 PROCEDURE — 99397 PER PM REEVAL EST PAT 65+ YR: CPT | Mod: 25,S$GLB,ICN, | Performed by: INTERNAL MEDICINE

## 2020-09-25 PROCEDURE — 99999 PR PBB SHADOW E&M-EST. PATIENT-LVL III: CPT | Mod: PBBFAC,,, | Performed by: INTERNAL MEDICINE

## 2020-09-25 PROCEDURE — 99397 PR PREVENTIVE VISIT,EST,65 & OVER: ICD-10-PCS | Mod: 25,S$GLB,ICN, | Performed by: INTERNAL MEDICINE

## 2020-09-25 PROCEDURE — 85025 COMPLETE CBC W/AUTO DIFF WBC: CPT

## 2020-09-25 PROCEDURE — 3079F DIAST BP 80-89 MM HG: CPT | Mod: CPTII,S$GLB,, | Performed by: INTERNAL MEDICINE

## 2020-09-25 PROCEDURE — 99999 PR PBB SHADOW E&M-EST. PATIENT-LVL III: ICD-10-PCS | Mod: PBBFAC,,, | Performed by: INTERNAL MEDICINE

## 2020-09-25 PROCEDURE — 3077F PR MOST RECENT SYSTOLIC BLOOD PRESSURE >= 140 MM HG: ICD-10-PCS | Mod: CPTII,S$GLB,, | Performed by: INTERNAL MEDICINE

## 2020-09-25 PROCEDURE — 36415 COLL VENOUS BLD VENIPUNCTURE: CPT

## 2020-09-25 PROCEDURE — 3077F SYST BP >= 140 MM HG: CPT | Mod: CPTII,S$GLB,, | Performed by: INTERNAL MEDICINE

## 2020-09-25 PROCEDURE — 84153 ASSAY OF PSA TOTAL: CPT

## 2020-09-25 PROCEDURE — 90472 TDAP VACCINE GREATER THAN OR EQUAL TO 7YO IM: ICD-10-PCS | Mod: S$GLB,,, | Performed by: INTERNAL MEDICINE

## 2020-09-25 PROCEDURE — 90715 TDAP VACCINE 7 YRS/> IM: CPT | Mod: S$GLB,,, | Performed by: INTERNAL MEDICINE

## 2020-09-25 PROCEDURE — 3079F PR MOST RECENT DIASTOLIC BLOOD PRESSURE 80-89 MM HG: ICD-10-PCS | Mod: CPTII,S$GLB,, | Performed by: INTERNAL MEDICINE

## 2020-09-25 PROCEDURE — 90715 TDAP VACCINE GREATER THAN OR EQUAL TO 7YO IM: ICD-10-PCS | Mod: S$GLB,,, | Performed by: INTERNAL MEDICINE

## 2020-09-25 PROCEDURE — 90471 IMMUNIZATION ADMIN: CPT | Mod: S$GLB,,, | Performed by: INTERNAL MEDICINE

## 2020-09-25 PROCEDURE — 80061 LIPID PANEL: CPT

## 2020-09-25 PROCEDURE — 80053 COMPREHEN METABOLIC PANEL: CPT

## 2020-09-25 NOTE — PROGRESS NOTES
MEDICAL HISTORY:  Hypertension.  Hyperlipidemia.  Fatty liver.  Lumbar degenerative disc disease.     SOCIAL HISTORY:  Tobacco use, none.  Alcohol use, none.  , has two children.  Works in the oil field business.  Exercises by walking, but it has been limited.     FAMILY HISTORY:  Mother is , pancreatic and liver cancer.  Father is still living, bypass surgery.  Two sisters, both with hyperlipidemia.  One daughter with diabetes.     SCREENING:  Last colonoscopy in .    Answers for HPI/ROS submitted by the patient on 2020   activity change: No  unexpected weight change: No  neck pain: Yes  hearing loss: No  rhinorrhea: No  trouble swallowing: No  eye discharge: No  visual disturbance: No  chest tightness: No  wheezing: No  chest pain: No  palpitations: No  blood in stool: No  constipation: No  vomiting: No  diarrhea: No  polydipsia: No  polyuria: No  difficulty urinating: No  urgency: No  hematuria: No  joint swelling: No  arthralgias: No  headaches: No  weakness: No  dysphoric mood: No      65-year-old male  Annual visit  Overall in general patient has been feeling well  For 2 weeks she has been feeling discomfort at the base of his lower neck that is localize, without any radicular symptoms  No particular incident activity to account for this, other than he was working on a boat      Review of symptoms  Negative for chest pain, palpitations, shortness of breath, abdominal pain, heartburn ingestion, or any other arthralgias  Regular bowel function  No difficulty urinating other than slight diminished urine stream and nocturia x1  Actually has not had much problem with his back    He is being file in the hypertension digital program and systolic readings or in the mid 130s and diastolic readings in the mid 70s    Examination  Weight 235  Pulse 60  Blood pressure 142/80  HEENT exam no abnormal findings  Neck no thyromegaly no masses  Chest clear breath sounds  Heart regular rate and  rhythm  Abdominal exam nontender soft no hepatosplenomegaly abdominal masses  Pulses 2+ carotid pulses 2+ pedal pulses no bruits  Extremities no edema  Lymph gland palpable adenopathy  Rectal stool is brown  prostate mildly enlarged    Impression  General exam  Hypertension  Hyperlipidemia  Cervical pain    Plan  Routine labs  Screening colonoscopy for which he is due  He defers on the flu vaccine, recommend Prevnar 13 in tetanus  Continue to monitor blood pressure  Can take Advil Aleve is needed or can often option of a muscle relaxant

## 2020-09-25 NOTE — PROGRESS NOTES
Administered TDAP to the right deltoid and prevnar 13 to the left deltoid, tolerated well, no c/o pain noted, no adverse reaction noted.

## 2020-09-26 NOTE — PROGRESS NOTES
Overall the lab testing looks fine    The cholesterol is borderline high, but improved compared to previous readings    The total cholesterol is 214 and the LDL/bad cholesterol is 130 which is on the very upper end of normal    Certainly we can continue to try to manage this with proper diet and weight management, although it still would be reasonable to consider low-dose, low intensity statin such as pravastatin 20 mg to consistently keep it within the normal range       Please contact me if there is any questions or concerns regarding the lab testing and recommendations

## 2020-09-30 ENCOUNTER — PATIENT OUTREACH (OUTPATIENT)
Dept: OTHER | Facility: OTHER | Age: 65
End: 2020-09-30

## 2020-09-30 DIAGNOSIS — I10 ESSENTIAL HYPERTENSION: Primary | ICD-10-CM

## 2020-10-02 ENCOUNTER — PATIENT MESSAGE (OUTPATIENT)
Dept: INTERNAL MEDICINE | Facility: CLINIC | Age: 65
End: 2020-10-02

## 2020-10-03 ENCOUNTER — PATIENT MESSAGE (OUTPATIENT)
Dept: SPORTS MEDICINE | Facility: CLINIC | Age: 65
End: 2020-10-03

## 2020-10-12 ENCOUNTER — PATIENT MESSAGE (OUTPATIENT)
Dept: INTERNAL MEDICINE | Facility: CLINIC | Age: 65
End: 2020-10-12

## 2020-10-12 DIAGNOSIS — M54.2 CERVICALGIA: ICD-10-CM

## 2020-10-14 ENCOUNTER — PATIENT MESSAGE (OUTPATIENT)
Dept: INTERNAL MEDICINE | Facility: CLINIC | Age: 65
End: 2020-10-14

## 2020-10-18 ENCOUNTER — PATIENT MESSAGE (OUTPATIENT)
Dept: INTERNAL MEDICINE | Facility: CLINIC | Age: 65
End: 2020-10-18

## 2020-10-20 RX ORDER — HYDROCHLOROTHIAZIDE 12.5 MG/1
12.5 TABLET ORAL DAILY
Qty: 30 TABLET | Refills: 5 | Status: SHIPPED | OUTPATIENT
Start: 2020-10-20 | End: 2021-02-22 | Stop reason: SDUPTHER

## 2020-10-20 NOTE — PROGRESS NOTES
Digital Medicine: Clinician Follow-Up    Called patient for follow up and to discuss initiating HTN therapy.    The history is provided by the patient.      Review of patient's allergies indicates:   -- No known allergies   Follow-up reason(s): routine follow up.     Hypertension    Patient's blood pressure is stable.         Last 5 Patient Entered Readings                                      Current 30 Day Average: 140/78     Recent Readings 10/20/2020 10/20/2020 10/14/2020 10/14/2020 10/9/2020    SBP (mmHg) 139 153 139 143 139    DBP (mmHg) 76 75 79 77 73    Pulse 57 58 60 59 59                 Depression Screening  Did not address depression screening.    Sleep Apnea Screening    Did not address sleep apnea screening.     Medication Affordability Screening  Did not address medication affordability screening.     Medication Adherence-Medication Adherence not addressed.          ASSESSMENT(S)  Patients BP average is 140/78 mmHg, which is above goal. Patient's BP goal is less than or equal to 130/80.     Hypertension Plan  Medication change. Start HCTZ 12.5 mg once daily.   Labs ordered. BMP Expected Lab Date: 11/4/2020  Provided patient education.  Increase water intake     Addressed patient questions and patient has my contact information if needed prior to next outreach. Patient verbalizes understanding.             There are no preventive care reminders to display for this patient.  There are no preventive care reminders to display for this patient.

## 2020-10-23 ENCOUNTER — HOSPITAL ENCOUNTER (OUTPATIENT)
Dept: RADIOLOGY | Facility: HOSPITAL | Age: 65
Discharge: HOME | End: 2020-10-23
Attending: INTERNAL MEDICINE
Payer: COMMERCIAL

## 2020-10-23 DIAGNOSIS — M54.2 CERVICALGIA: ICD-10-CM

## 2020-10-23 PROCEDURE — 72141 MRI NECK SPINE W/O DYE: CPT | Mod: 26,,, | Performed by: RADIOLOGY

## 2020-10-23 PROCEDURE — 72141 MRI CERVICAL SPINE WITHOUT CONTRAST: ICD-10-PCS | Mod: 26,,, | Performed by: RADIOLOGY

## 2020-10-23 PROCEDURE — 72141 MRI NECK SPINE W/O DYE: CPT | Mod: TC

## 2020-10-26 ENCOUNTER — PATIENT MESSAGE (OUTPATIENT)
Dept: INTERNAL MEDICINE | Facility: CLINIC | Age: 65
End: 2020-10-26

## 2020-10-26 DIAGNOSIS — M48.02 CERVICAL SPINAL STENOSIS: Primary | ICD-10-CM

## 2020-10-26 DIAGNOSIS — M48.02 NEURAL FORAMINAL STENOSIS OF CERVICAL SPINE: ICD-10-CM

## 2020-10-26 NOTE — PROGRESS NOTES
Made attempts to reach by phone    The MRI report reveals degenerative changes particularly at C5-6 and C6-7 where there is spinal stenosis, which is a tightening upon the spinal cord from vertebral degenerative changes, and neural foraminal stenosis, which is a tightening upon the openings in which the nerve roots, from this spinal cord through the vertebrae.  This certainly can cause as neck pain as well as neck pain extends down to the shoulders and extremities.    If you still having ongoing pain and discomfort I would recommend put in referral to meet with the Pain center and can also have you meet with neuro surgery for evaluation, although this is not to say that surgery is indicated    Will be contacting you

## 2020-10-29 ENCOUNTER — TELEPHONE (OUTPATIENT)
Dept: NEUROSURGERY | Facility: CLINIC | Age: 65
End: 2020-10-29

## 2020-10-29 NOTE — TELEPHONE ENCOUNTER
----- Message from Shonda Cardona sent at 10/28/2020  1:38 PM CDT -----  Type:  Patient Returning Call    Who Called: pt wife   Who Left Message for Patient: pt   Does the patient know what this is regarding? Pt need an appt his dr Jesus Manuel Ortiz want hime ti see Dr Bynum pt did have a MRI as well   Would the patient rather a call back or a response via [facility]?  Call   Best Call Back [phone]  Additional Information:  soon appt

## 2020-11-04 ENCOUNTER — PATIENT OUTREACH (OUTPATIENT)
Dept: OTHER | Facility: OTHER | Age: 65
End: 2020-11-04

## 2020-11-10 ENCOUNTER — PATIENT OUTREACH (OUTPATIENT)
Dept: ADMINISTRATIVE | Facility: OTHER | Age: 65
End: 2020-11-10

## 2020-11-10 NOTE — PROGRESS NOTES
Care Everywhere: updated  Immunization: updated, links delay   Health Maintenance: updated  Media Review: review for outside colon cancer   Legacy Review: review for 2009 colonoscopy report   Order placed:   Upcoming appts:  Colonoscopy case request 9/25/2020

## 2020-11-11 ENCOUNTER — TELEPHONE (OUTPATIENT)
Dept: NEUROSURGERY | Facility: CLINIC | Age: 65
End: 2020-11-11

## 2020-11-11 ENCOUNTER — OFFICE VISIT (OUTPATIENT)
Dept: NEUROSURGERY | Facility: CLINIC | Age: 65
End: 2020-11-11
Payer: COMMERCIAL

## 2020-11-11 VITALS
DIASTOLIC BLOOD PRESSURE: 79 MMHG | HEART RATE: 63 BPM | WEIGHT: 235.88 LBS | BODY MASS INDEX: 30.29 KG/M2 | SYSTOLIC BLOOD PRESSURE: 138 MMHG

## 2020-11-11 DIAGNOSIS — M54.2 CERVICALGIA: ICD-10-CM

## 2020-11-11 DIAGNOSIS — M48.02 CERVICAL SPINAL STENOSIS: ICD-10-CM

## 2020-11-11 DIAGNOSIS — M48.02 NEURAL FORAMINAL STENOSIS OF CERVICAL SPINE: ICD-10-CM

## 2020-11-11 DIAGNOSIS — M48.02 NEURAL FORAMINAL STENOSIS OF CERVICAL SPINE: Primary | ICD-10-CM

## 2020-11-11 PROCEDURE — 99999 PR PBB SHADOW E&M-EST. PATIENT-LVL III: CPT | Mod: PBBFAC,,, | Performed by: PHYSICIAN ASSISTANT

## 2020-11-11 PROCEDURE — 1101F PR PT FALLS ASSESS DOC 0-1 FALLS W/OUT INJ PAST YR: ICD-10-PCS | Mod: CPTII,S$GLB,, | Performed by: PHYSICIAN ASSISTANT

## 2020-11-11 PROCEDURE — 3008F PR BODY MASS INDEX (BMI) DOCUMENTED: ICD-10-PCS | Mod: CPTII,S$GLB,, | Performed by: PHYSICIAN ASSISTANT

## 2020-11-11 PROCEDURE — 3008F BODY MASS INDEX DOCD: CPT | Mod: CPTII,S$GLB,, | Performed by: PHYSICIAN ASSISTANT

## 2020-11-11 PROCEDURE — 99999 PR PBB SHADOW E&M-EST. PATIENT-LVL III: ICD-10-PCS | Mod: PBBFAC,,, | Performed by: PHYSICIAN ASSISTANT

## 2020-11-11 PROCEDURE — 99203 OFFICE O/P NEW LOW 30 MIN: CPT | Mod: S$GLB,,, | Performed by: PHYSICIAN ASSISTANT

## 2020-11-11 PROCEDURE — 3078F PR MOST RECENT DIASTOLIC BLOOD PRESSURE < 80 MM HG: ICD-10-PCS | Mod: CPTII,S$GLB,, | Performed by: PHYSICIAN ASSISTANT

## 2020-11-11 PROCEDURE — 1101F PT FALLS ASSESS-DOCD LE1/YR: CPT | Mod: CPTII,S$GLB,, | Performed by: PHYSICIAN ASSISTANT

## 2020-11-11 PROCEDURE — 3075F PR MOST RECENT SYSTOLIC BLOOD PRESS GE 130-139MM HG: ICD-10-PCS | Mod: CPTII,S$GLB,, | Performed by: PHYSICIAN ASSISTANT

## 2020-11-11 PROCEDURE — 3075F SYST BP GE 130 - 139MM HG: CPT | Mod: CPTII,S$GLB,, | Performed by: PHYSICIAN ASSISTANT

## 2020-11-11 PROCEDURE — 99203 PR OFFICE/OUTPT VISIT, NEW, LEVL III, 30-44 MIN: ICD-10-PCS | Mod: S$GLB,,, | Performed by: PHYSICIAN ASSISTANT

## 2020-11-11 PROCEDURE — 3078F DIAST BP <80 MM HG: CPT | Mod: CPTII,S$GLB,, | Performed by: PHYSICIAN ASSISTANT

## 2020-11-11 NOTE — LETTER
November 11, 2020      Jesus Manuel Ortiz MD  [address]  [place] [id number]           [facility] 7th Fl  [address]  [facility] [address]  Phone: [phone]  Fax: [phone]          Patient: Leonardo Pisano   MR Number: [id number]   YOB: 1955   Date of Visit: 11/11/2020       Dear Dr. Jesus Manuel Ortiz:    Thank you for referring Leonardo Pisano to me for evaluation. Attached you will find relevant portions of my assessment and plan of care.    If you have questions, please do not hesitate to call me. I look forward to following Leonardo Pisano along with you.    Sincerely,    Lila Crook PA-C    Enclosure  CC:  No Recipients    If you would like to receive this communication electronically, please contact [email or web address] [facility] [employer] [email or web address] or [phone] to request more information on [employer] Link access.    For providers and/or their staff who would like to refer a patient to [facility], please contact us through our one-stop-shop provider referral line, [facility] , at [phone].    If you feel you have received this communication in error or would no longer like to receive these types of communications, please e-mail [email or web address]

## 2020-11-11 NOTE — PROGRESS NOTES
Neurosurgery  History & Physical    SUBJECTIVE:     Chief Complaint: neck pain    History of Present Illness:  Patient is a pleasant 66 y/o male with PMHx of HTN, HLD, and chronic low back pain who presents to clinic today for evaluation of neck pain. He was referred by PCP, Dr. Jesus Manuel Ortiz after MRI cervical spine showed stenosis at C5-C6 and C6-C7. Patient reports pain is located at the bottom of the neck into the left paraspinal muscle and is nagging in nature. The patient begain approx 1.5 months ago with no trauma prior. He reports 2 weeks ago that the neck pain has almost completely resolved. He reports now it just feels like a muscle strain that intermittently bothers him. He takes Aleve as needed with good relief. He reports he did PT in the past for his lower back which was helpful. He denies any pain in the extremities, weakness in the extremities, numbness/tingling in the extremities, clumsiness, gait dysfunction,  weakness.    Review of patient's allergies indicates:   Allergen Reactions    No known allergies        Current Outpatient Medications   Medication Sig Dispense Refill    hydroCHLOROthiazide (HYDRODIURIL) 12.5 MG Tab Take 1 tablet (12.5 mg total) by mouth once daily. 30 tablet 5     No current facility-administered medications for this visit.        Past Medical History:   Diagnosis Date    Degenerative disc disease     Essential hypertension 4/3/2018    Hepatitis     Hyperlipidemia      Past Surgical History:   Procedure Laterality Date    SHOULDER SURGERY  4/25/12    rt shoulder     Family History     Problem Relation (Age of Onset)    Cancer Mother    Diabetes Daughter    Heart disease Father    Hyperlipidemia Sister, Sister        Social History     Socioeconomic History    Marital status:      Spouse name: Not on file    Number of children: 2    Years of education: Not on file    Highest education level: Not on file   Occupational History     Employer: [employer]   Social Needs    Financial resource strain: Not hard at all    Food insecurity     Worry: Never true     Inability: Never true    Transportation needs     Medical: No     Non-medical: No   Tobacco Use    Smoking status: Never Smoker    Smokeless tobacco: Never Used   Substance and Sexual Activity    Alcohol use: No     Frequency: 2-4 times a month     Drinks per session: 1 or 2     Binge frequency: Never    Drug use: Not on file    Sexual activity: Not on file   Lifestyle    Physical activity     Days per week: 5 days     Minutes per session: 30 min    Stress: Not at all   Relationships    Social connections     Talks on phone: More than three times a week     Gets together: Three times a week     Attends Buddhist service: Not on file     Active member of club or organization: Yes     Attends meetings of clubs or organizations: More than 4 times per year     Relationship status:    Other Topics Concern    Not on file   Social History Narrative    Not on file       Review of Systems   Constitutional: Negative for activity change, chills and fever.   Eyes: Negative for visual disturbance.   Respiratory: Negative for cough and shortness of breath.    Cardiovascular: Negative for chest pain.   Gastrointestinal: Negative for diarrhea and nausea.   Genitourinary: Negative for difficulty urinating.   Musculoskeletal: Positive for neck pain. Negative for gait problem and neck stiffness.   Neurological: Negative for dizziness, weakness, numbness and headaches.       OBJECTIVE:     Vital Signs     There is no height or weight on file to calculate BMI.      Neurosurgery Physical Exam  General: well developed, well nourished, no distress.   Head: normocephalic, atraumatic  Neurologic: Alert and oriented. Thought content appropriate.  Cranial nerves: face symmetric, tongue midline, CN II-XII grossly intact.   Eyes: pupils equal, round, reactive to light with accommodation, EOMI.   Sensory: intact to  light touch throughout    Motor Strength: Moves all extremities spontaneously with good tone.  Full strength upper and lower extremities. No abnormal movements seen.     Strength  Deltoids Triceps Biceps Wrist Extension Wrist Flexion Hand    Upper: R 5/5 5/5 5/5 5/5 5/5 5/5    L 5/5 5/5 5/5 5/5 5/5 5/5     Iliopsoas Quadriceps Knee  Flexion Tibialis  anterior Gastro- cnemius EHL   Lower: R 5/5 5/5 5/5 5/5 5/5 5/5    L 5/5 5/5 5/5 5/5 5/5 5/5     Reflexes:   DTR: 2+ symmetrically throughout.  Dickinson's: Negative.  Clonus: Negative.    Gait stable, fluid.   Tandem Gait: No difficulty  Able to walk on heels & toes     Cervical:   ROM: Full with flexion, extension, lateral rotation and ear-to-shoulder bend.   Midline TTP: Negative.  Lhermitte's: Negative.       Diagnostic Results:  I independently reviewed the imaging.   MRI cervical spine dated 10/23/2020 showing C6-C7 broad based posterior disc osteophyte complex that effaces the ventral thecal sac and compresses the ventral spinal cord. This causes moderate spinal canal stenosis and severe bilateral neural foraminal narrowing. There is also a broad based posterior disc osteophyte complex at C5-C6 that also effaces the ventral thecal sac. This causes mild spinal canal stenosis and severe left and moderate to severe right foraminal narrowing.     ASSESSMENT/PLAN:     64 y/o male with acute neck pain which is resolving and MRI cervical spine showing degenerative changes worst at C6-7 causing moderate spinal canal stenosis and severe b/l foraminal stenosis, no T2 signal changes. No symptoms of cervical spinal stenosis and physical exam WNL.     -Recommend PT for neck pain/strain with dry needling   -I discussed with the patient symptoms of cervical spinal stenosis that would warrant further evaluation such as radiation of pain into the extremities, numbness/tingling in the extremities, gait instability, clumsiness.     I would like the patient to follow-up in clinic  PRN. I have encouraged him to contact the clinic with any questions, concerns, or adverse clinical changes. He verbalized understanding.         Note dictated with voice recognition software, please excuse any grammatical errors.

## 2020-11-11 NOTE — PROGRESS NOTES
Digital Medicine: Clinician Follow-Up    Called patient for follow up after starting HCTZ. He is doing well with no complaints. He says that he didn't go get lab work done because he forgot what day he was supposed to go. Patient reports that he will go get labs done next week.     The history is provided by the patient.      Review of patient's allergies indicates:   -- No known allergies   Follow-up reason(s): medication change follow-up.     Hypertension    Readings are trending down   Patient did make medication change.    Is patient tolerating med change? yes            Last 5 Patient Entered Readings                                      Current 30 Day Average: 130/74     Recent Readings 11/11/2020 11/10/2020 11/8/2020 11/7/2020 11/6/2020    SBP (mmHg) 128 130 131 128 139    DBP (mmHg) 77 71 79 74 72    Pulse 66 69 66 65 69                 Depression Screening  Did not address depression screening.    Sleep Apnea Screening    Did not address sleep apnea screening.     Medication Affordability Screening  Did not address medication affordability screening.     Medication Adherence-Medication adherence was asssessed.  Patient continue taking medication as prescribed.            ASSESSMENT(S)  Patients BP average is 130/74 mmHg, which is above goal. Patient's BP goal is less than or equal to 130/80.     Hypertension Plan  Continue current therapy.  Standing order for BMP. Patient to go get BMP within the next two weeks.      Addressed patient questions and patient has my contact information if needed prior to next outreach. Patient verbalizes understanding.             There are no preventive care reminders to display for this patient.  There are no preventive care reminders to display for this patient.      Hypertension Medications     hydroCHLOROthiazide (HYDRODIURIL) 12.5 MG Tab Take 1 tablet (12.5 mg total) by mouth once daily.

## 2020-11-18 ENCOUNTER — PATIENT OUTREACH (OUTPATIENT)
Dept: OTHER | Facility: OTHER | Age: 65
End: 2020-11-18

## 2020-11-20 ENCOUNTER — LAB VISIT (OUTPATIENT)
Dept: LAB | Facility: HOSPITAL | Age: 65
End: 2020-11-20
Attending: INTERNAL MEDICINE
Payer: COMMERCIAL

## 2020-11-20 DIAGNOSIS — I10 ESSENTIAL HYPERTENSION: ICD-10-CM

## 2020-11-20 LAB
ANION GAP SERPL CALC-SCNC: 10 MMOL/L (ref 8–16)
BUN SERPL-MCNC: 21 MG/DL (ref 8–23)
CALCIUM SERPL-MCNC: 9.8 MG/DL (ref 8.7–10.5)
CHLORIDE SERPL-SCNC: 102 MMOL/L (ref 95–110)
CO2 SERPL-SCNC: 28 MMOL/L (ref 23–29)
CREAT SERPL-MCNC: 0.9 MG/DL (ref 0.5–1.4)
EST. GFR  (AFRICAN AMERICAN): >60 ML/MIN/1.73 M^2
EST. GFR  (NON AFRICAN AMERICAN): >60 ML/MIN/1.73 M^2
GLUCOSE SERPL-MCNC: 101 MG/DL (ref 70–110)
POTASSIUM SERPL-SCNC: 4.1 MMOL/L (ref 3.5–5.1)
SODIUM SERPL-SCNC: 140 MMOL/L (ref 136–145)

## 2020-11-20 PROCEDURE — 36415 COLL VENOUS BLD VENIPUNCTURE: CPT

## 2020-11-20 PROCEDURE — 80048 BASIC METABOLIC PNL TOTAL CA: CPT

## 2020-11-23 ENCOUNTER — PATIENT OUTREACH (OUTPATIENT)
Dept: OTHER | Facility: OTHER | Age: 65
End: 2020-11-23

## 2020-11-30 NOTE — PROGRESS NOTES
Digital Medicine: Clinician Follow-Up    Called patient to review BMP results.     The history is provided by the patient.   Follow-up reason(s): lab follow up.     Hypertension    Patient's blood pressure is stable.         Last 5 Patient Entered Readings                                      Current 30 Day Average: 131/75     Recent Readings 11/29/2020 11/29/2020 11/25/2020 11/25/2020 11/22/2020    SBP (mmHg) 129 149 125 137 126    DBP (mmHg) 76 79 72 75 74    Pulse 57 57 59 59 57                 Depression Screening  Did not address depression screening.    Sleep Apnea Screening    Did not address sleep apnea screening.     Medication Affordability Screening  Did not address medication affordability screening.     Medication Adherence-Medication adherence was asssessed.  Patient continue taking medication as prescribed.            ASSESSMENT(S)  Patients BP average is 131/75 mmHg, which is above goal. Patient's BP goal is less than or equal to 130/80.     BMP 11/20/20 WNL after starting HCTZ      Hypertension Plan  Continue current therapy.       Addressed patient questions and patient has my contact information if needed prior to next outreach. Patient verbalizes understanding.             There are no preventive care reminders to display for this patient.  There are no preventive care reminders to display for this patient.      Hypertension Medications     hydroCHLOROthiazide (HYDRODIURIL) 12.5 MG Tab Take 1 tablet (12.5 mg total) by mouth once daily.

## 2020-12-09 NOTE — PROGRESS NOTES
"Digital Medicine: Health  Follow-Up    The history is provided by the patient.             Reason for review: Blood pressure at goal        Topics Covered on Call: device use and stress    Additional Follow-up details: Patient stated that he has noticed a bit of fluctuation in his BP readings, but has been a bit stressed lately. When asked about what he does to manage his stress, the patient stated that he prays. He said that praying helps to calm him, and ultimately makes him feel better.     The patient's readings have fluctuated recently, but the patient mentioned that he usually has to "sit still" a while before taking a BP reading. He notices that his first readings will be a little elevated, and lower as he waits longer to take a follow up reading.     The patient will continue to use his stress management strategy, in order to control his BP readings. The patient did not have any questions or concerns and did not require any additional resources from me at this time.             Diet-Not assessed          Physical Activity-Not assessed    Medication Adherence-Medication adherence was assessed.        Substance, Sleep, Stress-change  stress-assessed  Details:Patient has been more stressed lately, but has a stress management strategy that works for him.   Intervention(s): stress management strategy, praying    Sleep-  Details:  Intervention(s):    Alcohol -  Details:  Intervention(s):    Tobacco-  Details:  Intervention(s):          Additional monitoring needed.  Continue current diet/physical activity routine.  Reviewed Device Techniques.     Addressed patient questions and patient has my contact information if needed prior to next outreach. Patient verbalizes understanding.      Explained the importance of self-monitoring and medication adherence. Encouraged the patient to communicate with their health  for lifestyle modifications to help improve or maintain a healthy lifestyle.               There " are no preventive care reminders to display for this patient.      Last 5 Patient Entered Readings                                      Current 30 Day Average: 131/75     Recent Readings 12/8/2020 12/8/2020 12/8/2020 12/3/2020 12/3/2020    SBP (mmHg) 133 159 141 135 137    DBP (mmHg) 69 76 74 74 78    Pulse 56 58 59 52 55

## 2021-03-03 ENCOUNTER — TELEPHONE (OUTPATIENT)
Dept: INTERNAL MEDICINE | Facility: CLINIC | Age: 66
End: 2021-03-03

## 2021-03-03 ENCOUNTER — PATIENT OUTREACH (OUTPATIENT)
Dept: ADMINISTRATIVE | Facility: HOSPITAL | Age: 66
End: 2021-03-03

## 2021-04-13 ENCOUNTER — PATIENT OUTREACH (OUTPATIENT)
Dept: ADMINISTRATIVE | Facility: HOSPITAL | Age: 66
End: 2021-04-13

## 2021-04-13 ENCOUNTER — PATIENT MESSAGE (OUTPATIENT)
Dept: ADMINISTRATIVE | Facility: HOSPITAL | Age: 66
End: 2021-04-13

## 2021-04-13 DIAGNOSIS — Z12.11 COLON CANCER SCREENING: Primary | ICD-10-CM

## 2021-05-04 ENCOUNTER — PATIENT MESSAGE (OUTPATIENT)
Dept: RESEARCH | Facility: HOSPITAL | Age: 66
End: 2021-05-04

## 2021-06-01 ENCOUNTER — PATIENT MESSAGE (OUTPATIENT)
Dept: INTERNAL MEDICINE | Facility: CLINIC | Age: 66
End: 2021-06-01

## 2021-06-02 ENCOUNTER — INFUSION (OUTPATIENT)
Dept: INFECTIOUS DISEASES | Facility: HOSPITAL | Age: 66
DRG: 177 | End: 2021-06-02
Attending: FAMILY MEDICINE
Payer: COMMERCIAL

## 2021-06-02 ENCOUNTER — HOSPITAL ENCOUNTER (INPATIENT)
Facility: HOSPITAL | Age: 66
LOS: 4 days | Discharge: HOME | DRG: 177 | End: 2021-06-07
Attending: SURGERY | Admitting: FAMILY MEDICINE
Payer: COMMERCIAL

## 2021-06-02 VITALS
RESPIRATION RATE: 18 BRPM | OXYGEN SATURATION: 95 % | SYSTOLIC BLOOD PRESSURE: 117 MMHG | TEMPERATURE: 98 F | HEART RATE: 62 BPM | DIASTOLIC BLOOD PRESSURE: 63 MMHG

## 2021-06-02 DIAGNOSIS — U07.1 COVID-19 VIRUS INFECTION: ICD-10-CM

## 2021-06-02 DIAGNOSIS — U07.1 COVID-19 VIRUS INFECTION: Primary | ICD-10-CM

## 2021-06-02 DIAGNOSIS — R79.89 TROPONIN I ABOVE REFERENCE RANGE: ICD-10-CM

## 2021-06-02 DIAGNOSIS — R09.02 HYPOXIA: Primary | ICD-10-CM

## 2021-06-02 LAB
ALBUMIN SERPL BCP-MCNC: 2.8 G/DL (ref 3.5–5.2)
ALLENS TEST: ABNORMAL
ALP SERPL-CCNC: 355 U/L (ref 55–135)
ALT SERPL W/O P-5'-P-CCNC: 101 U/L (ref 10–44)
ANION GAP SERPL CALC-SCNC: 14 MMOL/L (ref 8–16)
APTT BLDCRRT: 25.6 SEC (ref 21–32)
AST SERPL-CCNC: 77 U/L (ref 10–40)
BASOPHILS # BLD AUTO: ABNORMAL K/UL (ref 0–0.2)
BASOPHILS NFR BLD: 0 % (ref 0–1.9)
BILIRUB SERPL-MCNC: 1.4 MG/DL (ref 0.1–1)
BNP SERPL-MCNC: 68 PG/ML (ref 0–99)
BUN SERPL-MCNC: 21 MG/DL (ref 8–23)
CALCIUM SERPL-MCNC: 8.8 MG/DL (ref 8.7–10.5)
CHLORIDE SERPL-SCNC: 101 MMOL/L (ref 95–110)
CK MB SERPL-MCNC: 1.6 NG/ML (ref 0.1–6.5)
CK MB SERPL-RTO: 0.7 % (ref 0–5)
CK SERPL-CCNC: 221 U/L (ref 20–200)
CK SERPL-CCNC: 221 U/L (ref 20–200)
CO2 SERPL-SCNC: 23 MMOL/L (ref 23–29)
CREAT SERPL-MCNC: 0.9 MG/DL (ref 0.5–1.4)
D DIMER PPP IA.FEU-MCNC: 1.68 MG/L FEU
DELSYS: ABNORMAL
DIFFERENTIAL METHOD: ABNORMAL
EOSINOPHIL # BLD AUTO: ABNORMAL K/UL (ref 0–0.5)
EOSINOPHIL NFR BLD: 0 % (ref 0–8)
ERYTHROCYTE [DISTWIDTH] IN BLOOD BY AUTOMATED COUNT: 13 % (ref 11.5–14.5)
ERYTHROCYTE [SEDIMENTATION RATE] IN BLOOD BY WESTERGREN METHOD: 64 MM/HR (ref 0–10)
EST. GFR  (AFRICAN AMERICAN): >60 ML/MIN/1.73 M^2
EST. GFR  (NON AFRICAN AMERICAN): >60 ML/MIN/1.73 M^2
GLUCOSE SERPL-MCNC: 130 MG/DL (ref 70–110)
HCO3 UR-SCNC: 26.5 MMOL/L (ref 22–26)
HCT VFR BLD AUTO: 41 % (ref 40–54)
HGB BLD-MCNC: 14 G/DL (ref 14–18)
IMM GRANULOCYTES # BLD AUTO: ABNORMAL K/UL (ref 0–0.04)
IMM GRANULOCYTES NFR BLD AUTO: ABNORMAL % (ref 0–0.5)
INR PPP: 1 (ref 0.8–1.2)
LACTATE SERPL-SCNC: 2.3 MMOL/L (ref 0.5–2.2)
LDH SERPL L TO P-CCNC: 545 U/L (ref 110–260)
LYMPHOCYTES # BLD AUTO: ABNORMAL K/UL (ref 1–4.8)
LYMPHOCYTES NFR BLD: 3 % (ref 18–48)
MCH RBC QN AUTO: 30.8 PG (ref 27–31)
MCHC RBC AUTO-ENTMCNC: 34.1 G/DL (ref 32–36)
MCV RBC AUTO: 90 FL (ref 82–98)
MONOCYTES # BLD AUTO: ABNORMAL K/UL (ref 0.3–1)
MONOCYTES NFR BLD: 0 % (ref 4–15)
NEUTROPHILS NFR BLD: 85 % (ref 38–73)
NEUTS BAND NFR BLD MANUAL: 12 %
NRBC BLD-RTO: 0 /100 WBC
PCO2 BLDA: 31 MMHG (ref 35–45)
PH SMN: 7.54 [PH] (ref 7.35–7.45)
PLATELET # BLD AUTO: 266 K/UL (ref 150–450)
PLATELET BLD QL SMEAR: ABNORMAL
PMV BLD AUTO: 8.7 FL (ref 9.2–12.9)
PO2 BLDA: 43 MMHG (ref 75–100)
POC BE: 4.5 MMOL/L (ref -2–2)
POC COHB: 2.2 % (ref 0–3)
POC METHB: 1.3 % (ref 0–1.5)
POC O2HB ARTERIAL: 84 % (ref 94–100)
POC SATURATED O2: 87 % (ref 90–100)
POC TCO2: 27.5 MMOL/L
POC THB: 13.7 G/DL (ref 12–18)
POTASSIUM SERPL-SCNC: 3.7 MMOL/L (ref 3.5–5.1)
PROCALCITONIN SERPL IA-MCNC: 0.48 NG/ML
PROT SERPL-MCNC: 7.2 G/DL (ref 6–8.4)
PROTHROMBIN TIME: 10.5 SEC (ref 9–12.5)
RBC # BLD AUTO: 4.54 M/UL (ref 4.6–6.2)
SARS-COV-2 RDRP RESP QL NAA+PROBE: POSITIVE
SITE: ABNORMAL
SODIUM SERPL-SCNC: 138 MMOL/L (ref 136–145)
TROPONIN I SERPL DL<=0.01 NG/ML-MCNC: 0.08 NG/ML (ref 0–0.03)
TROPONIN I SERPL DL<=0.01 NG/ML-MCNC: 0.08 NG/ML (ref 0–0.03)
WBC # BLD AUTO: 19.13 K/UL (ref 3.9–12.7)

## 2021-06-02 PROCEDURE — 93010 EKG 12-LEAD: ICD-10-PCS | Mod: ,,, | Performed by: INTERNAL MEDICINE

## 2021-06-02 PROCEDURE — 82803 BLOOD GASES ANY COMBINATION: CPT | Performed by: SURGERY

## 2021-06-02 PROCEDURE — 99900035 HC TECH TIME PER 15 MIN (STAT)

## 2021-06-02 PROCEDURE — 83880 ASSAY OF NATRIURETIC PEPTIDE: CPT | Performed by: SURGERY

## 2021-06-02 PROCEDURE — 85730 THROMBOPLASTIN TIME PARTIAL: CPT | Performed by: SURGERY

## 2021-06-02 PROCEDURE — 63600175 PHARM REV CODE 636 W HCPCS: Performed by: FAMILY MEDICINE

## 2021-06-02 PROCEDURE — 96372 THER/PROPH/DIAG INJ SC/IM: CPT | Mod: 59

## 2021-06-02 PROCEDURE — 85027 COMPLETE CBC AUTOMATED: CPT | Performed by: SURGERY

## 2021-06-02 PROCEDURE — 82728 ASSAY OF FERRITIN: CPT | Performed by: SURGERY

## 2021-06-02 PROCEDURE — 82550 ASSAY OF CK (CPK): CPT | Performed by: SURGERY

## 2021-06-02 PROCEDURE — G0378 HOSPITAL OBSERVATION PER HR: HCPCS

## 2021-06-02 PROCEDURE — 36600 WITHDRAWAL OF ARTERIAL BLOOD: CPT

## 2021-06-02 PROCEDURE — 96365 THER/PROPH/DIAG IV INF INIT: CPT

## 2021-06-02 PROCEDURE — U0002 COVID-19 LAB TEST NON-CDC: HCPCS | Performed by: SURGERY

## 2021-06-02 PROCEDURE — 83615 LACTATE (LD) (LDH) ENZYME: CPT | Performed by: SURGERY

## 2021-06-02 PROCEDURE — 84484 ASSAY OF TROPONIN QUANT: CPT | Mod: 91 | Performed by: SURGERY

## 2021-06-02 PROCEDURE — 80053 COMPREHEN METABOLIC PANEL: CPT | Performed by: SURGERY

## 2021-06-02 PROCEDURE — 96375 TX/PRO/DX INJ NEW DRUG ADDON: CPT

## 2021-06-02 PROCEDURE — 63600175 PHARM REV CODE 636 W HCPCS: Performed by: SURGERY

## 2021-06-02 PROCEDURE — 87040 BLOOD CULTURE FOR BACTERIA: CPT | Mod: 59 | Performed by: SURGERY

## 2021-06-02 PROCEDURE — 84145 PROCALCITONIN (PCT): CPT | Performed by: SURGERY

## 2021-06-02 PROCEDURE — 99291 CRITICAL CARE FIRST HOUR: CPT | Mod: 25

## 2021-06-02 PROCEDURE — 83605 ASSAY OF LACTIC ACID: CPT | Performed by: SURGERY

## 2021-06-02 PROCEDURE — 93005 ELECTROCARDIOGRAM TRACING: CPT

## 2021-06-02 PROCEDURE — 94760 N-INVAS EAR/PLS OXIMETRY 1: CPT

## 2021-06-02 PROCEDURE — 94761 N-INVAS EAR/PLS OXIMETRY MLT: CPT

## 2021-06-02 PROCEDURE — 25000003 PHARM REV CODE 250: Performed by: SURGERY

## 2021-06-02 PROCEDURE — 27000221 HC OXYGEN, UP TO 24 HOURS

## 2021-06-02 PROCEDURE — 86140 C-REACTIVE PROTEIN: CPT | Performed by: SURGERY

## 2021-06-02 PROCEDURE — 93010 ELECTROCARDIOGRAM REPORT: CPT | Mod: ,,, | Performed by: INTERNAL MEDICINE

## 2021-06-02 PROCEDURE — 85651 RBC SED RATE NONAUTOMATED: CPT | Performed by: SURGERY

## 2021-06-02 PROCEDURE — 85379 FIBRIN DEGRADATION QUANT: CPT | Performed by: SURGERY

## 2021-06-02 PROCEDURE — 36415 COLL VENOUS BLD VENIPUNCTURE: CPT | Performed by: SURGERY

## 2021-06-02 PROCEDURE — 85610 PROTHROMBIN TIME: CPT | Performed by: SURGERY

## 2021-06-02 PROCEDURE — M0243 CASIRIVI AND IMDEVI INFUSION: HCPCS | Performed by: FAMILY MEDICINE

## 2021-06-02 PROCEDURE — 25000003 PHARM REV CODE 250: Performed by: FAMILY MEDICINE

## 2021-06-02 PROCEDURE — 85007 BL SMEAR W/DIFF WBC COUNT: CPT | Performed by: SURGERY

## 2021-06-02 RX ORDER — ACETAMINOPHEN 325 MG/1
650 TABLET ORAL ONCE AS NEEDED
Status: DISCONTINUED | OUTPATIENT
Start: 2021-06-02 | End: 2021-06-07 | Stop reason: HOSPADM

## 2021-06-02 RX ORDER — SODIUM CHLORIDE 0.9 % (FLUSH) 0.9 %
10 SYRINGE (ML) INJECTION
Status: DISCONTINUED | OUTPATIENT
Start: 2021-06-02 | End: 2021-06-07 | Stop reason: HOSPADM

## 2021-06-02 RX ORDER — IBUPROFEN 800 MG/1
800 TABLET ORAL
Status: COMPLETED | OUTPATIENT
Start: 2021-06-02 | End: 2021-06-02

## 2021-06-02 RX ORDER — ALBUTEROL SULFATE 90 UG/1
2 AEROSOL, METERED RESPIRATORY (INHALATION) EVERY 6 HOURS PRN
Status: DISCONTINUED | OUTPATIENT
Start: 2021-06-02 | End: 2021-06-07 | Stop reason: HOSPADM

## 2021-06-02 RX ORDER — FAMOTIDINE 20 MG/1
20 TABLET, FILM COATED ORAL 2 TIMES DAILY
Status: DISCONTINUED | OUTPATIENT
Start: 2021-06-02 | End: 2021-06-07 | Stop reason: HOSPADM

## 2021-06-02 RX ORDER — ONDANSETRON 2 MG/ML
4 INJECTION INTRAMUSCULAR; INTRAVENOUS EVERY 8 HOURS PRN
Status: DISCONTINUED | OUTPATIENT
Start: 2021-06-02 | End: 2021-06-07 | Stop reason: HOSPADM

## 2021-06-02 RX ORDER — DIPHENHYDRAMINE HYDROCHLORIDE 50 MG/ML
25 INJECTION INTRAMUSCULAR; INTRAVENOUS ONCE AS NEEDED
Status: DISCONTINUED | OUTPATIENT
Start: 2021-06-02 | End: 2021-06-07 | Stop reason: HOSPADM

## 2021-06-02 RX ORDER — MONTELUKAST SODIUM 10 MG/1
10 TABLET ORAL DAILY
Status: DISCONTINUED | OUTPATIENT
Start: 2021-06-03 | End: 2021-06-07 | Stop reason: HOSPADM

## 2021-06-02 RX ORDER — DEXAMETHASONE SODIUM PHOSPHATE 4 MG/ML
6 INJECTION, SOLUTION INTRA-ARTICULAR; INTRALESIONAL; INTRAMUSCULAR; INTRAVENOUS; SOFT TISSUE EVERY 24 HOURS
Status: DISCONTINUED | OUTPATIENT
Start: 2021-06-03 | End: 2021-06-07 | Stop reason: HOSPADM

## 2021-06-02 RX ORDER — EPINEPHRINE 0.3 MG/.3ML
0.3 INJECTION SUBCUTANEOUS
Status: DISCONTINUED | OUTPATIENT
Start: 2021-06-02 | End: 2021-06-07 | Stop reason: HOSPADM

## 2021-06-02 RX ORDER — ASPIRIN 325 MG
50000 TABLET, DELAYED RELEASE (ENTERIC COATED) ORAL DAILY
Status: DISCONTINUED | OUTPATIENT
Start: 2021-06-03 | End: 2021-06-07 | Stop reason: HOSPADM

## 2021-06-02 RX ORDER — ACETAMINOPHEN 500 MG
1000 TABLET ORAL
Status: COMPLETED | OUTPATIENT
Start: 2021-06-02 | End: 2021-06-02

## 2021-06-02 RX ORDER — TALC
6 POWDER (GRAM) TOPICAL NIGHTLY PRN
Status: DISCONTINUED | OUTPATIENT
Start: 2021-06-02 | End: 2021-06-07 | Stop reason: HOSPADM

## 2021-06-02 RX ORDER — ONDANSETRON 4 MG/1
4 TABLET, ORALLY DISINTEGRATING ORAL ONCE AS NEEDED
Status: DISCONTINUED | OUTPATIENT
Start: 2021-06-02 | End: 2021-06-07 | Stop reason: HOSPADM

## 2021-06-02 RX ORDER — HYDROCODONE BITARTRATE AND ACETAMINOPHEN 5; 325 MG/1; MG/1
1 TABLET ORAL EVERY 4 HOURS PRN
Status: DISCONTINUED | OUTPATIENT
Start: 2021-06-02 | End: 2021-06-07 | Stop reason: HOSPADM

## 2021-06-02 RX ORDER — ACETAMINOPHEN 325 MG/1
650 TABLET ORAL EVERY 8 HOURS PRN
Status: DISCONTINUED | OUTPATIENT
Start: 2021-06-02 | End: 2021-06-07 | Stop reason: HOSPADM

## 2021-06-02 RX ORDER — TALC
6 POWDER (GRAM) TOPICAL DAILY
Status: DISCONTINUED | OUTPATIENT
Start: 2021-06-03 | End: 2021-06-07 | Stop reason: HOSPADM

## 2021-06-02 RX ORDER — ASCORBIC ACID 500 MG
500 TABLET ORAL 2 TIMES DAILY
Status: DISCONTINUED | OUTPATIENT
Start: 2021-06-02 | End: 2021-06-07 | Stop reason: HOSPADM

## 2021-06-02 RX ORDER — ENOXAPARIN SODIUM 100 MG/ML
0.5 INJECTION SUBCUTANEOUS
Status: DISCONTINUED | OUTPATIENT
Start: 2021-06-02 | End: 2021-06-07 | Stop reason: HOSPADM

## 2021-06-02 RX ADMIN — CEFTRIAXONE 1 G: 1 INJECTION, SOLUTION INTRAVENOUS at 06:06

## 2021-06-02 RX ADMIN — ACETAMINOPHEN 1000 MG: 500 TABLET ORAL at 05:06

## 2021-06-02 RX ADMIN — ENOXAPARIN SODIUM 50 MG: 60 INJECTION SUBCUTANEOUS at 08:06

## 2021-06-02 RX ADMIN — AZITHROMYCIN MONOHYDRATE 500 MG: 500 INJECTION, POWDER, LYOPHILIZED, FOR SOLUTION INTRAVENOUS at 06:06

## 2021-06-02 RX ADMIN — IBUPROFEN 800 MG: 800 TABLET, FILM COATED ORAL at 05:06

## 2021-06-02 RX ADMIN — FAMOTIDINE 20 MG: 20 TABLET, FILM COATED ORAL at 08:06

## 2021-06-02 RX ADMIN — OXYCODONE HYDROCHLORIDE AND ACETAMINOPHEN 500 MG: 500 TABLET ORAL at 08:06

## 2021-06-02 RX ADMIN — CASIRIVIMAB 1200 MG: 300 INJECTION, SOLUTION, CONCENTRATE INTRAVENOUS at 10:06

## 2021-06-03 PROBLEM — U07.1 PNEUMONIA DUE TO COVID-19 VIRUS: Status: ACTIVE | Noted: 2021-06-03

## 2021-06-03 PROBLEM — J12.82 PNEUMONIA DUE TO COVID-19 VIRUS: Status: ACTIVE | Noted: 2021-06-03

## 2021-06-03 PROBLEM — R79.89 ELEVATED LFTS: Status: ACTIVE | Noted: 2021-06-03

## 2021-06-03 PROBLEM — R79.89 TROPONIN I ABOVE REFERENCE RANGE: Status: ACTIVE | Noted: 2021-06-03

## 2021-06-03 LAB
ALBUMIN SERPL BCP-MCNC: 2.4 G/DL (ref 3.5–5.2)
ALBUMIN SERPL BCP-MCNC: 2.4 G/DL (ref 3.5–5.2)
ALP SERPL-CCNC: 278 U/L (ref 55–135)
ALP SERPL-CCNC: 278 U/L (ref 55–135)
ALT SERPL W/O P-5'-P-CCNC: 86 U/L (ref 10–44)
ALT SERPL W/O P-5'-P-CCNC: 86 U/L (ref 10–44)
ANION GAP SERPL CALC-SCNC: 10 MMOL/L (ref 8–16)
ANION GAP SERPL CALC-SCNC: 10 MMOL/L (ref 8–16)
ANISOCYTOSIS BLD QL SMEAR: SLIGHT
AST SERPL-CCNC: 64 U/L (ref 10–40)
AST SERPL-CCNC: 64 U/L (ref 10–40)
BASOPHILS # BLD AUTO: ABNORMAL K/UL (ref 0–0.2)
BASOPHILS NFR BLD: 0 % (ref 0–1.9)
BILIRUB SERPL-MCNC: 1.1 MG/DL (ref 0.1–1)
BILIRUB SERPL-MCNC: 1.1 MG/DL (ref 0.1–1)
BUN SERPL-MCNC: 24 MG/DL (ref 8–23)
BUN SERPL-MCNC: 24 MG/DL (ref 8–23)
CALCIUM SERPL-MCNC: 8.4 MG/DL (ref 8.7–10.5)
CALCIUM SERPL-MCNC: 8.4 MG/DL (ref 8.7–10.5)
CHLORIDE SERPL-SCNC: 104 MMOL/L (ref 95–110)
CHLORIDE SERPL-SCNC: 104 MMOL/L (ref 95–110)
CO2 SERPL-SCNC: 27 MMOL/L (ref 23–29)
CO2 SERPL-SCNC: 27 MMOL/L (ref 23–29)
CREAT SERPL-MCNC: 1 MG/DL (ref 0.5–1.4)
CREAT SERPL-MCNC: 1 MG/DL (ref 0.5–1.4)
CRP SERPL-MCNC: 247.6 MG/L (ref 0–8.2)
DACRYOCYTES BLD QL SMEAR: ABNORMAL
DIFFERENTIAL METHOD: ABNORMAL
EOSINOPHIL # BLD AUTO: ABNORMAL K/UL (ref 0–0.5)
EOSINOPHIL NFR BLD: 0 % (ref 0–8)
ERYTHROCYTE [DISTWIDTH] IN BLOOD BY AUTOMATED COUNT: 13.2 % (ref 11.5–14.5)
EST. GFR  (AFRICAN AMERICAN): >60 ML/MIN/1.73 M^2
EST. GFR  (AFRICAN AMERICAN): >60 ML/MIN/1.73 M^2
EST. GFR  (NON AFRICAN AMERICAN): >60 ML/MIN/1.73 M^2
EST. GFR  (NON AFRICAN AMERICAN): >60 ML/MIN/1.73 M^2
FERRITIN SERPL-MCNC: 4230 NG/ML (ref 20–300)
GLUCOSE SERPL-MCNC: 113 MG/DL (ref 70–110)
GLUCOSE SERPL-MCNC: 113 MG/DL (ref 70–110)
HCT VFR BLD AUTO: 39.3 % (ref 40–54)
HGB BLD-MCNC: 13.1 G/DL (ref 14–18)
IMM GRANULOCYTES # BLD AUTO: ABNORMAL K/UL (ref 0–0.04)
IMM GRANULOCYTES NFR BLD AUTO: ABNORMAL % (ref 0–0.5)
LYMPHOCYTES # BLD AUTO: ABNORMAL K/UL (ref 1–4.8)
LYMPHOCYTES NFR BLD: 4 % (ref 18–48)
MCH RBC QN AUTO: 30.6 PG (ref 27–31)
MCHC RBC AUTO-ENTMCNC: 33.3 G/DL (ref 32–36)
MCV RBC AUTO: 92 FL (ref 82–98)
MONOCYTES # BLD AUTO: ABNORMAL K/UL (ref 0.3–1)
MONOCYTES NFR BLD: 3 % (ref 4–15)
NEUTROPHILS NFR BLD: 61 % (ref 38–73)
NEUTS BAND NFR BLD MANUAL: 32 %
NRBC BLD-RTO: 0 /100 WBC
PLATELET # BLD AUTO: 252 K/UL (ref 150–450)
PLATELET BLD QL SMEAR: ABNORMAL
PMV BLD AUTO: 8.8 FL (ref 9.2–12.9)
POTASSIUM SERPL-SCNC: 3.7 MMOL/L (ref 3.5–5.1)
POTASSIUM SERPL-SCNC: 3.7 MMOL/L (ref 3.5–5.1)
PROT SERPL-MCNC: 6.4 G/DL (ref 6–8.4)
PROT SERPL-MCNC: 6.4 G/DL (ref 6–8.4)
RBC # BLD AUTO: 4.28 M/UL (ref 4.6–6.2)
SODIUM SERPL-SCNC: 141 MMOL/L (ref 136–145)
SODIUM SERPL-SCNC: 141 MMOL/L (ref 136–145)
TROPONIN I SERPL DL<=0.01 NG/ML-MCNC: 0.06 NG/ML (ref 0–0.03)
TROPONIN I SERPL DL<=0.01 NG/ML-MCNC: 0.06 NG/ML (ref 0–0.03)
TROPONIN I SERPL DL<=0.01 NG/ML-MCNC: 0.07 NG/ML (ref 0–0.03)
WBC # BLD AUTO: 24.41 K/UL (ref 3.9–12.7)

## 2021-06-03 PROCEDURE — 94760 N-INVAS EAR/PLS OXIMETRY 1: CPT

## 2021-06-03 PROCEDURE — 25000003 PHARM REV CODE 250: Performed by: SURGERY

## 2021-06-03 PROCEDURE — 85007 BL SMEAR W/DIFF WBC COUNT: CPT | Performed by: SURGERY

## 2021-06-03 PROCEDURE — 94761 N-INVAS EAR/PLS OXIMETRY MLT: CPT

## 2021-06-03 PROCEDURE — 99222 1ST HOSP IP/OBS MODERATE 55: CPT | Mod: ,,, | Performed by: FAMILY MEDICINE

## 2021-06-03 PROCEDURE — 93010 EKG 12-LEAD: ICD-10-PCS | Mod: ,,, | Performed by: INTERNAL MEDICINE

## 2021-06-03 PROCEDURE — 63600175 PHARM REV CODE 636 W HCPCS: Performed by: SURGERY

## 2021-06-03 PROCEDURE — 96375 TX/PRO/DX INJ NEW DRUG ADDON: CPT

## 2021-06-03 PROCEDURE — 25000003 PHARM REV CODE 250: Performed by: FAMILY MEDICINE

## 2021-06-03 PROCEDURE — 27000221 HC OXYGEN, UP TO 24 HOURS

## 2021-06-03 PROCEDURE — 80053 COMPREHEN METABOLIC PANEL: CPT | Performed by: SURGERY

## 2021-06-03 PROCEDURE — 84484 ASSAY OF TROPONIN QUANT: CPT | Mod: 91 | Performed by: SURGERY

## 2021-06-03 PROCEDURE — 93010 ELECTROCARDIOGRAM REPORT: CPT | Mod: ,,, | Performed by: INTERNAL MEDICINE

## 2021-06-03 PROCEDURE — 36415 COLL VENOUS BLD VENIPUNCTURE: CPT | Performed by: SURGERY

## 2021-06-03 PROCEDURE — 93005 ELECTROCARDIOGRAM TRACING: CPT

## 2021-06-03 PROCEDURE — 99222 PR INITIAL HOSPITAL CARE,LEVL II: ICD-10-PCS | Mod: ,,, | Performed by: FAMILY MEDICINE

## 2021-06-03 PROCEDURE — 11000001 HC ACUTE MED/SURG PRIVATE ROOM

## 2021-06-03 PROCEDURE — 96372 THER/PROPH/DIAG INJ SC/IM: CPT | Mod: 59

## 2021-06-03 PROCEDURE — 94799 UNLISTED PULMONARY SVC/PX: CPT

## 2021-06-03 PROCEDURE — 85027 COMPLETE CBC AUTOMATED: CPT | Performed by: SURGERY

## 2021-06-03 RX ORDER — HYDROCHLOROTHIAZIDE 12.5 MG/1
12.5 TABLET ORAL DAILY
Status: DISCONTINUED | OUTPATIENT
Start: 2021-06-03 | End: 2021-06-07 | Stop reason: HOSPADM

## 2021-06-03 RX ADMIN — ENOXAPARIN SODIUM 50 MG: 60 INJECTION SUBCUTANEOUS at 07:06

## 2021-06-03 RX ADMIN — REMDESIVIR 200 MG: 100 INJECTION, POWDER, LYOPHILIZED, FOR SOLUTION INTRAVENOUS at 03:06

## 2021-06-03 RX ADMIN — FAMOTIDINE 20 MG: 20 TABLET, FILM COATED ORAL at 08:06

## 2021-06-03 RX ADMIN — DEXAMETHASONE SODIUM PHOSPHATE 6 MG: 4 INJECTION, SOLUTION INTRA-ARTICULAR; INTRALESIONAL; INTRAMUSCULAR; INTRAVENOUS; SOFT TISSUE at 08:06

## 2021-06-03 RX ADMIN — OXYCODONE HYDROCHLORIDE AND ACETAMINOPHEN 500 MG: 500 TABLET ORAL at 08:06

## 2021-06-03 RX ADMIN — AZITHROMYCIN MONOHYDRATE 500 MG: 500 INJECTION, POWDER, LYOPHILIZED, FOR SOLUTION INTRAVENOUS at 06:06

## 2021-06-03 RX ADMIN — ENOXAPARIN SODIUM 50 MG: 60 INJECTION SUBCUTANEOUS at 08:06

## 2021-06-03 RX ADMIN — MELATONIN 6 MG: 3 TAB ORAL at 08:06

## 2021-06-03 RX ADMIN — OFLOXACIN 50000 UNITS: 300 TABLET, COATED ORAL at 08:06

## 2021-06-03 RX ADMIN — MONTELUKAST 10 MG: 10 TABLET, FILM COATED ORAL at 08:06

## 2021-06-03 RX ADMIN — CEFTRIAXONE 1 G: 1 INJECTION, SOLUTION INTRAVENOUS at 07:06

## 2021-06-04 LAB
ALBUMIN SERPL BCP-MCNC: 2.3 G/DL (ref 3.5–5.2)
ALP SERPL-CCNC: 298 U/L (ref 55–135)
ALT SERPL W/O P-5'-P-CCNC: 96 U/L (ref 10–44)
ANION GAP SERPL CALC-SCNC: 9 MMOL/L (ref 8–16)
ANISOCYTOSIS BLD QL SMEAR: SLIGHT
AST SERPL-CCNC: 68 U/L (ref 10–40)
BASOPHILS # BLD AUTO: ABNORMAL K/UL (ref 0–0.2)
BASOPHILS NFR BLD: 0 % (ref 0–1.9)
BILIRUB SERPL-MCNC: 0.7 MG/DL (ref 0.1–1)
BUN SERPL-MCNC: 25 MG/DL (ref 8–23)
CALCIUM SERPL-MCNC: 8.6 MG/DL (ref 8.7–10.5)
CHLORIDE SERPL-SCNC: 103 MMOL/L (ref 95–110)
CO2 SERPL-SCNC: 27 MMOL/L (ref 23–29)
CREAT SERPL-MCNC: 0.8 MG/DL (ref 0.5–1.4)
DIFFERENTIAL METHOD: ABNORMAL
EOSINOPHIL # BLD AUTO: ABNORMAL K/UL (ref 0–0.5)
EOSINOPHIL NFR BLD: 0 % (ref 0–8)
ERYTHROCYTE [DISTWIDTH] IN BLOOD BY AUTOMATED COUNT: 12.6 % (ref 11.5–14.5)
EST. GFR  (AFRICAN AMERICAN): >60 ML/MIN/1.73 M^2
EST. GFR  (NON AFRICAN AMERICAN): >60 ML/MIN/1.73 M^2
GLUCOSE SERPL-MCNC: 153 MG/DL (ref 70–110)
HCT VFR BLD AUTO: 39.9 % (ref 40–54)
HGB BLD-MCNC: 13.3 G/DL (ref 14–18)
IMM GRANULOCYTES # BLD AUTO: ABNORMAL K/UL (ref 0–0.04)
IMM GRANULOCYTES NFR BLD AUTO: ABNORMAL % (ref 0–0.5)
LYMPHOCYTES # BLD AUTO: ABNORMAL K/UL (ref 1–4.8)
LYMPHOCYTES NFR BLD: 6 % (ref 18–48)
MCH RBC QN AUTO: 30.3 PG (ref 27–31)
MCHC RBC AUTO-ENTMCNC: 33.3 G/DL (ref 32–36)
MCV RBC AUTO: 91 FL (ref 82–98)
MONOCYTES # BLD AUTO: ABNORMAL K/UL (ref 0.3–1)
MONOCYTES NFR BLD: 10 % (ref 4–15)
NEUTROPHILS NFR BLD: 79 % (ref 38–73)
NEUTS BAND NFR BLD MANUAL: 5 %
NRBC BLD-RTO: 0 /100 WBC
PLATELET # BLD AUTO: 323 K/UL (ref 150–450)
PLATELET BLD QL SMEAR: ABNORMAL
PMV BLD AUTO: 9.2 FL (ref 9.2–12.9)
POTASSIUM SERPL-SCNC: 3.9 MMOL/L (ref 3.5–5.1)
PROT SERPL-MCNC: 6.4 G/DL (ref 6–8.4)
RBC # BLD AUTO: 4.39 M/UL (ref 4.6–6.2)
SODIUM SERPL-SCNC: 139 MMOL/L (ref 136–145)
WBC # BLD AUTO: 17.08 K/UL (ref 3.9–12.7)

## 2021-06-04 PROCEDURE — 36415 COLL VENOUS BLD VENIPUNCTURE: CPT | Performed by: FAMILY MEDICINE

## 2021-06-04 PROCEDURE — 85007 BL SMEAR W/DIFF WBC COUNT: CPT | Performed by: FAMILY MEDICINE

## 2021-06-04 PROCEDURE — 80053 COMPREHEN METABOLIC PANEL: CPT | Performed by: SURGERY

## 2021-06-04 PROCEDURE — 25000003 PHARM REV CODE 250: Performed by: SURGERY

## 2021-06-04 PROCEDURE — 63600175 PHARM REV CODE 636 W HCPCS: Performed by: SURGERY

## 2021-06-04 PROCEDURE — 94761 N-INVAS EAR/PLS OXIMETRY MLT: CPT

## 2021-06-04 PROCEDURE — 27000221 HC OXYGEN, UP TO 24 HOURS

## 2021-06-04 PROCEDURE — 11000001 HC ACUTE MED/SURG PRIVATE ROOM

## 2021-06-04 PROCEDURE — 99900035 HC TECH TIME PER 15 MIN (STAT)

## 2021-06-04 PROCEDURE — 99232 SBSQ HOSP IP/OBS MODERATE 35: CPT | Mod: ,,, | Performed by: FAMILY MEDICINE

## 2021-06-04 PROCEDURE — 25000003 PHARM REV CODE 250: Performed by: NURSE PRACTITIONER

## 2021-06-04 PROCEDURE — 99232 PR SUBSEQUENT HOSPITAL CARE,LEVL II: ICD-10-PCS | Mod: ,,, | Performed by: FAMILY MEDICINE

## 2021-06-04 PROCEDURE — 63700000 PHARM REV CODE 250 ALT 637 W/O HCPCS: Performed by: NURSE PRACTITIONER

## 2021-06-04 PROCEDURE — 25000003 PHARM REV CODE 250: Performed by: FAMILY MEDICINE

## 2021-06-04 PROCEDURE — 85027 COMPLETE CBC AUTOMATED: CPT | Performed by: FAMILY MEDICINE

## 2021-06-04 RX ORDER — AZITHROMYCIN 250 MG/1
250 TABLET, FILM COATED ORAL DAILY
Status: DISCONTINUED | OUTPATIENT
Start: 2021-06-04 | End: 2021-06-07 | Stop reason: HOSPADM

## 2021-06-04 RX ADMIN — FAMOTIDINE 20 MG: 20 TABLET, FILM COATED ORAL at 08:06

## 2021-06-04 RX ADMIN — ENOXAPARIN SODIUM 50 MG: 60 INJECTION SUBCUTANEOUS at 09:06

## 2021-06-04 RX ADMIN — MONTELUKAST 10 MG: 10 TABLET, FILM COATED ORAL at 08:06

## 2021-06-04 RX ADMIN — OXYCODONE HYDROCHLORIDE AND ACETAMINOPHEN 500 MG: 500 TABLET ORAL at 08:06

## 2021-06-04 RX ADMIN — OFLOXACIN 50000 UNITS: 300 TABLET, COATED ORAL at 08:06

## 2021-06-04 RX ADMIN — ENOXAPARIN SODIUM 50 MG: 60 INJECTION SUBCUTANEOUS at 08:06

## 2021-06-04 RX ADMIN — HYDROCHLOROTHIAZIDE 12.5 MG: 12.5 TABLET ORAL at 08:06

## 2021-06-04 RX ADMIN — DEXAMETHASONE SODIUM PHOSPHATE 6 MG: 4 INJECTION, SOLUTION INTRA-ARTICULAR; INTRALESIONAL; INTRAMUSCULAR; INTRAVENOUS; SOFT TISSUE at 08:06

## 2021-06-04 RX ADMIN — OXYCODONE HYDROCHLORIDE AND ACETAMINOPHEN 500 MG: 500 TABLET ORAL at 09:06

## 2021-06-04 RX ADMIN — REMDESIVIR 100 MG: 100 INJECTION, POWDER, LYOPHILIZED, FOR SOLUTION INTRAVENOUS at 03:06

## 2021-06-04 RX ADMIN — MELATONIN 6 MG: 3 TAB ORAL at 09:06

## 2021-06-04 RX ADMIN — FAMOTIDINE 20 MG: 20 TABLET, FILM COATED ORAL at 09:06

## 2021-06-04 RX ADMIN — AZITHROMYCIN MONOHYDRATE 250 MG: 250 TABLET ORAL at 06:06

## 2021-06-05 LAB
ALBUMIN SERPL BCP-MCNC: 2.3 G/DL (ref 3.5–5.2)
ALP SERPL-CCNC: 348 U/L (ref 55–135)
ALT SERPL W/O P-5'-P-CCNC: 132 U/L (ref 10–44)
ANION GAP SERPL CALC-SCNC: 7 MMOL/L (ref 8–16)
ANISOCYTOSIS BLD QL SMEAR: SLIGHT
AST SERPL-CCNC: 86 U/L (ref 10–40)
BASOPHILS # BLD AUTO: ABNORMAL K/UL (ref 0–0.2)
BASOPHILS NFR BLD: 2 % (ref 0–1.9)
BILIRUB SERPL-MCNC: 0.6 MG/DL (ref 0.1–1)
BUN SERPL-MCNC: 31 MG/DL (ref 8–23)
CALCIUM SERPL-MCNC: 8.7 MG/DL (ref 8.7–10.5)
CHLORIDE SERPL-SCNC: 105 MMOL/L (ref 95–110)
CO2 SERPL-SCNC: 26 MMOL/L (ref 23–29)
CREAT SERPL-MCNC: 0.8 MG/DL (ref 0.5–1.4)
DACRYOCYTES BLD QL SMEAR: ABNORMAL
DIFFERENTIAL METHOD: ABNORMAL
EOSINOPHIL # BLD AUTO: ABNORMAL K/UL (ref 0–0.5)
EOSINOPHIL NFR BLD: 1 % (ref 0–8)
ERYTHROCYTE [DISTWIDTH] IN BLOOD BY AUTOMATED COUNT: 12.5 % (ref 11.5–14.5)
EST. GFR  (AFRICAN AMERICAN): >60 ML/MIN/1.73 M^2
EST. GFR  (NON AFRICAN AMERICAN): >60 ML/MIN/1.73 M^2
GLUCOSE SERPL-MCNC: 147 MG/DL (ref 70–110)
HCT VFR BLD AUTO: 37.6 % (ref 40–54)
HGB BLD-MCNC: 12.8 G/DL (ref 14–18)
IMM GRANULOCYTES # BLD AUTO: ABNORMAL K/UL (ref 0–0.04)
IMM GRANULOCYTES NFR BLD AUTO: ABNORMAL % (ref 0–0.5)
LYMPHOCYTES # BLD AUTO: ABNORMAL K/UL (ref 1–4.8)
LYMPHOCYTES NFR BLD: 7 % (ref 18–48)
MCH RBC QN AUTO: 30.8 PG (ref 27–31)
MCHC RBC AUTO-ENTMCNC: 34 G/DL (ref 32–36)
MCV RBC AUTO: 90 FL (ref 82–98)
MONOCYTES # BLD AUTO: ABNORMAL K/UL (ref 0.3–1)
MONOCYTES NFR BLD: 5 % (ref 4–15)
NEUTROPHILS NFR BLD: 83 % (ref 38–73)
NEUTS BAND NFR BLD MANUAL: 2 %
NRBC BLD-RTO: 0 /100 WBC
PLATELET # BLD AUTO: 373 K/UL (ref 150–450)
PLATELET BLD QL SMEAR: ABNORMAL
PMV BLD AUTO: 9 FL (ref 9.2–12.9)
POIKILOCYTOSIS BLD QL SMEAR: SLIGHT
POTASSIUM SERPL-SCNC: 4.1 MMOL/L (ref 3.5–5.1)
PROT SERPL-MCNC: 6.3 G/DL (ref 6–8.4)
RBC # BLD AUTO: 4.16 M/UL (ref 4.6–6.2)
SODIUM SERPL-SCNC: 138 MMOL/L (ref 136–145)
WBC # BLD AUTO: 15.1 K/UL (ref 3.9–12.7)

## 2021-06-05 PROCEDURE — 99233 PR SUBSEQUENT HOSPITAL CARE,LEVL III: ICD-10-PCS | Mod: ,,, | Performed by: FAMILY MEDICINE

## 2021-06-05 PROCEDURE — 27000221 HC OXYGEN, UP TO 24 HOURS

## 2021-06-05 PROCEDURE — 94761 N-INVAS EAR/PLS OXIMETRY MLT: CPT

## 2021-06-05 PROCEDURE — 85027 COMPLETE CBC AUTOMATED: CPT | Performed by: FAMILY MEDICINE

## 2021-06-05 PROCEDURE — 25000003 PHARM REV CODE 250: Performed by: FAMILY MEDICINE

## 2021-06-05 PROCEDURE — 36415 COLL VENOUS BLD VENIPUNCTURE: CPT | Performed by: FAMILY MEDICINE

## 2021-06-05 PROCEDURE — 25000003 PHARM REV CODE 250: Performed by: NURSE PRACTITIONER

## 2021-06-05 PROCEDURE — 11000001 HC ACUTE MED/SURG PRIVATE ROOM

## 2021-06-05 PROCEDURE — 99900035 HC TECH TIME PER 15 MIN (STAT)

## 2021-06-05 PROCEDURE — 63600175 PHARM REV CODE 636 W HCPCS: Performed by: SURGERY

## 2021-06-05 PROCEDURE — 80053 COMPREHEN METABOLIC PANEL: CPT | Performed by: SURGERY

## 2021-06-05 PROCEDURE — 85007 BL SMEAR W/DIFF WBC COUNT: CPT | Performed by: FAMILY MEDICINE

## 2021-06-05 PROCEDURE — 99233 SBSQ HOSP IP/OBS HIGH 50: CPT | Mod: ,,, | Performed by: FAMILY MEDICINE

## 2021-06-05 PROCEDURE — 63700000 PHARM REV CODE 250 ALT 637 W/O HCPCS: Performed by: NURSE PRACTITIONER

## 2021-06-05 PROCEDURE — 94760 N-INVAS EAR/PLS OXIMETRY 1: CPT

## 2021-06-05 PROCEDURE — 25000003 PHARM REV CODE 250: Performed by: SURGERY

## 2021-06-05 RX ADMIN — OXYCODONE HYDROCHLORIDE AND ACETAMINOPHEN 500 MG: 500 TABLET ORAL at 08:06

## 2021-06-05 RX ADMIN — AZITHROMYCIN MONOHYDRATE 250 MG: 250 TABLET ORAL at 06:06

## 2021-06-05 RX ADMIN — ENOXAPARIN SODIUM 50 MG: 60 INJECTION SUBCUTANEOUS at 08:06

## 2021-06-05 RX ADMIN — HYDROCHLOROTHIAZIDE 12.5 MG: 12.5 TABLET ORAL at 10:06

## 2021-06-05 RX ADMIN — MELATONIN 6 MG: 3 TAB ORAL at 08:06

## 2021-06-05 RX ADMIN — REMDESIVIR 100 MG: 100 INJECTION, POWDER, LYOPHILIZED, FOR SOLUTION INTRAVENOUS at 02:06

## 2021-06-05 RX ADMIN — MONTELUKAST 10 MG: 10 TABLET, FILM COATED ORAL at 10:06

## 2021-06-05 RX ADMIN — OXYCODONE HYDROCHLORIDE AND ACETAMINOPHEN 500 MG: 500 TABLET ORAL at 10:06

## 2021-06-05 RX ADMIN — FAMOTIDINE 20 MG: 20 TABLET, FILM COATED ORAL at 08:06

## 2021-06-05 RX ADMIN — ENOXAPARIN SODIUM 50 MG: 60 INJECTION SUBCUTANEOUS at 09:06

## 2021-06-05 RX ADMIN — OFLOXACIN 50000 UNITS: 300 TABLET, COATED ORAL at 10:06

## 2021-06-05 RX ADMIN — FAMOTIDINE 20 MG: 20 TABLET, FILM COATED ORAL at 10:06

## 2021-06-05 RX ADMIN — DEXAMETHASONE SODIUM PHOSPHATE 6 MG: 4 INJECTION, SOLUTION INTRA-ARTICULAR; INTRALESIONAL; INTRAMUSCULAR; INTRAVENOUS; SOFT TISSUE at 10:06

## 2021-06-06 LAB
ALBUMIN SERPL BCP-MCNC: 2.5 G/DL (ref 3.5–5.2)
ALP SERPL-CCNC: 327 U/L (ref 55–135)
ALT SERPL W/O P-5'-P-CCNC: 176 U/L (ref 10–44)
ANION GAP SERPL CALC-SCNC: 12 MMOL/L (ref 8–16)
ANISOCYTOSIS BLD QL SMEAR: SLIGHT
AST SERPL-CCNC: 87 U/L (ref 10–40)
BASOPHILS # BLD AUTO: ABNORMAL K/UL (ref 0–0.2)
BASOPHILS NFR BLD: 0 % (ref 0–1.9)
BILIRUB SERPL-MCNC: 0.7 MG/DL (ref 0.1–1)
BUN SERPL-MCNC: 32 MG/DL (ref 8–23)
CALCIUM SERPL-MCNC: 9.1 MG/DL (ref 8.7–10.5)
CHLORIDE SERPL-SCNC: 105 MMOL/L (ref 95–110)
CO2 SERPL-SCNC: 23 MMOL/L (ref 23–29)
CREAT SERPL-MCNC: 0.8 MG/DL (ref 0.5–1.4)
DIFFERENTIAL METHOD: ABNORMAL
EOSINOPHIL # BLD AUTO: ABNORMAL K/UL (ref 0–0.5)
EOSINOPHIL NFR BLD: 0 % (ref 0–8)
ERYTHROCYTE [DISTWIDTH] IN BLOOD BY AUTOMATED COUNT: 12.3 % (ref 11.5–14.5)
EST. GFR  (AFRICAN AMERICAN): >60 ML/MIN/1.73 M^2
EST. GFR  (NON AFRICAN AMERICAN): >60 ML/MIN/1.73 M^2
GLUCOSE SERPL-MCNC: 126 MG/DL (ref 70–110)
HCT VFR BLD AUTO: 38.7 % (ref 40–54)
HGB BLD-MCNC: 13.1 G/DL (ref 14–18)
IMM GRANULOCYTES # BLD AUTO: ABNORMAL K/UL (ref 0–0.04)
IMM GRANULOCYTES NFR BLD AUTO: ABNORMAL % (ref 0–0.5)
LYMPHOCYTES # BLD AUTO: ABNORMAL K/UL (ref 1–4.8)
LYMPHOCYTES NFR BLD: 5 % (ref 18–48)
MCH RBC QN AUTO: 30.2 PG (ref 27–31)
MCHC RBC AUTO-ENTMCNC: 33.9 G/DL (ref 32–36)
MCV RBC AUTO: 89 FL (ref 82–98)
METAMYELOCYTES NFR BLD MANUAL: 1 %
MONOCYTES # BLD AUTO: ABNORMAL K/UL (ref 0.3–1)
MONOCYTES NFR BLD: 7 % (ref 4–15)
MYELOCYTES NFR BLD MANUAL: 1 %
NEUTROPHILS NFR BLD: 82 % (ref 38–73)
NEUTS BAND NFR BLD MANUAL: 4 %
NRBC BLD-RTO: 0 /100 WBC
PLATELET # BLD AUTO: 400 K/UL (ref 150–450)
PLATELET BLD QL SMEAR: ABNORMAL
PMV BLD AUTO: 9 FL (ref 9.2–12.9)
POIKILOCYTOSIS BLD QL SMEAR: SLIGHT
POTASSIUM SERPL-SCNC: 4.3 MMOL/L (ref 3.5–5.1)
PROT SERPL-MCNC: 6.5 G/DL (ref 6–8.4)
RBC # BLD AUTO: 4.34 M/UL (ref 4.6–6.2)
SODIUM SERPL-SCNC: 140 MMOL/L (ref 136–145)
WBC # BLD AUTO: 11.66 K/UL (ref 3.9–12.7)

## 2021-06-06 PROCEDURE — 25000003 PHARM REV CODE 250: Performed by: SURGERY

## 2021-06-06 PROCEDURE — 63600175 PHARM REV CODE 636 W HCPCS: Performed by: SURGERY

## 2021-06-06 PROCEDURE — 25000003 PHARM REV CODE 250: Performed by: FAMILY MEDICINE

## 2021-06-06 PROCEDURE — 85007 BL SMEAR W/DIFF WBC COUNT: CPT | Performed by: FAMILY MEDICINE

## 2021-06-06 PROCEDURE — 85027 COMPLETE CBC AUTOMATED: CPT | Performed by: FAMILY MEDICINE

## 2021-06-06 PROCEDURE — 63700000 PHARM REV CODE 250 ALT 637 W/O HCPCS: Performed by: NURSE PRACTITIONER

## 2021-06-06 PROCEDURE — 94761 N-INVAS EAR/PLS OXIMETRY MLT: CPT

## 2021-06-06 PROCEDURE — 99232 SBSQ HOSP IP/OBS MODERATE 35: CPT | Mod: ,,, | Performed by: FAMILY MEDICINE

## 2021-06-06 PROCEDURE — 36415 COLL VENOUS BLD VENIPUNCTURE: CPT | Performed by: FAMILY MEDICINE

## 2021-06-06 PROCEDURE — 25000003 PHARM REV CODE 250: Performed by: NURSE PRACTITIONER

## 2021-06-06 PROCEDURE — 27000221 HC OXYGEN, UP TO 24 HOURS

## 2021-06-06 PROCEDURE — 99900035 HC TECH TIME PER 15 MIN (STAT)

## 2021-06-06 PROCEDURE — 99232 PR SUBSEQUENT HOSPITAL CARE,LEVL II: ICD-10-PCS | Mod: ,,, | Performed by: FAMILY MEDICINE

## 2021-06-06 PROCEDURE — 80053 COMPREHEN METABOLIC PANEL: CPT | Performed by: SURGERY

## 2021-06-06 PROCEDURE — 11000001 HC ACUTE MED/SURG PRIVATE ROOM

## 2021-06-06 RX ADMIN — ENOXAPARIN SODIUM 50 MG: 60 INJECTION SUBCUTANEOUS at 08:06

## 2021-06-06 RX ADMIN — AZITHROMYCIN MONOHYDRATE 250 MG: 250 TABLET ORAL at 05:06

## 2021-06-06 RX ADMIN — ENOXAPARIN SODIUM 50 MG: 60 INJECTION SUBCUTANEOUS at 09:06

## 2021-06-06 RX ADMIN — OXYCODONE HYDROCHLORIDE AND ACETAMINOPHEN 500 MG: 500 TABLET ORAL at 08:06

## 2021-06-06 RX ADMIN — FAMOTIDINE 20 MG: 20 TABLET, FILM COATED ORAL at 08:06

## 2021-06-06 RX ADMIN — MELATONIN 6 MG: 3 TAB ORAL at 08:06

## 2021-06-06 RX ADMIN — DEXAMETHASONE SODIUM PHOSPHATE 6 MG: 4 INJECTION, SOLUTION INTRA-ARTICULAR; INTRALESIONAL; INTRAMUSCULAR; INTRAVENOUS; SOFT TISSUE at 09:06

## 2021-06-06 RX ADMIN — MONTELUKAST 10 MG: 10 TABLET, FILM COATED ORAL at 09:06

## 2021-06-06 RX ADMIN — OFLOXACIN 50000 UNITS: 300 TABLET, COATED ORAL at 09:06

## 2021-06-06 RX ADMIN — FAMOTIDINE 20 MG: 20 TABLET, FILM COATED ORAL at 09:06

## 2021-06-06 RX ADMIN — REMDESIVIR 100 MG: 100 INJECTION, POWDER, LYOPHILIZED, FOR SOLUTION INTRAVENOUS at 05:06

## 2021-06-06 RX ADMIN — HYDROCHLOROTHIAZIDE 12.5 MG: 12.5 TABLET ORAL at 09:06

## 2021-06-06 RX ADMIN — OXYCODONE HYDROCHLORIDE AND ACETAMINOPHEN 500 MG: 500 TABLET ORAL at 09:06

## 2021-06-07 ENCOUNTER — PATIENT OUTREACH (OUTPATIENT)
Dept: INFECTIOUS DISEASES | Facility: HOSPITAL | Age: 66
End: 2021-06-07

## 2021-06-07 VITALS
WEIGHT: 244.5 LBS | RESPIRATION RATE: 19 BRPM | BODY MASS INDEX: 31.38 KG/M2 | TEMPERATURE: 98 F | HEART RATE: 58 BPM | OXYGEN SATURATION: 97 % | HEIGHT: 74 IN | SYSTOLIC BLOOD PRESSURE: 118 MMHG | DIASTOLIC BLOOD PRESSURE: 60 MMHG

## 2021-06-07 PROBLEM — R74.8 ABNORMAL LIVER ENZYMES: Status: ACTIVE | Noted: 2021-06-07

## 2021-06-07 LAB
ALBUMIN SERPL BCP-MCNC: 2.6 G/DL (ref 3.5–5.2)
ALP SERPL-CCNC: 305 U/L (ref 55–135)
ALT SERPL W/O P-5'-P-CCNC: 177 U/L (ref 10–44)
ANION GAP SERPL CALC-SCNC: 11 MMOL/L (ref 8–16)
ANISOCYTOSIS BLD QL SMEAR: SLIGHT
AST SERPL-CCNC: 59 U/L (ref 10–40)
BASOPHILS # BLD AUTO: ABNORMAL K/UL (ref 0–0.2)
BASOPHILS NFR BLD: 0 % (ref 0–1.9)
BILIRUB SERPL-MCNC: 0.7 MG/DL (ref 0.1–1)
BUN SERPL-MCNC: 28 MG/DL (ref 8–23)
CALCIUM SERPL-MCNC: 9.5 MG/DL (ref 8.7–10.5)
CHLORIDE SERPL-SCNC: 104 MMOL/L (ref 95–110)
CO2 SERPL-SCNC: 25 MMOL/L (ref 23–29)
CREAT SERPL-MCNC: 0.9 MG/DL (ref 0.5–1.4)
DIFFERENTIAL METHOD: ABNORMAL
EOSINOPHIL # BLD AUTO: ABNORMAL K/UL (ref 0–0.5)
EOSINOPHIL NFR BLD: 0 % (ref 0–8)
ERYTHROCYTE [DISTWIDTH] IN BLOOD BY AUTOMATED COUNT: 12.4 % (ref 11.5–14.5)
EST. GFR  (AFRICAN AMERICAN): >60 ML/MIN/1.73 M^2
EST. GFR  (NON AFRICAN AMERICAN): >60 ML/MIN/1.73 M^2
GLUCOSE SERPL-MCNC: 132 MG/DL (ref 70–110)
HCT VFR BLD AUTO: 40.4 % (ref 40–54)
HGB BLD-MCNC: 13.8 G/DL (ref 14–18)
IMM GRANULOCYTES # BLD AUTO: ABNORMAL K/UL (ref 0–0.04)
IMM GRANULOCYTES NFR BLD AUTO: ABNORMAL % (ref 0–0.5)
LYMPHOCYTES # BLD AUTO: ABNORMAL K/UL (ref 1–4.8)
LYMPHOCYTES NFR BLD: 11 % (ref 18–48)
MCH RBC QN AUTO: 30.7 PG (ref 27–31)
MCHC RBC AUTO-ENTMCNC: 34.2 G/DL (ref 32–36)
MCV RBC AUTO: 90 FL (ref 82–98)
METAMYELOCYTES NFR BLD MANUAL: 1 %
MONOCYTES # BLD AUTO: ABNORMAL K/UL (ref 0.3–1)
MONOCYTES NFR BLD: 3 % (ref 4–15)
MYELOCYTES NFR BLD MANUAL: 1 %
NEUTROPHILS NFR BLD: 81 % (ref 38–73)
NEUTS BAND NFR BLD MANUAL: 3 %
NRBC BLD-RTO: 0 /100 WBC
PLATELET # BLD AUTO: 410 K/UL (ref 150–450)
PLATELET BLD QL SMEAR: ABNORMAL
PMV BLD AUTO: 9.1 FL (ref 9.2–12.9)
POIKILOCYTOSIS BLD QL SMEAR: SLIGHT
POTASSIUM SERPL-SCNC: 4.8 MMOL/L (ref 3.5–5.1)
PROT SERPL-MCNC: 6.5 G/DL (ref 6–8.4)
RBC # BLD AUTO: 4.49 M/UL (ref 4.6–6.2)
SODIUM SERPL-SCNC: 140 MMOL/L (ref 136–145)
WBC # BLD AUTO: 11.28 K/UL (ref 3.9–12.7)

## 2021-06-07 PROCEDURE — 36415 COLL VENOUS BLD VENIPUNCTURE: CPT | Performed by: FAMILY MEDICINE

## 2021-06-07 PROCEDURE — 99239 PR HOSPITAL DISCHARGE DAY,>30 MIN: ICD-10-PCS | Mod: ,,, | Performed by: INTERNAL MEDICINE

## 2021-06-07 PROCEDURE — 25000003 PHARM REV CODE 250: Performed by: NURSE PRACTITIONER

## 2021-06-07 PROCEDURE — 99239 HOSP IP/OBS DSCHRG MGMT >30: CPT | Mod: ,,, | Performed by: INTERNAL MEDICINE

## 2021-06-07 PROCEDURE — 85027 COMPLETE CBC AUTOMATED: CPT | Performed by: FAMILY MEDICINE

## 2021-06-07 PROCEDURE — 80053 COMPREHEN METABOLIC PANEL: CPT | Performed by: SURGERY

## 2021-06-07 PROCEDURE — 25000003 PHARM REV CODE 250: Performed by: FAMILY MEDICINE

## 2021-06-07 PROCEDURE — 85007 BL SMEAR W/DIFF WBC COUNT: CPT | Performed by: FAMILY MEDICINE

## 2021-06-07 PROCEDURE — 25000003 PHARM REV CODE 250: Performed by: SURGERY

## 2021-06-07 PROCEDURE — 94760 N-INVAS EAR/PLS OXIMETRY 1: CPT

## 2021-06-07 PROCEDURE — 99900035 HC TECH TIME PER 15 MIN (STAT)

## 2021-06-07 PROCEDURE — 63600175 PHARM REV CODE 636 W HCPCS: Performed by: SURGERY

## 2021-06-07 RX ORDER — ASPIRIN 81 MG/1
81 TABLET ORAL DAILY
Qty: 30 TABLET | Refills: 0 | Status: SHIPPED | OUTPATIENT
Start: 2021-06-07

## 2021-06-07 RX ORDER — ASCORBIC ACID 500 MG
500 TABLET ORAL 2 TIMES DAILY
Qty: 60 TABLET | Refills: 0 | Status: SHIPPED | OUTPATIENT
Start: 2021-06-07

## 2021-06-07 RX ORDER — ALBUTEROL SULFATE 90 UG/1
2 AEROSOL, METERED RESPIRATORY (INHALATION) EVERY 6 HOURS PRN
Qty: 8.5 G | Refills: 0 | Status: SHIPPED | OUTPATIENT
Start: 2021-06-07 | End: 2021-09-24

## 2021-06-07 RX ORDER — ASPIRIN 81 MG/1
81 TABLET ORAL DAILY
Status: DISCONTINUED | OUTPATIENT
Start: 2021-06-07 | End: 2021-06-07 | Stop reason: HOSPADM

## 2021-06-07 RX ADMIN — DEXAMETHASONE SODIUM PHOSPHATE 6 MG: 4 INJECTION, SOLUTION INTRA-ARTICULAR; INTRALESIONAL; INTRAMUSCULAR; INTRAVENOUS; SOFT TISSUE at 08:06

## 2021-06-07 RX ADMIN — ENOXAPARIN SODIUM 50 MG: 60 INJECTION SUBCUTANEOUS at 08:06

## 2021-06-07 RX ADMIN — HYDROCHLOROTHIAZIDE 12.5 MG: 12.5 TABLET ORAL at 08:06

## 2021-06-07 RX ADMIN — REMDESIVIR 100 MG: 100 INJECTION, POWDER, LYOPHILIZED, FOR SOLUTION INTRAVENOUS at 10:06

## 2021-06-07 RX ADMIN — ASPIRIN 81 MG: 81 TABLET, COATED ORAL at 10:06

## 2021-06-07 RX ADMIN — FAMOTIDINE 20 MG: 20 TABLET, FILM COATED ORAL at 08:06

## 2021-06-07 RX ADMIN — OFLOXACIN 50000 UNITS: 300 TABLET, COATED ORAL at 08:06

## 2021-06-07 RX ADMIN — OXYCODONE HYDROCHLORIDE AND ACETAMINOPHEN 500 MG: 500 TABLET ORAL at 08:06

## 2021-06-07 RX ADMIN — MONTELUKAST 10 MG: 10 TABLET, FILM COATED ORAL at 08:06

## 2021-06-08 ENCOUNTER — NURSE TRIAGE (OUTPATIENT)
Dept: ADMINISTRATIVE | Facility: CLINIC | Age: 66
End: 2021-06-08

## 2021-06-08 LAB
BACTERIA BLD CULT: NORMAL
BACTERIA BLD CULT: NORMAL

## 2021-06-09 ENCOUNTER — NURSE TRIAGE (OUTPATIENT)
Dept: ADMINISTRATIVE | Facility: CLINIC | Age: 66
End: 2021-06-09

## 2021-06-14 ENCOUNTER — OFFICE VISIT (OUTPATIENT)
Dept: FAMILY MEDICINE | Facility: CLINIC | Age: 66
End: 2021-06-14
Payer: COMMERCIAL

## 2021-06-14 ENCOUNTER — TELEPHONE (OUTPATIENT)
Dept: FAMILY MEDICINE | Facility: CLINIC | Age: 66
End: 2021-06-14

## 2021-06-14 VITALS
OXYGEN SATURATION: 98 % | SYSTOLIC BLOOD PRESSURE: 119 MMHG | HEART RATE: 70 BPM | RESPIRATION RATE: 16 BRPM | DIASTOLIC BLOOD PRESSURE: 76 MMHG | WEIGHT: 235.56 LBS | HEIGHT: 74 IN | BODY MASS INDEX: 30.23 KG/M2

## 2021-06-14 DIAGNOSIS — J12.82 PNEUMONIA DUE TO COVID-19 VIRUS: Primary | ICD-10-CM

## 2021-06-14 DIAGNOSIS — I10 ESSENTIAL HYPERTENSION: ICD-10-CM

## 2021-06-14 DIAGNOSIS — U07.1 PNEUMONIA DUE TO COVID-19 VIRUS: Primary | ICD-10-CM

## 2021-06-14 PROCEDURE — 3008F BODY MASS INDEX DOCD: CPT | Mod: CPTII,S$GLB,, | Performed by: FAMILY MEDICINE

## 2021-06-14 PROCEDURE — 3008F PR BODY MASS INDEX (BMI) DOCUMENTED: ICD-10-PCS | Mod: CPTII,S$GLB,, | Performed by: FAMILY MEDICINE

## 2021-06-14 PROCEDURE — 1126F PR PAIN SEVERITY QUANTIFIED, NO PAIN PRESENT: ICD-10-PCS | Mod: S$GLB,,, | Performed by: FAMILY MEDICINE

## 2021-06-14 PROCEDURE — 1126F AMNT PAIN NOTED NONE PRSNT: CPT | Mod: S$GLB,,, | Performed by: FAMILY MEDICINE

## 2021-06-14 PROCEDURE — 99495 TCM SERVICES (MODERATE COMPLEXITY): ICD-10-PCS | Mod: S$GLB,,, | Performed by: FAMILY MEDICINE

## 2021-06-14 PROCEDURE — 3288F FALL RISK ASSESSMENT DOCD: CPT | Mod: CPTII,S$GLB,, | Performed by: FAMILY MEDICINE

## 2021-06-14 PROCEDURE — 99999 PR PBB SHADOW E&M-EST. PATIENT-LVL III: ICD-10-PCS | Mod: PBBFAC,,, | Performed by: FAMILY MEDICINE

## 2021-06-14 PROCEDURE — 99999 PR PBB SHADOW E&M-EST. PATIENT-LVL III: CPT | Mod: PBBFAC,,, | Performed by: FAMILY MEDICINE

## 2021-06-14 PROCEDURE — 1101F PR PT FALLS ASSESS DOC 0-1 FALLS W/OUT INJ PAST YR: ICD-10-PCS | Mod: CPTII,S$GLB,, | Performed by: FAMILY MEDICINE

## 2021-06-14 PROCEDURE — 1101F PT FALLS ASSESS-DOCD LE1/YR: CPT | Mod: CPTII,S$GLB,, | Performed by: FAMILY MEDICINE

## 2021-06-14 PROCEDURE — 3288F PR FALLS RISK ASSESSMENT DOCUMENTED: ICD-10-PCS | Mod: CPTII,S$GLB,, | Performed by: FAMILY MEDICINE

## 2021-06-14 PROCEDURE — 99495 TRANSJ CARE MGMT MOD F2F 14D: CPT | Mod: S$GLB,,, | Performed by: FAMILY MEDICINE

## 2021-06-25 ENCOUNTER — OFFICE VISIT (OUTPATIENT)
Dept: INTERNAL MEDICINE | Facility: CLINIC | Age: 66
End: 2021-06-25
Payer: COMMERCIAL

## 2021-06-25 VITALS
OXYGEN SATURATION: 99 % | DIASTOLIC BLOOD PRESSURE: 72 MMHG | BODY MASS INDEX: 30.16 KG/M2 | HEART RATE: 69 BPM | WEIGHT: 235 LBS | HEIGHT: 74 IN | SYSTOLIC BLOOD PRESSURE: 118 MMHG

## 2021-06-25 DIAGNOSIS — Z12.11 COLON CANCER SCREENING: ICD-10-CM

## 2021-06-25 DIAGNOSIS — U07.1 COVID-19 VIRUS INFECTION: ICD-10-CM

## 2021-06-25 DIAGNOSIS — Z53.20 PROSTATE CANCER SCREENING DECLINED: ICD-10-CM

## 2021-06-25 DIAGNOSIS — E78.5 HYPERLIPIDEMIA, UNSPECIFIED HYPERLIPIDEMIA TYPE: ICD-10-CM

## 2021-06-25 DIAGNOSIS — Z00.00 ANNUAL PHYSICAL EXAM: Primary | ICD-10-CM

## 2021-06-25 DIAGNOSIS — I10 ESSENTIAL HYPERTENSION: ICD-10-CM

## 2021-06-25 PROCEDURE — 1126F AMNT PAIN NOTED NONE PRSNT: CPT | Mod: S$GLB,,, | Performed by: INTERNAL MEDICINE

## 2021-06-25 PROCEDURE — 1126F PR PAIN SEVERITY QUANTIFIED, NO PAIN PRESENT: ICD-10-PCS | Mod: S$GLB,,, | Performed by: INTERNAL MEDICINE

## 2021-06-25 PROCEDURE — 99999 PR PBB SHADOW E&M-EST. PATIENT-LVL III: CPT | Mod: PBBFAC,,, | Performed by: INTERNAL MEDICINE

## 2021-06-25 PROCEDURE — 3008F BODY MASS INDEX DOCD: CPT | Mod: CPTII,S$GLB,, | Performed by: INTERNAL MEDICINE

## 2021-06-25 PROCEDURE — 99999 PR PBB SHADOW E&M-EST. PATIENT-LVL III: ICD-10-PCS | Mod: PBBFAC,,, | Performed by: INTERNAL MEDICINE

## 2021-06-25 PROCEDURE — 99397 PR PREVENTIVE VISIT,EST,65 & OVER: ICD-10-PCS | Mod: S$GLB,,, | Performed by: INTERNAL MEDICINE

## 2021-06-25 PROCEDURE — 3008F PR BODY MASS INDEX (BMI) DOCUMENTED: ICD-10-PCS | Mod: CPTII,S$GLB,, | Performed by: INTERNAL MEDICINE

## 2021-06-25 PROCEDURE — 1101F PR PT FALLS ASSESS DOC 0-1 FALLS W/OUT INJ PAST YR: ICD-10-PCS | Mod: CPTII,S$GLB,, | Performed by: INTERNAL MEDICINE

## 2021-06-25 PROCEDURE — 99397 PER PM REEVAL EST PAT 65+ YR: CPT | Mod: S$GLB,,, | Performed by: INTERNAL MEDICINE

## 2021-06-25 PROCEDURE — 3288F FALL RISK ASSESSMENT DOCD: CPT | Mod: CPTII,S$GLB,, | Performed by: INTERNAL MEDICINE

## 2021-06-25 PROCEDURE — 3288F PR FALLS RISK ASSESSMENT DOCUMENTED: ICD-10-PCS | Mod: CPTII,S$GLB,, | Performed by: INTERNAL MEDICINE

## 2021-06-25 PROCEDURE — 1101F PT FALLS ASSESS-DOCD LE1/YR: CPT | Mod: CPTII,S$GLB,, | Performed by: INTERNAL MEDICINE

## 2021-06-28 ENCOUNTER — LAB VISIT (OUTPATIENT)
Dept: LAB | Facility: HOSPITAL | Age: 66
End: 2021-06-28
Attending: INTERNAL MEDICINE
Payer: COMMERCIAL

## 2021-06-28 DIAGNOSIS — Z00.00 ANNUAL PHYSICAL EXAM: ICD-10-CM

## 2021-06-28 DIAGNOSIS — E78.5 HYPERLIPIDEMIA, UNSPECIFIED HYPERLIPIDEMIA TYPE: ICD-10-CM

## 2021-06-28 DIAGNOSIS — Z53.20 PROSTATE CANCER SCREENING DECLINED: ICD-10-CM

## 2021-06-28 DIAGNOSIS — I10 ESSENTIAL HYPERTENSION: ICD-10-CM

## 2021-06-28 LAB
ALBUMIN SERPL BCP-MCNC: 3.6 G/DL (ref 3.5–5.2)
ALP SERPL-CCNC: 110 U/L (ref 55–135)
ALT SERPL W/O P-5'-P-CCNC: 52 U/L (ref 10–44)
ANION GAP SERPL CALC-SCNC: 8 MMOL/L (ref 8–16)
AST SERPL-CCNC: 25 U/L (ref 10–40)
BASOPHILS # BLD AUTO: 0.03 K/UL (ref 0–0.2)
BASOPHILS NFR BLD: 0.6 % (ref 0–1.9)
BILIRUB SERPL-MCNC: 0.5 MG/DL (ref 0.1–1)
BUN SERPL-MCNC: 16 MG/DL (ref 8–23)
CALCIUM SERPL-MCNC: 9.8 MG/DL (ref 8.7–10.5)
CHLORIDE SERPL-SCNC: 107 MMOL/L (ref 95–110)
CHOLEST SERPL-MCNC: 229 MG/DL (ref 120–199)
CHOLEST/HDLC SERPL: 3.9 {RATIO} (ref 2–5)
CO2 SERPL-SCNC: 26 MMOL/L (ref 23–29)
COMPLEXED PSA SERPL-MCNC: 1.2 NG/ML (ref 0–4)
CREAT SERPL-MCNC: 0.9 MG/DL (ref 0.5–1.4)
DIFFERENTIAL METHOD: ABNORMAL
EOSINOPHIL # BLD AUTO: 0.2 K/UL (ref 0–0.5)
EOSINOPHIL NFR BLD: 4.3 % (ref 0–8)
ERYTHROCYTE [DISTWIDTH] IN BLOOD BY AUTOMATED COUNT: 13.5 % (ref 11.5–14.5)
EST. GFR  (AFRICAN AMERICAN): >60 ML/MIN/1.73 M^2
EST. GFR  (NON AFRICAN AMERICAN): >60 ML/MIN/1.73 M^2
ESTIMATED AVG GLUCOSE: 137 MG/DL (ref 68–131)
GLUCOSE SERPL-MCNC: 102 MG/DL (ref 70–110)
HBA1C MFR BLD: 6.4 % (ref 4–5.6)
HCT VFR BLD AUTO: 40.1 % (ref 40–54)
HDLC SERPL-MCNC: 59 MG/DL (ref 40–75)
HDLC SERPL: 25.8 % (ref 20–50)
HGB BLD-MCNC: 13.3 G/DL (ref 14–18)
IMM GRANULOCYTES # BLD AUTO: 0.04 K/UL (ref 0–0.04)
IMM GRANULOCYTES NFR BLD AUTO: 0.8 % (ref 0–0.5)
LDLC SERPL CALC-MCNC: 147.2 MG/DL (ref 63–159)
LYMPHOCYTES # BLD AUTO: 1.1 K/UL (ref 1–4.8)
LYMPHOCYTES NFR BLD: 21.6 % (ref 18–48)
MCH RBC QN AUTO: 30.8 PG (ref 27–31)
MCHC RBC AUTO-ENTMCNC: 33.2 G/DL (ref 32–36)
MCV RBC AUTO: 93 FL (ref 82–98)
MONOCYTES # BLD AUTO: 0.6 K/UL (ref 0.3–1)
MONOCYTES NFR BLD: 11.8 % (ref 4–15)
NEUTROPHILS # BLD AUTO: 3 K/UL (ref 1.8–7.7)
NEUTROPHILS NFR BLD: 60.9 % (ref 38–73)
NONHDLC SERPL-MCNC: 170 MG/DL
NRBC BLD-RTO: 0 /100 WBC
PLATELET # BLD AUTO: 238 K/UL (ref 150–450)
PMV BLD AUTO: 9 FL (ref 9.2–12.9)
POTASSIUM SERPL-SCNC: 4.6 MMOL/L (ref 3.5–5.1)
PROT SERPL-MCNC: 7.1 G/DL (ref 6–8.4)
RBC # BLD AUTO: 4.32 M/UL (ref 4.6–6.2)
SODIUM SERPL-SCNC: 141 MMOL/L (ref 136–145)
TRIGL SERPL-MCNC: 114 MG/DL (ref 30–150)
TSH SERPL DL<=0.005 MIU/L-ACNC: 2.03 UIU/ML (ref 0.4–4)
WBC # BLD AUTO: 4.9 K/UL (ref 3.9–12.7)

## 2021-06-28 PROCEDURE — 84153 ASSAY OF PSA TOTAL: CPT | Performed by: INTERNAL MEDICINE

## 2021-06-28 PROCEDURE — 85025 COMPLETE CBC W/AUTO DIFF WBC: CPT | Performed by: INTERNAL MEDICINE

## 2021-06-28 PROCEDURE — 84443 ASSAY THYROID STIM HORMONE: CPT | Performed by: INTERNAL MEDICINE

## 2021-06-28 PROCEDURE — 36415 COLL VENOUS BLD VENIPUNCTURE: CPT | Performed by: INTERNAL MEDICINE

## 2021-06-28 PROCEDURE — 80061 LIPID PANEL: CPT | Performed by: INTERNAL MEDICINE

## 2021-06-28 PROCEDURE — 83036 HEMOGLOBIN GLYCOSYLATED A1C: CPT | Performed by: INTERNAL MEDICINE

## 2021-06-28 PROCEDURE — 80053 COMPREHEN METABOLIC PANEL: CPT | Performed by: INTERNAL MEDICINE

## 2021-07-07 ENCOUNTER — PATIENT MESSAGE (OUTPATIENT)
Dept: ADMINISTRATIVE | Facility: HOSPITAL | Age: 66
End: 2021-07-07

## 2021-07-19 ENCOUNTER — PATIENT MESSAGE (OUTPATIENT)
Dept: ADMINISTRATIVE | Facility: HOSPITAL | Age: 66
End: 2021-07-19

## 2021-07-22 ENCOUNTER — PATIENT OUTREACH (OUTPATIENT)
Dept: ADMINISTRATIVE | Facility: HOSPITAL | Age: 66
End: 2021-07-22

## 2021-09-13 ENCOUNTER — PATIENT MESSAGE (OUTPATIENT)
Dept: FAMILY MEDICINE | Facility: CLINIC | Age: 66
End: 2021-09-13

## 2021-09-13 DIAGNOSIS — E74.39 GLUCOSE INTOLERANCE: ICD-10-CM

## 2021-09-13 DIAGNOSIS — E78.49 OTHER HYPERLIPIDEMIA: ICD-10-CM

## 2021-09-13 DIAGNOSIS — I10 ESSENTIAL HYPERTENSION: Primary | ICD-10-CM

## 2021-09-13 DIAGNOSIS — R79.89 ELEVATED LFTS: ICD-10-CM

## 2021-09-14 ENCOUNTER — PATIENT MESSAGE (OUTPATIENT)
Dept: FAMILY MEDICINE | Facility: CLINIC | Age: 66
End: 2021-09-14

## 2021-09-14 DIAGNOSIS — U07.1 COVID-19 VIRUS INFECTION: Primary | ICD-10-CM

## 2021-09-17 ENCOUNTER — LAB VISIT (OUTPATIENT)
Dept: LAB | Facility: HOSPITAL | Age: 66
End: 2021-09-17
Attending: FAMILY MEDICINE
Payer: COMMERCIAL

## 2021-09-17 DIAGNOSIS — E78.49 OTHER HYPERLIPIDEMIA: ICD-10-CM

## 2021-09-17 DIAGNOSIS — E74.39 GLUCOSE INTOLERANCE: ICD-10-CM

## 2021-09-17 DIAGNOSIS — U07.1 COVID-19 VIRUS INFECTION: ICD-10-CM

## 2021-09-17 DIAGNOSIS — R79.89 ELEVATED LFTS: ICD-10-CM

## 2021-09-17 LAB
ALBUMIN SERPL BCP-MCNC: 3.9 G/DL (ref 3.5–5.2)
ALP SERPL-CCNC: 89 U/L (ref 55–135)
ALT SERPL W/O P-5'-P-CCNC: 38 U/L (ref 10–44)
ANION GAP SERPL CALC-SCNC: 8 MMOL/L (ref 8–16)
AST SERPL-CCNC: 20 U/L (ref 10–40)
BILIRUB SERPL-MCNC: 0.6 MG/DL (ref 0.1–1)
BUN SERPL-MCNC: 22 MG/DL (ref 8–23)
CALCIUM SERPL-MCNC: 10.1 MG/DL (ref 8.7–10.5)
CHLORIDE SERPL-SCNC: 108 MMOL/L (ref 95–110)
CHOLEST SERPL-MCNC: 223 MG/DL (ref 120–199)
CHOLEST/HDLC SERPL: 4.1 {RATIO} (ref 2–5)
CO2 SERPL-SCNC: 26 MMOL/L (ref 23–29)
CREAT SERPL-MCNC: 0.8 MG/DL (ref 0.5–1.4)
EST. GFR  (AFRICAN AMERICAN): >60 ML/MIN/1.73 M^2
EST. GFR  (NON AFRICAN AMERICAN): >60 ML/MIN/1.73 M^2
ESTIMATED AVG GLUCOSE: 114 MG/DL (ref 68–131)
GLUCOSE SERPL-MCNC: 105 MG/DL (ref 70–110)
HBA1C MFR BLD: 5.6 % (ref 4–5.6)
HDLC SERPL-MCNC: 54 MG/DL (ref 40–75)
HDLC SERPL: 24.2 % (ref 20–50)
LDLC SERPL CALC-MCNC: 146 MG/DL (ref 63–159)
NONHDLC SERPL-MCNC: 169 MG/DL
POTASSIUM SERPL-SCNC: 4.6 MMOL/L (ref 3.5–5.1)
PROT SERPL-MCNC: 7 G/DL (ref 6–8.4)
SODIUM SERPL-SCNC: 142 MMOL/L (ref 136–145)
TRIGL SERPL-MCNC: 115 MG/DL (ref 30–150)

## 2021-09-17 PROCEDURE — 80053 COMPREHEN METABOLIC PANEL: CPT | Performed by: FAMILY MEDICINE

## 2021-09-17 PROCEDURE — 36415 COLL VENOUS BLD VENIPUNCTURE: CPT | Performed by: FAMILY MEDICINE

## 2021-09-17 PROCEDURE — 83036 HEMOGLOBIN GLYCOSYLATED A1C: CPT | Performed by: FAMILY MEDICINE

## 2021-09-17 PROCEDURE — 86769 SARS-COV-2 COVID-19 ANTIBODY: CPT | Performed by: FAMILY MEDICINE

## 2021-09-17 PROCEDURE — 80061 LIPID PANEL: CPT | Performed by: FAMILY MEDICINE

## 2021-09-20 ENCOUNTER — PATIENT MESSAGE (OUTPATIENT)
Dept: FAMILY MEDICINE | Facility: CLINIC | Age: 66
End: 2021-09-20

## 2021-09-20 LAB
SARS-COV-2 IGG SERPL IA-ACNC: ABNORMAL AU/ML
SARS-COV-2 IGG SERPL QL IA: POSITIVE

## 2021-09-24 ENCOUNTER — OFFICE VISIT (OUTPATIENT)
Dept: FAMILY MEDICINE | Facility: CLINIC | Age: 66
End: 2021-09-24
Payer: COMMERCIAL

## 2021-09-24 VITALS
HEIGHT: 74 IN | SYSTOLIC BLOOD PRESSURE: 124 MMHG | BODY MASS INDEX: 30.16 KG/M2 | HEART RATE: 56 BPM | DIASTOLIC BLOOD PRESSURE: 82 MMHG | WEIGHT: 235 LBS | RESPIRATION RATE: 12 BRPM

## 2021-09-24 DIAGNOSIS — U07.1 COVID-19 VIRUS INFECTION: ICD-10-CM

## 2021-09-24 DIAGNOSIS — I49.3 PVC (PREMATURE VENTRICULAR CONTRACTION): ICD-10-CM

## 2021-09-24 DIAGNOSIS — Z78.9 STATIN INTOLERANCE: ICD-10-CM

## 2021-09-24 DIAGNOSIS — Z12.11 SCREEN FOR COLON CANCER: ICD-10-CM

## 2021-09-24 DIAGNOSIS — E78.49 OTHER HYPERLIPIDEMIA: Primary | ICD-10-CM

## 2021-09-24 PROCEDURE — 3044F PR MOST RECENT HEMOGLOBIN A1C LEVEL <7.0%: ICD-10-PCS | Mod: CPTII,S$GLB,, | Performed by: FAMILY MEDICINE

## 2021-09-24 PROCEDURE — 99999 PR PBB SHADOW E&M-EST. PATIENT-LVL IV: CPT | Mod: PBBFAC,,, | Performed by: FAMILY MEDICINE

## 2021-09-24 PROCEDURE — 3288F FALL RISK ASSESSMENT DOCD: CPT | Mod: CPTII,S$GLB,, | Performed by: FAMILY MEDICINE

## 2021-09-24 PROCEDURE — 1159F PR MEDICATION LIST DOCUMENTED IN MEDICAL RECORD: ICD-10-PCS | Mod: CPTII,S$GLB,, | Performed by: FAMILY MEDICINE

## 2021-09-24 PROCEDURE — 99999 PR PBB SHADOW E&M-EST. PATIENT-LVL IV: ICD-10-PCS | Mod: PBBFAC,,, | Performed by: FAMILY MEDICINE

## 2021-09-24 PROCEDURE — 1160F PR REVIEW ALL MEDS BY PRESCRIBER/CLIN PHARMACIST DOCUMENTED: ICD-10-PCS | Mod: CPTII,S$GLB,, | Performed by: FAMILY MEDICINE

## 2021-09-24 PROCEDURE — 3079F PR MOST RECENT DIASTOLIC BLOOD PRESSURE 80-89 MM HG: ICD-10-PCS | Mod: CPTII,S$GLB,, | Performed by: FAMILY MEDICINE

## 2021-09-24 PROCEDURE — 1101F PR PT FALLS ASSESS DOC 0-1 FALLS W/OUT INJ PAST YR: ICD-10-PCS | Mod: CPTII,S$GLB,, | Performed by: FAMILY MEDICINE

## 2021-09-24 PROCEDURE — 1101F PT FALLS ASSESS-DOCD LE1/YR: CPT | Mod: CPTII,S$GLB,, | Performed by: FAMILY MEDICINE

## 2021-09-24 PROCEDURE — 1126F AMNT PAIN NOTED NONE PRSNT: CPT | Mod: CPTII,S$GLB,, | Performed by: FAMILY MEDICINE

## 2021-09-24 PROCEDURE — 1160F RVW MEDS BY RX/DR IN RCRD: CPT | Mod: CPTII,S$GLB,, | Performed by: FAMILY MEDICINE

## 2021-09-24 PROCEDURE — 3074F SYST BP LT 130 MM HG: CPT | Mod: CPTII,S$GLB,, | Performed by: FAMILY MEDICINE

## 2021-09-24 PROCEDURE — 1126F PR PAIN SEVERITY QUANTIFIED, NO PAIN PRESENT: ICD-10-PCS | Mod: CPTII,S$GLB,, | Performed by: FAMILY MEDICINE

## 2021-09-24 PROCEDURE — 99214 PR OFFICE/OUTPT VISIT, EST, LEVL IV, 30-39 MIN: ICD-10-PCS | Mod: S$GLB,,, | Performed by: FAMILY MEDICINE

## 2021-09-24 PROCEDURE — 1159F MED LIST DOCD IN RCRD: CPT | Mod: CPTII,S$GLB,, | Performed by: FAMILY MEDICINE

## 2021-09-24 PROCEDURE — 3044F HG A1C LEVEL LT 7.0%: CPT | Mod: CPTII,S$GLB,, | Performed by: FAMILY MEDICINE

## 2021-09-24 PROCEDURE — 3079F DIAST BP 80-89 MM HG: CPT | Mod: CPTII,S$GLB,, | Performed by: FAMILY MEDICINE

## 2021-09-24 PROCEDURE — 3288F PR FALLS RISK ASSESSMENT DOCUMENTED: ICD-10-PCS | Mod: CPTII,S$GLB,, | Performed by: FAMILY MEDICINE

## 2021-09-24 PROCEDURE — 3008F PR BODY MASS INDEX (BMI) DOCUMENTED: ICD-10-PCS | Mod: CPTII,S$GLB,, | Performed by: FAMILY MEDICINE

## 2021-09-24 PROCEDURE — 3008F BODY MASS INDEX DOCD: CPT | Mod: CPTII,S$GLB,, | Performed by: FAMILY MEDICINE

## 2021-09-24 PROCEDURE — 3074F PR MOST RECENT SYSTOLIC BLOOD PRESSURE < 130 MM HG: ICD-10-PCS | Mod: CPTII,S$GLB,, | Performed by: FAMILY MEDICINE

## 2021-09-24 PROCEDURE — 99214 OFFICE O/P EST MOD 30 MIN: CPT | Mod: S$GLB,,, | Performed by: FAMILY MEDICINE

## 2021-10-20 LAB — NONINV COLON CA DNA+OCC BLD SCRN STL QL: NEGATIVE

## 2021-12-18 ENCOUNTER — PATIENT OUTREACH (OUTPATIENT)
Dept: ADMINISTRATIVE | Facility: OTHER | Age: 66
End: 2021-12-18
Payer: COMMERCIAL

## 2021-12-20 ENCOUNTER — OFFICE VISIT (OUTPATIENT)
Dept: CARDIOLOGY | Facility: CLINIC | Age: 66
End: 2021-12-20
Payer: COMMERCIAL

## 2021-12-20 VITALS
HEART RATE: 74 BPM | DIASTOLIC BLOOD PRESSURE: 81 MMHG | HEIGHT: 74 IN | SYSTOLIC BLOOD PRESSURE: 174 MMHG | WEIGHT: 240.5 LBS | BODY MASS INDEX: 30.87 KG/M2

## 2021-12-20 DIAGNOSIS — E78.5 HYPERLIPIDEMIA, UNSPECIFIED HYPERLIPIDEMIA TYPE: ICD-10-CM

## 2021-12-20 DIAGNOSIS — I49.3 PVC (PREMATURE VENTRICULAR CONTRACTION): ICD-10-CM

## 2021-12-20 DIAGNOSIS — U07.1 COVID-19 VIRUS INFECTION: ICD-10-CM

## 2021-12-20 DIAGNOSIS — I10 HYPERTENSION, UNSPECIFIED TYPE: Primary | ICD-10-CM

## 2021-12-20 PROCEDURE — 99204 PR OFFICE/OUTPT VISIT, NEW, LEVL IV, 45-59 MIN: ICD-10-PCS | Mod: S$GLB,,, | Performed by: STUDENT IN AN ORGANIZED HEALTH CARE EDUCATION/TRAINING PROGRAM

## 2021-12-20 PROCEDURE — 99204 OFFICE O/P NEW MOD 45 MIN: CPT | Mod: S$GLB,,, | Performed by: STUDENT IN AN ORGANIZED HEALTH CARE EDUCATION/TRAINING PROGRAM

## 2021-12-20 PROCEDURE — 99999 PR PBB SHADOW E&M-EST. PATIENT-LVL III: ICD-10-PCS | Mod: PBBFAC,,, | Performed by: STUDENT IN AN ORGANIZED HEALTH CARE EDUCATION/TRAINING PROGRAM

## 2021-12-20 PROCEDURE — 99999 PR PBB SHADOW E&M-EST. PATIENT-LVL III: CPT | Mod: PBBFAC,,, | Performed by: STUDENT IN AN ORGANIZED HEALTH CARE EDUCATION/TRAINING PROGRAM

## 2021-12-20 RX ORDER — LISINOPRIL 20 MG/1
20 TABLET ORAL DAILY
Qty: 90 TABLET | Refills: 3 | Status: SHIPPED | OUTPATIENT
Start: 2021-12-20 | End: 2022-03-18 | Stop reason: SDUPTHER

## 2021-12-27 ENCOUNTER — LAB VISIT (OUTPATIENT)
Dept: LAB | Facility: HOSPITAL | Age: 66
End: 2021-12-27
Attending: STUDENT IN AN ORGANIZED HEALTH CARE EDUCATION/TRAINING PROGRAM
Payer: COMMERCIAL

## 2021-12-27 DIAGNOSIS — E78.5 HYPERLIPIDEMIA, UNSPECIFIED HYPERLIPIDEMIA TYPE: ICD-10-CM

## 2021-12-27 DIAGNOSIS — I10 HYPERTENSION, UNSPECIFIED TYPE: ICD-10-CM

## 2021-12-27 LAB
ANION GAP SERPL CALC-SCNC: 8 MMOL/L (ref 8–16)
BUN SERPL-MCNC: 23 MG/DL (ref 8–23)
CALCIUM SERPL-MCNC: 10 MG/DL (ref 8.7–10.5)
CHLORIDE SERPL-SCNC: 105 MMOL/L (ref 95–110)
CHOLEST SERPL-MCNC: 225 MG/DL (ref 120–199)
CHOLEST/HDLC SERPL: 3.9 {RATIO} (ref 2–5)
CO2 SERPL-SCNC: 27 MMOL/L (ref 23–29)
CREAT SERPL-MCNC: 0.9 MG/DL (ref 0.5–1.4)
EST. GFR  (AFRICAN AMERICAN): >60 ML/MIN/1.73 M^2
EST. GFR  (NON AFRICAN AMERICAN): >60 ML/MIN/1.73 M^2
GLUCOSE SERPL-MCNC: 104 MG/DL (ref 70–110)
HDLC SERPL-MCNC: 58 MG/DL (ref 40–75)
HDLC SERPL: 25.8 % (ref 20–50)
LDLC SERPL CALC-MCNC: 137 MG/DL (ref 63–159)
NONHDLC SERPL-MCNC: 167 MG/DL
POTASSIUM SERPL-SCNC: 4.5 MMOL/L (ref 3.5–5.1)
SODIUM SERPL-SCNC: 140 MMOL/L (ref 136–145)
TRIGL SERPL-MCNC: 150 MG/DL (ref 30–150)

## 2021-12-27 PROCEDURE — 36415 COLL VENOUS BLD VENIPUNCTURE: CPT | Performed by: STUDENT IN AN ORGANIZED HEALTH CARE EDUCATION/TRAINING PROGRAM

## 2021-12-27 PROCEDURE — 80061 LIPID PANEL: CPT | Performed by: STUDENT IN AN ORGANIZED HEALTH CARE EDUCATION/TRAINING PROGRAM

## 2021-12-27 PROCEDURE — 80048 BASIC METABOLIC PNL TOTAL CA: CPT | Performed by: STUDENT IN AN ORGANIZED HEALTH CARE EDUCATION/TRAINING PROGRAM

## 2022-01-07 ENCOUNTER — TELEPHONE (OUTPATIENT)
Dept: CARDIOLOGY | Facility: HOSPITAL | Age: 67
End: 2022-01-07
Payer: COMMERCIAL

## 2022-03-08 ENCOUNTER — PATIENT MESSAGE (OUTPATIENT)
Dept: ADMINISTRATIVE | Facility: OTHER | Age: 67
End: 2022-03-08
Payer: COMMERCIAL

## 2022-03-08 ENCOUNTER — PATIENT MESSAGE (OUTPATIENT)
Dept: FAMILY MEDICINE | Facility: CLINIC | Age: 67
End: 2022-03-08
Payer: COMMERCIAL

## 2022-03-08 DIAGNOSIS — E78.49 OTHER HYPERLIPIDEMIA: ICD-10-CM

## 2022-03-08 DIAGNOSIS — E74.39 GLUCOSE INTOLERANCE: Primary | ICD-10-CM

## 2022-03-08 DIAGNOSIS — Z20.822 EXPOSURE TO COVID-19 VIRUS: ICD-10-CM

## 2022-03-14 ENCOUNTER — PATIENT MESSAGE (OUTPATIENT)
Dept: FAMILY MEDICINE | Facility: CLINIC | Age: 67
End: 2022-03-14
Payer: COMMERCIAL

## 2022-03-16 ENCOUNTER — LAB VISIT (OUTPATIENT)
Dept: LAB | Facility: HOSPITAL | Age: 67
End: 2022-03-16
Attending: FAMILY MEDICINE
Payer: COMMERCIAL

## 2022-03-16 DIAGNOSIS — E74.39 GLUCOSE INTOLERANCE: ICD-10-CM

## 2022-03-16 DIAGNOSIS — Z20.822 EXPOSURE TO COVID-19 VIRUS: ICD-10-CM

## 2022-03-16 DIAGNOSIS — E78.49 OTHER HYPERLIPIDEMIA: ICD-10-CM

## 2022-03-16 LAB
ALBUMIN SERPL BCP-MCNC: 3.8 G/DL (ref 3.5–5.2)
ALP SERPL-CCNC: 101 U/L (ref 55–135)
ALT SERPL W/O P-5'-P-CCNC: 37 U/L (ref 10–44)
ANION GAP SERPL CALC-SCNC: 10 MMOL/L (ref 8–16)
AST SERPL-CCNC: 22 U/L (ref 10–40)
BILIRUB SERPL-MCNC: 0.6 MG/DL (ref 0.1–1)
BUN SERPL-MCNC: 19 MG/DL (ref 8–23)
CALCIUM SERPL-MCNC: 10 MG/DL (ref 8.7–10.5)
CHLORIDE SERPL-SCNC: 105 MMOL/L (ref 95–110)
CHOLEST SERPL-MCNC: 225 MG/DL (ref 120–199)
CHOLEST/HDLC SERPL: 4.1 {RATIO} (ref 2–5)
CO2 SERPL-SCNC: 27 MMOL/L (ref 23–29)
CREAT SERPL-MCNC: 0.9 MG/DL (ref 0.5–1.4)
EST. GFR  (AFRICAN AMERICAN): >60 ML/MIN/1.73 M^2
EST. GFR  (NON AFRICAN AMERICAN): >60 ML/MIN/1.73 M^2
ESTIMATED AVG GLUCOSE: 123 MG/DL (ref 68–131)
GLUCOSE SERPL-MCNC: 114 MG/DL (ref 70–110)
HBA1C MFR BLD: 5.9 % (ref 4–5.6)
HDLC SERPL-MCNC: 55 MG/DL (ref 40–75)
HDLC SERPL: 24.4 % (ref 20–50)
LDLC SERPL CALC-MCNC: 150.2 MG/DL (ref 63–159)
NONHDLC SERPL-MCNC: 170 MG/DL
POTASSIUM SERPL-SCNC: 4.6 MMOL/L (ref 3.5–5.1)
PROT SERPL-MCNC: 6.8 G/DL (ref 6–8.4)
SARS-COV-2 IGG SERPL IA-ACNC: 2246.8 AU/ML
SARS-COV-2 IGG SERPL QL IA: POSITIVE
SODIUM SERPL-SCNC: 142 MMOL/L (ref 136–145)
TRIGL SERPL-MCNC: 99 MG/DL (ref 30–150)

## 2022-03-16 PROCEDURE — 86769 SARS-COV-2 COVID-19 ANTIBODY: CPT | Performed by: FAMILY MEDICINE

## 2022-03-16 PROCEDURE — 80053 COMPREHEN METABOLIC PANEL: CPT | Performed by: FAMILY MEDICINE

## 2022-03-16 PROCEDURE — 80061 LIPID PANEL: CPT | Performed by: FAMILY MEDICINE

## 2022-03-16 PROCEDURE — 36415 COLL VENOUS BLD VENIPUNCTURE: CPT | Performed by: FAMILY MEDICINE

## 2022-03-16 PROCEDURE — 83036 HEMOGLOBIN GLYCOSYLATED A1C: CPT | Performed by: FAMILY MEDICINE

## 2022-03-18 ENCOUNTER — OFFICE VISIT (OUTPATIENT)
Dept: FAMILY MEDICINE | Facility: CLINIC | Age: 67
End: 2022-03-18
Payer: COMMERCIAL

## 2022-03-18 VITALS
SYSTOLIC BLOOD PRESSURE: 122 MMHG | WEIGHT: 238.31 LBS | DIASTOLIC BLOOD PRESSURE: 74 MMHG | RESPIRATION RATE: 16 BRPM | HEIGHT: 74 IN | HEART RATE: 64 BPM | BODY MASS INDEX: 30.59 KG/M2

## 2022-03-18 DIAGNOSIS — Z78.9 STATIN INTOLERANCE: ICD-10-CM

## 2022-03-18 DIAGNOSIS — N52.9 ERECTILE DYSFUNCTION, UNSPECIFIED ERECTILE DYSFUNCTION TYPE: ICD-10-CM

## 2022-03-18 DIAGNOSIS — E74.39 GLUCOSE INTOLERANCE: Primary | ICD-10-CM

## 2022-03-18 DIAGNOSIS — I10 ESSENTIAL HYPERTENSION: ICD-10-CM

## 2022-03-18 PROCEDURE — 99999 PR PBB SHADOW E&M-EST. PATIENT-LVL III: CPT | Mod: PBBFAC,,, | Performed by: FAMILY MEDICINE

## 2022-03-18 PROCEDURE — 3078F DIAST BP <80 MM HG: CPT | Mod: CPTII,S$GLB,, | Performed by: FAMILY MEDICINE

## 2022-03-18 PROCEDURE — 3008F PR BODY MASS INDEX (BMI) DOCUMENTED: ICD-10-PCS | Mod: CPTII,S$GLB,, | Performed by: FAMILY MEDICINE

## 2022-03-18 PROCEDURE — 4010F ACE/ARB THERAPY RXD/TAKEN: CPT | Mod: CPTII,S$GLB,, | Performed by: FAMILY MEDICINE

## 2022-03-18 PROCEDURE — 4010F PR ACE/ARB THEARPY RXD/TAKEN: ICD-10-PCS | Mod: CPTII,S$GLB,, | Performed by: FAMILY MEDICINE

## 2022-03-18 PROCEDURE — 3008F BODY MASS INDEX DOCD: CPT | Mod: CPTII,S$GLB,, | Performed by: FAMILY MEDICINE

## 2022-03-18 PROCEDURE — 1126F PR PAIN SEVERITY QUANTIFIED, NO PAIN PRESENT: ICD-10-PCS | Mod: CPTII,S$GLB,, | Performed by: FAMILY MEDICINE

## 2022-03-18 PROCEDURE — 1160F RVW MEDS BY RX/DR IN RCRD: CPT | Mod: CPTII,S$GLB,, | Performed by: FAMILY MEDICINE

## 2022-03-18 PROCEDURE — 1101F PR PT FALLS ASSESS DOC 0-1 FALLS W/OUT INJ PAST YR: ICD-10-PCS | Mod: CPTII,S$GLB,, | Performed by: FAMILY MEDICINE

## 2022-03-18 PROCEDURE — 3074F PR MOST RECENT SYSTOLIC BLOOD PRESSURE < 130 MM HG: ICD-10-PCS | Mod: CPTII,S$GLB,, | Performed by: FAMILY MEDICINE

## 2022-03-18 PROCEDURE — 1101F PT FALLS ASSESS-DOCD LE1/YR: CPT | Mod: CPTII,S$GLB,, | Performed by: FAMILY MEDICINE

## 2022-03-18 PROCEDURE — 1159F PR MEDICATION LIST DOCUMENTED IN MEDICAL RECORD: ICD-10-PCS | Mod: CPTII,S$GLB,, | Performed by: FAMILY MEDICINE

## 2022-03-18 PROCEDURE — 3078F PR MOST RECENT DIASTOLIC BLOOD PRESSURE < 80 MM HG: ICD-10-PCS | Mod: CPTII,S$GLB,, | Performed by: FAMILY MEDICINE

## 2022-03-18 PROCEDURE — 1160F PR REVIEW ALL MEDS BY PRESCRIBER/CLIN PHARMACIST DOCUMENTED: ICD-10-PCS | Mod: CPTII,S$GLB,, | Performed by: FAMILY MEDICINE

## 2022-03-18 PROCEDURE — 1126F AMNT PAIN NOTED NONE PRSNT: CPT | Mod: CPTII,S$GLB,, | Performed by: FAMILY MEDICINE

## 2022-03-18 PROCEDURE — 3288F PR FALLS RISK ASSESSMENT DOCUMENTED: ICD-10-PCS | Mod: CPTII,S$GLB,, | Performed by: FAMILY MEDICINE

## 2022-03-18 PROCEDURE — 1159F MED LIST DOCD IN RCRD: CPT | Mod: CPTII,S$GLB,, | Performed by: FAMILY MEDICINE

## 2022-03-18 PROCEDURE — 3044F PR MOST RECENT HEMOGLOBIN A1C LEVEL <7.0%: ICD-10-PCS | Mod: CPTII,S$GLB,, | Performed by: FAMILY MEDICINE

## 2022-03-18 PROCEDURE — 99214 PR OFFICE/OUTPT VISIT, EST, LEVL IV, 30-39 MIN: ICD-10-PCS | Mod: S$GLB,,, | Performed by: FAMILY MEDICINE

## 2022-03-18 PROCEDURE — 99214 OFFICE O/P EST MOD 30 MIN: CPT | Mod: S$GLB,,, | Performed by: FAMILY MEDICINE

## 2022-03-18 PROCEDURE — 3288F FALL RISK ASSESSMENT DOCD: CPT | Mod: CPTII,S$GLB,, | Performed by: FAMILY MEDICINE

## 2022-03-18 PROCEDURE — 3044F HG A1C LEVEL LT 7.0%: CPT | Mod: CPTII,S$GLB,, | Performed by: FAMILY MEDICINE

## 2022-03-18 PROCEDURE — 99999 PR PBB SHADOW E&M-EST. PATIENT-LVL III: ICD-10-PCS | Mod: PBBFAC,,, | Performed by: FAMILY MEDICINE

## 2022-03-18 PROCEDURE — 3074F SYST BP LT 130 MM HG: CPT | Mod: CPTII,S$GLB,, | Performed by: FAMILY MEDICINE

## 2022-03-18 RX ORDER — LISINOPRIL 10 MG/1
10 TABLET ORAL DAILY
Qty: 90 TABLET | Refills: 3 | Status: SHIPPED | OUTPATIENT
Start: 2022-03-18 | End: 2023-03-07

## 2022-03-18 RX ORDER — TADALAFIL 20 MG/1
20 TABLET ORAL DAILY PRN
Qty: 6 TABLET | Refills: 5 | Status: SHIPPED | OUTPATIENT
Start: 2022-03-18 | End: 2022-04-14 | Stop reason: SDUPTHER

## 2022-03-18 NOTE — PROGRESS NOTES
Subjective:       Patient ID: Leonardo Pisano is a 66 y.o. male.    Chief Complaint: Follow-up (3 month f/u )    Patient here for a checkup.  He has fully recovered from COVID-19 pneumonia.  He is recovering well.  He has no shortness of breath.  He denies palpitations, problems with taste or smell, dyspnea on exertion, cough.  He has a history of hyperlipidemia.  He is statin intolerant.  He tolerates his blood pressure medication well and is not having side effects such as chronic cough or dizziness.  He is having ED issues    Review of Systems   Constitutional: Negative for activity change, chills, fatigue, fever and unexpected weight change.   HENT: Negative for sore throat and trouble swallowing.    Respiratory: Negative for cough, chest tightness and shortness of breath.    Cardiovascular: Negative for chest pain and leg swelling.   Gastrointestinal: Negative for abdominal pain.   Endocrine: Negative for cold intolerance and heat intolerance.   Genitourinary: Negative for difficulty urinating.   Musculoskeletal: Negative for back pain and joint swelling.   Skin: Negative for rash.   Neurological: Negative for numbness.   Hematological: Negative for adenopathy.   Psychiatric/Behavioral: Negative for decreased concentration.       Objective:      Vitals:    03/18/22 1041   BP: 122/74   Pulse: 64   Resp: 16     Physical Exam  Constitutional:       Appearance: He is well-developed.   HENT:      Head: Normocephalic and atraumatic.      Right Ear: Tympanic membrane, ear canal and external ear normal.      Left Ear: Tympanic membrane, ear canal and external ear normal.      Nose: Nose normal.      Mouth/Throat:      Mouth: Mucous membranes are moist.   Eyes:      Conjunctiva/sclera: Conjunctivae normal.      Pupils: Pupils are equal, round, and reactive to light.   Neck:      Thyroid: No thyromegaly.      Trachea: No tracheal deviation.   Cardiovascular:      Rate and Rhythm: Normal rate. Rhythm irregular. Frequent  extrasystoles are present.     Chest Wall: PMI is not displaced. No thrill.      Heart sounds: Normal heart sounds. No murmur heard.    No gallop.   Pulmonary:      Effort: Pulmonary effort is normal.      Breath sounds: Normal breath sounds.   Abdominal:      General: Bowel sounds are normal. There is no distension.      Palpations: Abdomen is soft. There is no mass.      Tenderness: There is no abdominal tenderness. There is no guarding or rebound.      Hernia: There is no hernia in the left inguinal area.   Musculoskeletal:         General: Normal range of motion.      Cervical back: Normal range of motion and neck supple.      Right lower leg: No edema.      Left lower leg: No edema.   Skin:     General: Skin is warm and dry.   Neurological:      General: No focal deficit present.      Mental Status: He is alert and oriented to person, place, and time.      Cranial Nerves: No cranial nerve deficit.      Gait: Gait normal.      Deep Tendon Reflexes: Reflexes are normal and symmetric.   Psychiatric:         Mood and Affect: Mood normal.         Behavior: Behavior normal.         Thought Content: Thought content normal.         Judgment: Judgment normal.         Lab Results   Component Value Date    LDLCALC 150.2 03/16/2022     Lab Results   Component Value Date    WBC 4.90 06/28/2021    HGB 13.3 (L) 06/28/2021    HCT 40.1 06/28/2021    MCV 93 06/28/2021     06/28/2021       BMP  Lab Results   Component Value Date     03/16/2022    K 4.6 03/16/2022     03/16/2022    CO2 27 03/16/2022    BUN 19 03/16/2022    CREATININE 0.9 03/16/2022    CALCIUM 10.0 03/16/2022    ANIONGAP 10 03/16/2022    ESTGFRAFRICA >60 03/16/2022    EGFRNONAA >60 03/16/2022       Assessment:       1. Glucose intolerance    2. Essential hypertension    3. Statin intolerance    4. Erectile dysfunction, unspecified erectile dysfunction type        Plan:   Leonardo was seen today for follow-up.    Diagnoses and all orders for this  visit:    COVID-19 virus infection  Recovered     Statin intolerance/Mixed hyperlipidemia  He is not interested in taking medication for his cholesterol  Diet and exercise recommended    Screen for colon cancer  -     Cologuard Screening was negative    PVC (premature ventricular contraction)  Resolved    Glucose intolerance    Essential hypertension  -     lisinopriL 10 MG tablet; Take 1 tablet (10 mg total) by mouth once daily.  Dispense: 90 tablet; Refill: 3    Statin intolerance    Erectile dysfunction, unspecified erectile dysfunction type  -     tadalafiL (CIALIS) 20 MG Tab; Take 1 tablet (20 mg total) by mouth daily as needed.  Dispense: 6 tablet; Refill: 5      Return to clinic in 6 months

## 2022-06-03 ENCOUNTER — TELEPHONE (OUTPATIENT)
Dept: FAMILY MEDICINE | Facility: CLINIC | Age: 67
End: 2022-06-03
Payer: COMMERCIAL

## 2022-06-03 DIAGNOSIS — U07.1 COVID-19 VIRUS INFECTION: Primary | ICD-10-CM

## 2022-06-03 RX ORDER — NIRMATRELVIR AND RITONAVIR 300-100 MG
3 KIT ORAL 2 TIMES DAILY
Qty: 30 TABLET | Refills: 0 | Status: SHIPPED | OUTPATIENT
Start: 2022-06-03 | End: 2023-03-13

## 2022-06-03 NOTE — TELEPHONE ENCOUNTER
Diagnoses and all orders for this visit:    COVID-19 virus infection  -     nirmatrelvir-ritonavir (PAXLOVID, EUA,) 150 mg x 2- 100 mg copackaged tablets (EUA); 1 ritonavir and 2 nirmatrelvir every morning and 1 ritonavir and 2 nirmatrelvir every evening for a total of 5 days      Sent in to [facility]    Monoclonal antibody infusion is on short supply    4-COVID risk score

## 2022-06-03 NOTE — TELEPHONE ENCOUNTER
----- Message from Alli Vincent sent at 6/3/2022 11:15 AM CDT -----  Contact: Patient  Leonardo Pisano  MRN: [id number]  : 1955  PCP: Saud Coles  Home Phone      [phone]  Work Phone      Not on file.  Mobile          [phone]      MESSAGE: Urgent Care today - positive Covid - told to contact PCP to requesting Antibody injection -- please advise    Call [phone]    PCP: Sharyn

## 2022-09-14 ENCOUNTER — TELEPHONE (OUTPATIENT)
Dept: INTERNAL MEDICINE | Facility: CLINIC | Age: 67
End: 2022-09-14
Payer: COMMERCIAL

## 2022-09-14 ENCOUNTER — OFFICE VISIT (OUTPATIENT)
Dept: CARDIOLOGY | Facility: CLINIC | Age: 67
End: 2022-09-14
Payer: COMMERCIAL

## 2022-09-14 VITALS
HEART RATE: 66 BPM | BODY MASS INDEX: 31.15 KG/M2 | DIASTOLIC BLOOD PRESSURE: 75 MMHG | WEIGHT: 242.75 LBS | SYSTOLIC BLOOD PRESSURE: 156 MMHG | HEIGHT: 74 IN

## 2022-09-14 DIAGNOSIS — E74.39 GLUCOSE INTOLERANCE: Primary | ICD-10-CM

## 2022-09-14 DIAGNOSIS — E78.5 HYPERLIPIDEMIA, UNSPECIFIED HYPERLIPIDEMIA TYPE: ICD-10-CM

## 2022-09-14 DIAGNOSIS — I10 ESSENTIAL HYPERTENSION: ICD-10-CM

## 2022-09-14 DIAGNOSIS — E78.5 HYPERLIPIDEMIA, UNSPECIFIED HYPERLIPIDEMIA TYPE: Primary | ICD-10-CM

## 2022-09-14 DIAGNOSIS — Z12.5 ENCOUNTER FOR PROSTATE CANCER SCREENING: ICD-10-CM

## 2022-09-14 PROCEDURE — 99215 PR OFFICE/OUTPT VISIT, EST, LEVL V, 40-54 MIN: ICD-10-PCS | Mod: S$GLB,,, | Performed by: STUDENT IN AN ORGANIZED HEALTH CARE EDUCATION/TRAINING PROGRAM

## 2022-09-14 PROCEDURE — 1126F PR PAIN SEVERITY QUANTIFIED, NO PAIN PRESENT: ICD-10-PCS | Mod: CPTII,S$GLB,, | Performed by: STUDENT IN AN ORGANIZED HEALTH CARE EDUCATION/TRAINING PROGRAM

## 2022-09-14 PROCEDURE — 1159F MED LIST DOCD IN RCRD: CPT | Mod: CPTII,S$GLB,, | Performed by: STUDENT IN AN ORGANIZED HEALTH CARE EDUCATION/TRAINING PROGRAM

## 2022-09-14 PROCEDURE — 3078F PR MOST RECENT DIASTOLIC BLOOD PRESSURE < 80 MM HG: ICD-10-PCS | Mod: CPTII,S$GLB,, | Performed by: STUDENT IN AN ORGANIZED HEALTH CARE EDUCATION/TRAINING PROGRAM

## 2022-09-14 PROCEDURE — 4010F PR ACE/ARB THEARPY RXD/TAKEN: ICD-10-PCS | Mod: CPTII,S$GLB,, | Performed by: STUDENT IN AN ORGANIZED HEALTH CARE EDUCATION/TRAINING PROGRAM

## 2022-09-14 PROCEDURE — 3078F DIAST BP <80 MM HG: CPT | Mod: CPTII,S$GLB,, | Performed by: STUDENT IN AN ORGANIZED HEALTH CARE EDUCATION/TRAINING PROGRAM

## 2022-09-14 PROCEDURE — 1159F PR MEDICATION LIST DOCUMENTED IN MEDICAL RECORD: ICD-10-PCS | Mod: CPTII,S$GLB,, | Performed by: STUDENT IN AN ORGANIZED HEALTH CARE EDUCATION/TRAINING PROGRAM

## 2022-09-14 PROCEDURE — 3008F PR BODY MASS INDEX (BMI) DOCUMENTED: ICD-10-PCS | Mod: CPTII,S$GLB,, | Performed by: STUDENT IN AN ORGANIZED HEALTH CARE EDUCATION/TRAINING PROGRAM

## 2022-09-14 PROCEDURE — 3044F PR MOST RECENT HEMOGLOBIN A1C LEVEL <7.0%: ICD-10-PCS | Mod: CPTII,S$GLB,, | Performed by: STUDENT IN AN ORGANIZED HEALTH CARE EDUCATION/TRAINING PROGRAM

## 2022-09-14 PROCEDURE — 99999 PR PBB SHADOW E&M-EST. PATIENT-LVL IV: ICD-10-PCS | Mod: PBBFAC,,, | Performed by: STUDENT IN AN ORGANIZED HEALTH CARE EDUCATION/TRAINING PROGRAM

## 2022-09-14 PROCEDURE — 1160F PR REVIEW ALL MEDS BY PRESCRIBER/CLIN PHARMACIST DOCUMENTED: ICD-10-PCS | Mod: CPTII,S$GLB,, | Performed by: STUDENT IN AN ORGANIZED HEALTH CARE EDUCATION/TRAINING PROGRAM

## 2022-09-14 PROCEDURE — 99999 PR PBB SHADOW E&M-EST. PATIENT-LVL IV: CPT | Mod: PBBFAC,,, | Performed by: STUDENT IN AN ORGANIZED HEALTH CARE EDUCATION/TRAINING PROGRAM

## 2022-09-14 PROCEDURE — 99215 OFFICE O/P EST HI 40 MIN: CPT | Mod: S$GLB,,, | Performed by: STUDENT IN AN ORGANIZED HEALTH CARE EDUCATION/TRAINING PROGRAM

## 2022-09-14 PROCEDURE — 3044F HG A1C LEVEL LT 7.0%: CPT | Mod: CPTII,S$GLB,, | Performed by: STUDENT IN AN ORGANIZED HEALTH CARE EDUCATION/TRAINING PROGRAM

## 2022-09-14 PROCEDURE — 3077F PR MOST RECENT SYSTOLIC BLOOD PRESSURE >= 140 MM HG: ICD-10-PCS | Mod: CPTII,S$GLB,, | Performed by: STUDENT IN AN ORGANIZED HEALTH CARE EDUCATION/TRAINING PROGRAM

## 2022-09-14 PROCEDURE — 1126F AMNT PAIN NOTED NONE PRSNT: CPT | Mod: CPTII,S$GLB,, | Performed by: STUDENT IN AN ORGANIZED HEALTH CARE EDUCATION/TRAINING PROGRAM

## 2022-09-14 PROCEDURE — 3077F SYST BP >= 140 MM HG: CPT | Mod: CPTII,S$GLB,, | Performed by: STUDENT IN AN ORGANIZED HEALTH CARE EDUCATION/TRAINING PROGRAM

## 2022-09-14 PROCEDURE — 3008F BODY MASS INDEX DOCD: CPT | Mod: CPTII,S$GLB,, | Performed by: STUDENT IN AN ORGANIZED HEALTH CARE EDUCATION/TRAINING PROGRAM

## 2022-09-14 PROCEDURE — 4010F ACE/ARB THERAPY RXD/TAKEN: CPT | Mod: CPTII,S$GLB,, | Performed by: STUDENT IN AN ORGANIZED HEALTH CARE EDUCATION/TRAINING PROGRAM

## 2022-09-14 PROCEDURE — 1160F RVW MEDS BY RX/DR IN RCRD: CPT | Mod: CPTII,S$GLB,, | Performed by: STUDENT IN AN ORGANIZED HEALTH CARE EDUCATION/TRAINING PROGRAM

## 2022-09-14 RX ORDER — ROSUVASTATIN CALCIUM 20 MG/1
20 TABLET, COATED ORAL DAILY
Qty: 90 TABLET | Refills: 3 | Status: SHIPPED | OUTPATIENT
Start: 2022-09-14 | End: 2023-09-11

## 2022-09-14 NOTE — PROGRESS NOTES
CARDIOLOGY CLINIC      PCP:  Saud Coles MD  Cardiologist: Tari Franco MD      Reason for Consult:  Follow up      PMH:   1. HTN on lisinopril 20mg daily  2. Hyperlipidemia diet controlled   3. PreDM diet controlled.       HPI:  Leonardo Villalobos is a 67 y.o. M with PMH as above who comes to the cardiology clinic to follow up on his chronic medical conditions. The last time he was seen at the cardiology clinic was on 12/20/21. At that time, Mr. Pisano complained of palpitations; however, the palpitations were not particularly bothersome to him. In an attempt to control his dyslipidemia, statins were recommended; however, he refused statins because he was scared of statin-induced myopathy. The plan was to adhere to the Mediterranean diet and to recheck lipid profile. The patient agreed if his lipid profile worsens, he will agree to statins during this visit. Today the patient has no complaints. He walks daily. His current exercise capacity is more than two flights of stairs (at baseline).     Mr. Pisano states being compliant with diet. He has no complains.    FH:   Father: CAD-CABG at the age of 87  Mother with PPM (Patient doesn't know reason for PPM placement).  Brothers: HTN       ROS:  Constitution: Negative for fever, chills, weight loss or gain.   HENT: Negative for sore throat, rhinorrhea, or headache.  Eyes: Negative for blurred or double vision.   Cardiovascular: Negative for exertional chest pain  Pulmonary: Negative for dyspnea on exertion  Gastrointestinal: Negative for abdominal pain, nausea, vomiting, or diarrhea. Negative for melena/hematochezia.  : Negative for dysuria.   Neurological: Negative for focal weakness or sensory changes.  Skin: No bleeding or bruising      Past Medical History:   Diagnosis Date    Degenerative disc disease     Essential hypertension 4/3/2018    Hepatitis     Hyperlipidemia      Past Surgical History:   Procedure  Laterality Date    SHOULDER SURGERY  4/25/12    rt shoulder     Family History   Problem Relation Age of Onset    Cancer Mother         liver    Heart disease Father         cabg    Diabetes Daughter     Hyperlipidemia Sister     Hyperlipidemia Sister      Social History     Tobacco Use    Smoking status: Never    Smokeless tobacco: Never   Substance Use Topics    Alcohol use: No     Review of patient's allergies indicates:   Allergen Reactions    No known allergies        Current Outpatient Medications on File Prior to Visit   Medication Sig Dispense Refill    ascorbic acid, vitamin C, (VITAMIN C) 500 MG tablet Take 1 tablet (500 mg total) by mouth 2 (two) times daily. 60 tablet 0    aspirin (ECOTRIN) 81 MG EC tablet Take 1 tablet (81 mg total) by mouth once daily. 30 tablet 0    lisinopriL 10 MG tablet Take 1 tablet (10 mg total) by mouth once daily. 90 tablet 3    multivitamin capsule Take 1 capsule by mouth once daily.      nirmatrelvir-ritonavir (PAXLOVID, EUA,) 150 mg x 2- 100 mg copackaged tablets (EUA) Take 1 ritonavir and 2 nirmatrelvir by mouth every morning and 1 ritonavir and 2 nirmatrelvir every evening for a total of 5 days 30 tablet 0    tadalafiL (CIALIS) 20 MG Tab Take 1 tablet (20 mg total) by mouth daily as needed. 6 tablet 5     No current facility-administered medications on file prior to visit.       OBJECTIVE:  /75  HR 86  RR 16   Gen: Lying in bed, no acute distress  HEENT: Supple, no JVD  Cvs: Regular rate and rhythm, no murmurs  Resp: Normal work of breathing, clear bilaterally  Abd: Soft, non-tender and not distended  Ext: Warm, no edema. Pulse exam below. No carotid, abdominal, femoral bruits.  Vascular:   LEFT RIGHT   RADIAL 3+ 3+   BRACHIAL 3+ 3+   TP 3+ 3+       03/22 LABS:  HbA1C 5.6   (137)  Cholesterol 225   HDL 27      IMPRESSION:  68 yo M with PMH of HTN, controlled on lisinopril 20mg daily, hyperlipidemia poorly controlled (on diet), preDM diet controlled who comes  to the cardiology clinic to follow up on chronic medical conditions. The patient has no complaints; however, on review of labs, his LDL has increased from 137 to 150, given that his risk of a cardiovascular event (coronary or stroke death or non-fatal MI or stroke) in the next 10 years calculated by ASCVD is 26.7%, the patient will benefit from starting a high-intensity statin such as rosuvastatin 20 mg daily      PLAN:    HTN   Controlled- On C BP monitoring  On lisinopril 10 mg daily     Plan:  Continue lisinopril 10 mg daily     HLD  HTN and HLD    (137) Cholesterol 225 HDL 27  ASCVD 26.7%    On mediterranean diet     Plan:  Start rosuvastatin 20 mg daily   Diet and exercise encouraged   RTC 6 months with new lipid profile     PreDM  Diet controlled   A1C 5.6    Plan:  Diet and exercise encouraged     Joao Crook M.D.  Page # [phone]  Cardiovascular Fellow  [facility]

## 2022-09-16 ENCOUNTER — LAB VISIT (OUTPATIENT)
Dept: LAB | Facility: HOSPITAL | Age: 67
End: 2022-09-16
Attending: FAMILY MEDICINE
Payer: COMMERCIAL

## 2022-09-16 DIAGNOSIS — Z12.5 ENCOUNTER FOR PROSTATE CANCER SCREENING: ICD-10-CM

## 2022-09-16 DIAGNOSIS — E74.39 GLUCOSE INTOLERANCE: ICD-10-CM

## 2022-09-16 DIAGNOSIS — E78.5 HYPERLIPIDEMIA, UNSPECIFIED HYPERLIPIDEMIA TYPE: ICD-10-CM

## 2022-09-16 DIAGNOSIS — I10 ESSENTIAL HYPERTENSION: ICD-10-CM

## 2022-09-16 LAB
ALBUMIN SERPL BCP-MCNC: 3.8 G/DL (ref 3.5–5.2)
ALP SERPL-CCNC: 98 U/L (ref 55–135)
ALT SERPL W/O P-5'-P-CCNC: 31 U/L (ref 10–44)
ANION GAP SERPL CALC-SCNC: 9 MMOL/L (ref 8–16)
AST SERPL-CCNC: 19 U/L (ref 10–40)
BASOPHILS # BLD AUTO: 0.04 K/UL (ref 0–0.2)
BASOPHILS NFR BLD: 0.7 % (ref 0–1.9)
BILIRUB SERPL-MCNC: 0.6 MG/DL (ref 0.1–1)
BUN SERPL-MCNC: 21 MG/DL (ref 8–23)
CALCIUM SERPL-MCNC: 9.6 MG/DL (ref 8.7–10.5)
CHLORIDE SERPL-SCNC: 107 MMOL/L (ref 95–110)
CHOLEST SERPL-MCNC: 184 MG/DL (ref 120–199)
CHOLEST/HDLC SERPL: 3.8 {RATIO} (ref 2–5)
CO2 SERPL-SCNC: 25 MMOL/L (ref 23–29)
COMPLEXED PSA SERPL-MCNC: 0.6 NG/ML (ref 0–4)
CREAT SERPL-MCNC: 0.9 MG/DL (ref 0.5–1.4)
DIFFERENTIAL METHOD: ABNORMAL
EOSINOPHIL # BLD AUTO: 0.4 K/UL (ref 0–0.5)
EOSINOPHIL NFR BLD: 6.5 % (ref 0–8)
ERYTHROCYTE [DISTWIDTH] IN BLOOD BY AUTOMATED COUNT: 12.8 % (ref 11.5–14.5)
EST. GFR  (NO RACE VARIABLE): >60 ML/MIN/1.73 M^2
ESTIMATED AVG GLUCOSE: 117 MG/DL (ref 68–131)
GLUCOSE SERPL-MCNC: 112 MG/DL (ref 70–110)
HBA1C MFR BLD: 5.7 % (ref 4–5.6)
HCT VFR BLD AUTO: 42 % (ref 40–54)
HDLC SERPL-MCNC: 48 MG/DL (ref 40–75)
HDLC SERPL: 26.1 % (ref 20–50)
HGB BLD-MCNC: 13.9 G/DL (ref 14–18)
IMM GRANULOCYTES # BLD AUTO: 0.02 K/UL (ref 0–0.04)
IMM GRANULOCYTES NFR BLD AUTO: 0.4 % (ref 0–0.5)
LDLC SERPL CALC-MCNC: 116 MG/DL (ref 63–159)
LYMPHOCYTES # BLD AUTO: 1.1 K/UL (ref 1–4.8)
LYMPHOCYTES NFR BLD: 19.6 % (ref 18–48)
MCH RBC QN AUTO: 30.8 PG (ref 27–31)
MCHC RBC AUTO-ENTMCNC: 33.1 G/DL (ref 32–36)
MCV RBC AUTO: 93 FL (ref 82–98)
MONOCYTES # BLD AUTO: 0.5 K/UL (ref 0.3–1)
MONOCYTES NFR BLD: 8.8 % (ref 4–15)
NEUTROPHILS # BLD AUTO: 3.7 K/UL (ref 1.8–7.7)
NEUTROPHILS NFR BLD: 64 % (ref 38–73)
NONHDLC SERPL-MCNC: 136 MG/DL
NRBC BLD-RTO: 0 /100 WBC
PLATELET # BLD AUTO: 236 K/UL (ref 150–450)
PMV BLD AUTO: 9 FL (ref 9.2–12.9)
POTASSIUM SERPL-SCNC: 4.6 MMOL/L (ref 3.5–5.1)
PROT SERPL-MCNC: 6.6 G/DL (ref 6–8.4)
RBC # BLD AUTO: 4.51 M/UL (ref 4.6–6.2)
SODIUM SERPL-SCNC: 141 MMOL/L (ref 136–145)
TRIGL SERPL-MCNC: 100 MG/DL (ref 30–150)
WBC # BLD AUTO: 5.71 K/UL (ref 3.9–12.7)

## 2022-09-16 PROCEDURE — 36415 COLL VENOUS BLD VENIPUNCTURE: CPT | Performed by: FAMILY MEDICINE

## 2022-09-16 PROCEDURE — 80061 LIPID PANEL: CPT | Performed by: FAMILY MEDICINE

## 2022-09-16 PROCEDURE — 80053 COMPREHEN METABOLIC PANEL: CPT | Performed by: FAMILY MEDICINE

## 2022-09-16 PROCEDURE — 85025 COMPLETE CBC W/AUTO DIFF WBC: CPT | Performed by: FAMILY MEDICINE

## 2022-09-16 PROCEDURE — 84153 ASSAY OF PSA TOTAL: CPT | Performed by: FAMILY MEDICINE

## 2022-09-16 PROCEDURE — 83036 HEMOGLOBIN GLYCOSYLATED A1C: CPT | Performed by: FAMILY MEDICINE

## 2022-09-19 ENCOUNTER — OFFICE VISIT (OUTPATIENT)
Dept: FAMILY MEDICINE | Facility: CLINIC | Age: 67
End: 2022-09-19
Payer: COMMERCIAL

## 2022-09-19 VITALS
RESPIRATION RATE: 12 BRPM | HEART RATE: 64 BPM | BODY MASS INDEX: 30.99 KG/M2 | HEIGHT: 74 IN | DIASTOLIC BLOOD PRESSURE: 78 MMHG | SYSTOLIC BLOOD PRESSURE: 132 MMHG | WEIGHT: 241.5 LBS

## 2022-09-19 DIAGNOSIS — I10 ESSENTIAL HYPERTENSION: Primary | ICD-10-CM

## 2022-09-19 DIAGNOSIS — E78.2 MIXED HYPERLIPIDEMIA: ICD-10-CM

## 2022-09-19 PROCEDURE — 1101F PT FALLS ASSESS-DOCD LE1/YR: CPT | Mod: CPTII,S$GLB,, | Performed by: FAMILY MEDICINE

## 2022-09-19 PROCEDURE — 99999 PR PBB SHADOW E&M-EST. PATIENT-LVL III: ICD-10-PCS | Mod: PBBFAC,,, | Performed by: FAMILY MEDICINE

## 2022-09-19 PROCEDURE — 3078F PR MOST RECENT DIASTOLIC BLOOD PRESSURE < 80 MM HG: ICD-10-PCS | Mod: CPTII,S$GLB,, | Performed by: FAMILY MEDICINE

## 2022-09-19 PROCEDURE — 1126F AMNT PAIN NOTED NONE PRSNT: CPT | Mod: CPTII,S$GLB,, | Performed by: FAMILY MEDICINE

## 2022-09-19 PROCEDURE — 4010F ACE/ARB THERAPY RXD/TAKEN: CPT | Mod: CPTII,S$GLB,, | Performed by: FAMILY MEDICINE

## 2022-09-19 PROCEDURE — 3008F BODY MASS INDEX DOCD: CPT | Mod: CPTII,S$GLB,, | Performed by: FAMILY MEDICINE

## 2022-09-19 PROCEDURE — 1126F PR PAIN SEVERITY QUANTIFIED, NO PAIN PRESENT: ICD-10-PCS | Mod: CPTII,S$GLB,, | Performed by: FAMILY MEDICINE

## 2022-09-19 PROCEDURE — 1101F PR PT FALLS ASSESS DOC 0-1 FALLS W/OUT INJ PAST YR: ICD-10-PCS | Mod: CPTII,S$GLB,, | Performed by: FAMILY MEDICINE

## 2022-09-19 PROCEDURE — 99214 PR OFFICE/OUTPT VISIT, EST, LEVL IV, 30-39 MIN: ICD-10-PCS | Mod: S$GLB,,, | Performed by: FAMILY MEDICINE

## 2022-09-19 PROCEDURE — 1160F RVW MEDS BY RX/DR IN RCRD: CPT | Mod: CPTII,S$GLB,, | Performed by: FAMILY MEDICINE

## 2022-09-19 PROCEDURE — 3044F HG A1C LEVEL LT 7.0%: CPT | Mod: CPTII,S$GLB,, | Performed by: FAMILY MEDICINE

## 2022-09-19 PROCEDURE — 1159F MED LIST DOCD IN RCRD: CPT | Mod: CPTII,S$GLB,, | Performed by: FAMILY MEDICINE

## 2022-09-19 PROCEDURE — 1160F PR REVIEW ALL MEDS BY PRESCRIBER/CLIN PHARMACIST DOCUMENTED: ICD-10-PCS | Mod: CPTII,S$GLB,, | Performed by: FAMILY MEDICINE

## 2022-09-19 PROCEDURE — 4010F PR ACE/ARB THEARPY RXD/TAKEN: ICD-10-PCS | Mod: CPTII,S$GLB,, | Performed by: FAMILY MEDICINE

## 2022-09-19 PROCEDURE — 3075F PR MOST RECENT SYSTOLIC BLOOD PRESS GE 130-139MM HG: ICD-10-PCS | Mod: CPTII,S$GLB,, | Performed by: FAMILY MEDICINE

## 2022-09-19 PROCEDURE — 3044F PR MOST RECENT HEMOGLOBIN A1C LEVEL <7.0%: ICD-10-PCS | Mod: CPTII,S$GLB,, | Performed by: FAMILY MEDICINE

## 2022-09-19 PROCEDURE — 99999 PR PBB SHADOW E&M-EST. PATIENT-LVL III: CPT | Mod: PBBFAC,,, | Performed by: FAMILY MEDICINE

## 2022-09-19 PROCEDURE — 1159F PR MEDICATION LIST DOCUMENTED IN MEDICAL RECORD: ICD-10-PCS | Mod: CPTII,S$GLB,, | Performed by: FAMILY MEDICINE

## 2022-09-19 PROCEDURE — 99214 OFFICE O/P EST MOD 30 MIN: CPT | Mod: S$GLB,,, | Performed by: FAMILY MEDICINE

## 2022-09-19 PROCEDURE — 3078F DIAST BP <80 MM HG: CPT | Mod: CPTII,S$GLB,, | Performed by: FAMILY MEDICINE

## 2022-09-19 PROCEDURE — 3075F SYST BP GE 130 - 139MM HG: CPT | Mod: CPTII,S$GLB,, | Performed by: FAMILY MEDICINE

## 2022-09-19 PROCEDURE — 3288F FALL RISK ASSESSMENT DOCD: CPT | Mod: CPTII,S$GLB,, | Performed by: FAMILY MEDICINE

## 2022-09-19 PROCEDURE — 3008F PR BODY MASS INDEX (BMI) DOCUMENTED: ICD-10-PCS | Mod: CPTII,S$GLB,, | Performed by: FAMILY MEDICINE

## 2022-09-19 PROCEDURE — 3288F PR FALLS RISK ASSESSMENT DOCUMENTED: ICD-10-PCS | Mod: CPTII,S$GLB,, | Performed by: FAMILY MEDICINE

## 2022-09-19 NOTE — PROGRESS NOTES
Subjective:       Patient ID: Leonardo Pisano is a 67 y.o. male.    Chief Complaint: Follow-up (Pt here for checkup. Just wanting to discuss his most recent labs. )    Patient here for a checkup.  He has fully recovered from COVID-19 pneumonia.     He has a history of hyperlipidemia.  He has a history of myalgias from taking a statin.  He tolerates his blood pressure medication well and is not having side effects such as chronic cough or dizziness.  He is having ED issues  Patient saw cardiologist.   Recommended a statin.  He started Crestor    Review of Systems   Constitutional:  Negative for activity change, chills, fatigue, fever and unexpected weight change.   HENT:  Negative for sore throat and trouble swallowing.    Respiratory:  Negative for cough, chest tightness and shortness of breath.    Cardiovascular:  Negative for chest pain and leg swelling.   Gastrointestinal:  Negative for abdominal pain.   Endocrine: Negative for cold intolerance and heat intolerance.   Genitourinary:  Negative for difficulty urinating.   Musculoskeletal:  Negative for back pain and joint swelling.   Skin:  Negative for rash.   Neurological:  Negative for numbness.   Hematological:  Negative for adenopathy.   Psychiatric/Behavioral:  Negative for decreased concentration.        Objective:      Vitals:    09/19/22 1437   BP: 132/78   Pulse: 64   Resp: 12     Physical Exam  Constitutional:       Appearance: He is well-developed.   HENT:      Head: Normocephalic and atraumatic.      Right Ear: Tympanic membrane, ear canal and external ear normal.      Left Ear: Tympanic membrane, ear canal and external ear normal.      Nose: Nose normal.      Mouth/Throat:      Mouth: Mucous membranes are moist.   Eyes:      Conjunctiva/sclera: Conjunctivae normal.      Pupils: Pupils are equal, round, and reactive to light.   Neck:      Thyroid: No thyromegaly.      Trachea: No tracheal deviation.   Cardiovascular:      Rate and Rhythm: Normal rate  and regular rhythm. No extrasystoles are present.     Chest Wall: PMI is not displaced. No thrill.      Heart sounds: Normal heart sounds. No murmur heard.    No gallop.   Pulmonary:      Effort: Pulmonary effort is normal.      Breath sounds: Normal breath sounds.   Abdominal:      General: Bowel sounds are normal. There is no distension.      Palpations: Abdomen is soft. There is no mass.      Tenderness: There is no abdominal tenderness. There is no guarding or rebound.      Hernia: There is no hernia in the left inguinal area.   Musculoskeletal:         General: Normal range of motion.      Cervical back: Normal range of motion and neck supple.      Right lower leg: No edema.      Left lower leg: No edema.   Skin:     General: Skin is warm and dry.   Neurological:      General: No focal deficit present.      Mental Status: He is alert and oriented to person, place, and time.      Cranial Nerves: No cranial nerve deficit.      Gait: Gait normal.      Deep Tendon Reflexes: Reflexes are normal and symmetric.   Psychiatric:         Mood and Affect: Mood normal.         Behavior: Behavior normal.         Thought Content: Thought content normal.         Judgment: Judgment normal.       Lab Results   Component Value Date    LDLCALC 116.0 09/16/2022     Lab Results   Component Value Date    WBC 5.71 09/16/2022    HGB 13.9 (L) 09/16/2022    HCT 42.0 09/16/2022    MCV 93 09/16/2022     09/16/2022       BMP  Lab Results   Component Value Date     09/16/2022    K 4.6 09/16/2022     09/16/2022    CO2 25 09/16/2022    BUN 21 09/16/2022    CREATININE 0.9 09/16/2022    CALCIUM 9.6 09/16/2022    ANIONGAP 9 09/16/2022    ESTGFRAFRICA >60 03/16/2022    EGFRNONAA >60 03/16/2022       Assessment:       1. Essential hypertension    2. Mixed hyperlipidemia          Plan:   Leonardo was seen today for follow-up.    Diagnoses and all orders for this visit:    COVID-19 virus infection  Recovered     Statin  intolerance/Mixed hyperlipidemia  Continue Crestor.  Monitor for side effects  Diet and exercise recommended    Screen for colon cancer  -     Cologuard Screening was negative.  Repeat 2024    PVC (premature ventricular contraction)  Resolved    Essential hypertension    Mixed hyperlipidemia  -     Comprehensive Metabolic Panel; Future; Expected date: 12/19/2022  -     Lipid Panel; Future; Expected date: 12/19/2022      Return to clinic in 6 months

## 2022-12-05 ENCOUNTER — PATIENT MESSAGE (OUTPATIENT)
Dept: INTERNAL MEDICINE | Facility: CLINIC | Age: 67
End: 2022-12-05
Payer: COMMERCIAL

## 2023-03-06 DIAGNOSIS — I10 ESSENTIAL HYPERTENSION: ICD-10-CM

## 2023-03-07 ENCOUNTER — LAB VISIT (OUTPATIENT)
Dept: LAB | Facility: HOSPITAL | Age: 68
End: 2023-03-07
Attending: STUDENT IN AN ORGANIZED HEALTH CARE EDUCATION/TRAINING PROGRAM
Payer: COMMERCIAL

## 2023-03-07 DIAGNOSIS — E78.5 HYPERLIPIDEMIA, UNSPECIFIED HYPERLIPIDEMIA TYPE: ICD-10-CM

## 2023-03-07 LAB
CHOLEST SERPL-MCNC: 144 MG/DL (ref 120–199)
CHOLEST/HDLC SERPL: 2.7 {RATIO} (ref 2–5)
HDLC SERPL-MCNC: 54 MG/DL (ref 40–75)
HDLC SERPL: 37.5 % (ref 20–50)
LDLC SERPL CALC-MCNC: 71.6 MG/DL (ref 63–159)
NONHDLC SERPL-MCNC: 90 MG/DL
TRIGL SERPL-MCNC: 92 MG/DL (ref 30–150)

## 2023-03-07 PROCEDURE — 80061 LIPID PANEL: CPT | Performed by: STUDENT IN AN ORGANIZED HEALTH CARE EDUCATION/TRAINING PROGRAM

## 2023-03-07 PROCEDURE — 36415 COLL VENOUS BLD VENIPUNCTURE: CPT | Performed by: STUDENT IN AN ORGANIZED HEALTH CARE EDUCATION/TRAINING PROGRAM

## 2023-03-07 RX ORDER — LISINOPRIL 10 MG/1
TABLET ORAL
Qty: 90 TABLET | Refills: 1 | Status: SHIPPED | OUTPATIENT
Start: 2023-03-07 | End: 2023-09-03

## 2023-03-07 NOTE — TELEPHONE ENCOUNTER
Refill Decision Note   Leonardo Pisano  is requesting a refill authorization.  Brief Assessment and Rationale for Refill:  Approve     Medication Therapy Plan:       Medication Reconciliation Completed: No   Comments:     No Care Gaps recommended.     Note composed:10:13 AM 03/07/2023

## 2023-03-07 NOTE — TELEPHONE ENCOUNTER
No new care gaps identified.  [facility] Embedded Care Gaps. Reference number: [id number]. 3/06/2023   7:39:16 PM CST

## 2023-03-13 ENCOUNTER — OFFICE VISIT (OUTPATIENT)
Dept: FAMILY MEDICINE | Facility: CLINIC | Age: 68
End: 2023-03-13
Payer: COMMERCIAL

## 2023-03-13 VITALS
DIASTOLIC BLOOD PRESSURE: 62 MMHG | WEIGHT: 249 LBS | HEART RATE: 68 BPM | RESPIRATION RATE: 12 BRPM | SYSTOLIC BLOOD PRESSURE: 116 MMHG | BODY MASS INDEX: 31.95 KG/M2 | HEIGHT: 74 IN

## 2023-03-13 DIAGNOSIS — I10 ESSENTIAL HYPERTENSION: Primary | ICD-10-CM

## 2023-03-13 DIAGNOSIS — Z23 IMMUNIZATION DUE: ICD-10-CM

## 2023-03-13 DIAGNOSIS — E78.2 MIXED HYPERLIPIDEMIA: ICD-10-CM

## 2023-03-13 PROCEDURE — 3288F FALL RISK ASSESSMENT DOCD: CPT | Mod: CPTII,S$GLB,, | Performed by: FAMILY MEDICINE

## 2023-03-13 PROCEDURE — 1126F PR PAIN SEVERITY QUANTIFIED, NO PAIN PRESENT: ICD-10-PCS | Mod: CPTII,S$GLB,, | Performed by: FAMILY MEDICINE

## 2023-03-13 PROCEDURE — 1160F RVW MEDS BY RX/DR IN RCRD: CPT | Mod: CPTII,S$GLB,, | Performed by: FAMILY MEDICINE

## 2023-03-13 PROCEDURE — 90677 PCV20 VACCINE IM: CPT | Mod: S$GLB,,, | Performed by: FAMILY MEDICINE

## 2023-03-13 PROCEDURE — 99214 PR OFFICE/OUTPT VISIT, EST, LEVL IV, 30-39 MIN: ICD-10-PCS | Mod: 25,S$GLB,, | Performed by: FAMILY MEDICINE

## 2023-03-13 PROCEDURE — 1159F PR MEDICATION LIST DOCUMENTED IN MEDICAL RECORD: ICD-10-PCS | Mod: CPTII,S$GLB,, | Performed by: FAMILY MEDICINE

## 2023-03-13 PROCEDURE — 1101F PR PT FALLS ASSESS DOC 0-1 FALLS W/OUT INJ PAST YR: ICD-10-PCS | Mod: CPTII,S$GLB,, | Performed by: FAMILY MEDICINE

## 2023-03-13 PROCEDURE — 3008F PR BODY MASS INDEX (BMI) DOCUMENTED: ICD-10-PCS | Mod: CPTII,S$GLB,, | Performed by: FAMILY MEDICINE

## 2023-03-13 PROCEDURE — 90677 PNEUMOCOCCAL CONJUGATE VACCINE 20-VALENT: ICD-10-PCS | Mod: S$GLB,,, | Performed by: FAMILY MEDICINE

## 2023-03-13 PROCEDURE — 4010F ACE/ARB THERAPY RXD/TAKEN: CPT | Mod: CPTII,S$GLB,, | Performed by: FAMILY MEDICINE

## 2023-03-13 PROCEDURE — 4010F PR ACE/ARB THEARPY RXD/TAKEN: ICD-10-PCS | Mod: CPTII,S$GLB,, | Performed by: FAMILY MEDICINE

## 2023-03-13 PROCEDURE — 3288F PR FALLS RISK ASSESSMENT DOCUMENTED: ICD-10-PCS | Mod: CPTII,S$GLB,, | Performed by: FAMILY MEDICINE

## 2023-03-13 PROCEDURE — 3008F BODY MASS INDEX DOCD: CPT | Mod: CPTII,S$GLB,, | Performed by: FAMILY MEDICINE

## 2023-03-13 PROCEDURE — 99999 PR PBB SHADOW E&M-EST. PATIENT-LVL III: CPT | Mod: PBBFAC,,, | Performed by: FAMILY MEDICINE

## 2023-03-13 PROCEDURE — 3078F DIAST BP <80 MM HG: CPT | Mod: CPTII,S$GLB,, | Performed by: FAMILY MEDICINE

## 2023-03-13 PROCEDURE — 90471 PNEUMOCOCCAL CONJUGATE VACCINE 20-VALENT: ICD-10-PCS | Mod: S$GLB,,, | Performed by: FAMILY MEDICINE

## 2023-03-13 PROCEDURE — 99214 OFFICE O/P EST MOD 30 MIN: CPT | Mod: 25,S$GLB,, | Performed by: FAMILY MEDICINE

## 2023-03-13 PROCEDURE — 90471 IMMUNIZATION ADMIN: CPT | Mod: S$GLB,,, | Performed by: FAMILY MEDICINE

## 2023-03-13 PROCEDURE — 99999 PR PBB SHADOW E&M-EST. PATIENT-LVL III: ICD-10-PCS | Mod: PBBFAC,,, | Performed by: FAMILY MEDICINE

## 2023-03-13 PROCEDURE — 1159F MED LIST DOCD IN RCRD: CPT | Mod: CPTII,S$GLB,, | Performed by: FAMILY MEDICINE

## 2023-03-13 PROCEDURE — 1101F PT FALLS ASSESS-DOCD LE1/YR: CPT | Mod: CPTII,S$GLB,, | Performed by: FAMILY MEDICINE

## 2023-03-13 PROCEDURE — 1160F PR REVIEW ALL MEDS BY PRESCRIBER/CLIN PHARMACIST DOCUMENTED: ICD-10-PCS | Mod: CPTII,S$GLB,, | Performed by: FAMILY MEDICINE

## 2023-03-13 PROCEDURE — 3078F PR MOST RECENT DIASTOLIC BLOOD PRESSURE < 80 MM HG: ICD-10-PCS | Mod: CPTII,S$GLB,, | Performed by: FAMILY MEDICINE

## 2023-03-13 PROCEDURE — 3074F SYST BP LT 130 MM HG: CPT | Mod: CPTII,S$GLB,, | Performed by: FAMILY MEDICINE

## 2023-03-13 PROCEDURE — 3074F PR MOST RECENT SYSTOLIC BLOOD PRESSURE < 130 MM HG: ICD-10-PCS | Mod: CPTII,S$GLB,, | Performed by: FAMILY MEDICINE

## 2023-03-13 PROCEDURE — 1126F AMNT PAIN NOTED NONE PRSNT: CPT | Mod: CPTII,S$GLB,, | Performed by: FAMILY MEDICINE

## 2023-03-13 NOTE — PROGRESS NOTES
Subjective:       Patient ID: Leonardo Pisano is a 67 y.o. male.    Chief Complaint: Follow-up (6 month follow up)    66 y/o male presents to clinic for HTN, dyslipidemia checkup  He has a history of hyperlipidemia.  He has a history of myalgias from taking a statin.  He tolerates his blood pressure medication well and is not having side effects such as chronic cough or dizziness.  He is having ED issues  Patient saw cardiologist.   Recommended a statin.  He started Crestor    Follow-up  Pertinent negatives include no chest pain, chills, congestion, fever, nausea, sore throat or vomiting.   Review of Systems   Constitutional:  Negative for chills and fever.   HENT: Negative.  Negative for nasal congestion, rhinorrhea and sore throat.    Eyes: Negative.    Respiratory:  Negative for shortness of breath.    Cardiovascular:  Negative for chest pain.   Gastrointestinal:  Negative for diarrhea, nausea and vomiting.   Endocrine: Negative.    Genitourinary: Negative.    Musculoskeletal: Negative.    Integumentary:  Negative.   Allergic/Immunologic: Negative.    Neurological: Negative.  Negative for dizziness and light-headedness.   Hematological: Negative.    Psychiatric/Behavioral: Negative.         Objective:      Vitals:    03/13/23 1001   BP: 116/62   Pulse: 68   Resp: 12      Lab Results   Component Value Date    WBC 5.71 09/16/2022    HGB 13.9 (L) 09/16/2022    HCT 42.0 09/16/2022    MCV 93 09/16/2022     09/16/2022     Physical Exam  Constitutional:       Appearance: Normal appearance.   HENT:      Head: Normocephalic and atraumatic.      Right Ear: Tympanic membrane, ear canal and external ear normal.      Left Ear: Tympanic membrane, ear canal and external ear normal.      Nose: Nose normal.      Mouth/Throat:      Mouth: Mucous membranes are moist.   Eyes:      Conjunctiva/sclera: Conjunctivae normal.   Cardiovascular:      Rate and Rhythm: Normal rate and regular rhythm.      Pulses: Normal pulses.       Heart sounds: Normal heart sounds.   Pulmonary:      Effort: Pulmonary effort is normal.      Breath sounds: Normal breath sounds.   Abdominal:      General: Abdomen is flat.      Palpations: Abdomen is soft.   Musculoskeletal:         General: Normal range of motion.      Cervical back: Normal range of motion and neck supple.   Skin:     General: Skin is warm and dry.   Neurological:      General: No focal deficit present.      Mental Status: He is alert and oriented to person, place, and time.   Psychiatric:         Behavior: Behavior normal.         Judgment: Judgment normal.       Assessment:       Problem List Items Addressed This Visit          Cardiac/Vascular    Essential hypertension - Primary     Two gram sodium diet.    Weight loss discussed.    Try to walk 2 miles per day.    Quit smoking.    Current medications will be:  Lisinopril 10 mg daily         Relevant Orders    Comprehensive Metabolic Panel    Lipid Panel    Mixed hyperlipidemia     Low fat diet.  Avoid sweets.  A 10 pound weight loss by the next visit as a  good goal.  Increase consumption of fruits and vegetables, fish and chicken.  Use medications below:  Crestor 20 mg p.o. daily         Relevant Orders    Comprehensive Metabolic Panel    Lipid Panel     Other Visit Diagnoses       Immunization due        Relevant Orders    (In Office Administered) Pneumococcal Conjugate Vaccine (20 Valent) (IM) (Completed)            Plan:       1. Essential hypertension  Assessment & Plan:  Two gram sodium diet.    Weight loss discussed.    Try to walk 2 miles per day.    Quit smoking.    Current medications will be:  Lisinopril 10 mg daily    Orders:  -     Comprehensive Metabolic Panel; Future; Expected date: 06/13/2023  -     Lipid Panel; Future; Expected date: 06/13/2023    2. Mixed hyperlipidemia  Overview:  Continue Crestor    Assessment & Plan:  Low fat diet.  Avoid sweets.  A 10 pound weight loss by the next visit as a  good goal.  Increase  consumption of fruits and vegetables, fish and chicken.  Use medications below:  Crestor 20 mg p.o. daily    Orders:  -     Comprehensive Metabolic Panel; Future; Expected date: 06/13/2023  -     Lipid Panel; Future; Expected date: 06/13/2023    3. Immunization due  -     (In Office Administered) Pneumococcal Conjugate Vaccine (20 Valent) (IM)            MARILIA Jett

## 2023-03-14 PROBLEM — E78.2 MIXED HYPERLIPIDEMIA: Status: ACTIVE | Noted: 2018-10-26

## 2023-03-14 NOTE — ASSESSMENT & PLAN NOTE
Two gram sodium diet.    Weight loss discussed.    Try to walk 2 miles per day.    Quit smoking.    Current medications will be:  Lisinopril 10 mg daily

## 2023-03-14 NOTE — ASSESSMENT & PLAN NOTE
Low fat diet.  Avoid sweets.  A 10 pound weight loss by the next visit as a  good goal.  Increase consumption of fruits and vegetables, fish and chicken.  Use medications below:  Crestor 20 mg p.o. daily

## 2023-08-31 ENCOUNTER — LAB VISIT (OUTPATIENT)
Dept: LAB | Facility: HOSPITAL | Age: 68
End: 2023-08-31
Attending: FAMILY MEDICINE
Payer: MEDICARE

## 2023-08-31 DIAGNOSIS — E78.2 MIXED HYPERLIPIDEMIA: ICD-10-CM

## 2023-08-31 DIAGNOSIS — I10 ESSENTIAL HYPERTENSION: ICD-10-CM

## 2023-08-31 LAB
ALBUMIN SERPL BCP-MCNC: 3.8 G/DL (ref 3.5–5.2)
ALP SERPL-CCNC: 119 U/L (ref 55–135)
ALT SERPL W/O P-5'-P-CCNC: 186 U/L (ref 10–44)
ANION GAP SERPL CALC-SCNC: 8 MMOL/L (ref 8–16)
AST SERPL-CCNC: 75 U/L (ref 10–40)
BILIRUB SERPL-MCNC: 0.5 MG/DL (ref 0.1–1)
BUN SERPL-MCNC: 18 MG/DL (ref 8–23)
CALCIUM SERPL-MCNC: 9.2 MG/DL (ref 8.7–10.5)
CHLORIDE SERPL-SCNC: 108 MMOL/L (ref 95–110)
CHOLEST SERPL-MCNC: 144 MG/DL (ref 120–199)
CHOLEST/HDLC SERPL: 2.5 {RATIO} (ref 2–5)
CO2 SERPL-SCNC: 26 MMOL/L (ref 23–29)
CREAT SERPL-MCNC: 0.9 MG/DL (ref 0.5–1.4)
EST. GFR  (NO RACE VARIABLE): >60 ML/MIN/1.73 M^2
GLUCOSE SERPL-MCNC: 112 MG/DL (ref 70–110)
HDLC SERPL-MCNC: 57 MG/DL (ref 40–75)
HDLC SERPL: 39.6 % (ref 20–50)
LDLC SERPL CALC-MCNC: 70.2 MG/DL (ref 63–159)
NONHDLC SERPL-MCNC: 87 MG/DL
POTASSIUM SERPL-SCNC: 4.6 MMOL/L (ref 3.5–5.1)
PROT SERPL-MCNC: 6.5 G/DL (ref 6–8.4)
SODIUM SERPL-SCNC: 142 MMOL/L (ref 136–145)
TRIGL SERPL-MCNC: 84 MG/DL (ref 30–150)

## 2023-08-31 PROCEDURE — 36415 COLL VENOUS BLD VENIPUNCTURE: CPT | Performed by: FAMILY MEDICINE

## 2023-08-31 PROCEDURE — 80053 COMPREHEN METABOLIC PANEL: CPT | Performed by: FAMILY MEDICINE

## 2023-08-31 PROCEDURE — 80061 LIPID PANEL: CPT | Performed by: FAMILY MEDICINE

## 2023-09-02 DIAGNOSIS — I10 ESSENTIAL HYPERTENSION: ICD-10-CM

## 2023-09-02 NOTE — TELEPHONE ENCOUNTER
No care due was identified.  [facility] Embedded Care Due Messages. Reference number: [id number].   9/02/2023 12:35:54 AM CDT

## 2023-09-03 RX ORDER — LISINOPRIL 10 MG/1
TABLET ORAL
Qty: 90 TABLET | Refills: 1 | Status: SHIPPED | OUTPATIENT
Start: 2023-09-03 | End: 2023-12-11 | Stop reason: SDUPTHER

## 2023-09-03 NOTE — TELEPHONE ENCOUNTER
Refill Decision Note   Leonardo Pisano  is requesting a refill authorization.  Brief Assessment and Rationale for Refill:  Approve     Medication Therapy Plan:         Comments:     Note composed:1:26 AM 09/03/2023

## 2023-09-11 ENCOUNTER — OFFICE VISIT (OUTPATIENT)
Dept: FAMILY MEDICINE | Facility: CLINIC | Age: 68
End: 2023-09-11
Payer: MEDICARE

## 2023-09-11 VITALS
HEART RATE: 56 BPM | WEIGHT: 248.56 LBS | BODY MASS INDEX: 31.9 KG/M2 | RESPIRATION RATE: 18 BRPM | HEIGHT: 74 IN | OXYGEN SATURATION: 97 % | DIASTOLIC BLOOD PRESSURE: 60 MMHG | SYSTOLIC BLOOD PRESSURE: 118 MMHG

## 2023-09-11 DIAGNOSIS — I10 ESSENTIAL HYPERTENSION: ICD-10-CM

## 2023-09-11 DIAGNOSIS — R79.89 ELEVATED LFTS: Primary | ICD-10-CM

## 2023-09-11 DIAGNOSIS — E78.2 MIXED HYPERLIPIDEMIA: ICD-10-CM

## 2023-09-11 PROCEDURE — 3074F PR MOST RECENT SYSTOLIC BLOOD PRESSURE < 130 MM HG: ICD-10-PCS | Mod: CPTII,S$GLB,, | Performed by: FAMILY MEDICINE

## 2023-09-11 PROCEDURE — 3288F PR FALLS RISK ASSESSMENT DOCUMENTED: ICD-10-PCS | Mod: CPTII,S$GLB,, | Performed by: FAMILY MEDICINE

## 2023-09-11 PROCEDURE — 3078F DIAST BP <80 MM HG: CPT | Mod: CPTII,S$GLB,, | Performed by: FAMILY MEDICINE

## 2023-09-11 PROCEDURE — 99999 PR PBB SHADOW E&M-EST. PATIENT-LVL III: ICD-10-PCS | Mod: PBBFAC,,, | Performed by: FAMILY MEDICINE

## 2023-09-11 PROCEDURE — 99214 OFFICE O/P EST MOD 30 MIN: CPT | Mod: S$GLB,,, | Performed by: FAMILY MEDICINE

## 2023-09-11 PROCEDURE — 1101F PT FALLS ASSESS-DOCD LE1/YR: CPT | Mod: CPTII,S$GLB,, | Performed by: FAMILY MEDICINE

## 2023-09-11 PROCEDURE — 99214 PR OFFICE/OUTPT VISIT, EST, LEVL IV, 30-39 MIN: ICD-10-PCS | Mod: S$GLB,,, | Performed by: FAMILY MEDICINE

## 2023-09-11 PROCEDURE — 1101F PR PT FALLS ASSESS DOC 0-1 FALLS W/OUT INJ PAST YR: ICD-10-PCS | Mod: CPTII,S$GLB,, | Performed by: FAMILY MEDICINE

## 2023-09-11 PROCEDURE — 1160F PR REVIEW ALL MEDS BY PRESCRIBER/CLIN PHARMACIST DOCUMENTED: ICD-10-PCS | Mod: CPTII,S$GLB,, | Performed by: FAMILY MEDICINE

## 2023-09-11 PROCEDURE — 99999 PR PBB SHADOW E&M-EST. PATIENT-LVL III: CPT | Mod: PBBFAC,,, | Performed by: FAMILY MEDICINE

## 2023-09-11 PROCEDURE — 3288F FALL RISK ASSESSMENT DOCD: CPT | Mod: CPTII,S$GLB,, | Performed by: FAMILY MEDICINE

## 2023-09-11 PROCEDURE — 3008F PR BODY MASS INDEX (BMI) DOCUMENTED: ICD-10-PCS | Mod: CPTII,S$GLB,, | Performed by: FAMILY MEDICINE

## 2023-09-11 PROCEDURE — 3074F SYST BP LT 130 MM HG: CPT | Mod: CPTII,S$GLB,, | Performed by: FAMILY MEDICINE

## 2023-09-11 PROCEDURE — 3008F BODY MASS INDEX DOCD: CPT | Mod: CPTII,S$GLB,, | Performed by: FAMILY MEDICINE

## 2023-09-11 PROCEDURE — 1126F PR PAIN SEVERITY QUANTIFIED, NO PAIN PRESENT: ICD-10-PCS | Mod: CPTII,S$GLB,, | Performed by: FAMILY MEDICINE

## 2023-09-11 PROCEDURE — 4010F ACE/ARB THERAPY RXD/TAKEN: CPT | Mod: CPTII,S$GLB,, | Performed by: FAMILY MEDICINE

## 2023-09-11 PROCEDURE — 3078F PR MOST RECENT DIASTOLIC BLOOD PRESSURE < 80 MM HG: ICD-10-PCS | Mod: CPTII,S$GLB,, | Performed by: FAMILY MEDICINE

## 2023-09-11 PROCEDURE — 4010F PR ACE/ARB THEARPY RXD/TAKEN: ICD-10-PCS | Mod: CPTII,S$GLB,, | Performed by: FAMILY MEDICINE

## 2023-09-11 PROCEDURE — 1126F AMNT PAIN NOTED NONE PRSNT: CPT | Mod: CPTII,S$GLB,, | Performed by: FAMILY MEDICINE

## 2023-09-11 PROCEDURE — 1159F PR MEDICATION LIST DOCUMENTED IN MEDICAL RECORD: ICD-10-PCS | Mod: CPTII,S$GLB,, | Performed by: FAMILY MEDICINE

## 2023-09-11 PROCEDURE — 1160F RVW MEDS BY RX/DR IN RCRD: CPT | Mod: CPTII,S$GLB,, | Performed by: FAMILY MEDICINE

## 2023-09-11 PROCEDURE — 1159F MED LIST DOCD IN RCRD: CPT | Mod: CPTII,S$GLB,, | Performed by: FAMILY MEDICINE

## 2023-09-11 NOTE — PROGRESS NOTES
Subjective:       Patient ID: Leonardo Pisano is a 68 y.o. male.    Chief Complaint: Follow-up (Follow up, discuss blood work results)    67 y/o male presents to clinic for HTN, dyslipidemia checkup  He has a history of hyperlipidemia.  He has a history of myalgias from taking a statin.  Unfortunately, his liver enzymes are mildly elevated.  He is a history of a fatty liver.  He is worried that the statin could be causing this.  He tolerates his blood pressure medication well and is not having side effects such as chronic cough or dizziness.  He is having ED issues  Patient saw cardiologist.   Recommended a statin.  He started Crestor.  Now LFT's elevated.    Follow-up  Pertinent negatives include no chest pain, chills, congestion, fever, nausea, sore throat or vomiting.     Review of Systems   Constitutional:  Negative for chills and fever.   HENT: Negative.  Negative for nasal congestion, rhinorrhea and sore throat.    Eyes: Negative.    Respiratory:  Negative for shortness of breath.    Cardiovascular:  Negative for chest pain.   Gastrointestinal:  Negative for diarrhea, nausea and vomiting.   Endocrine: Negative.    Genitourinary: Negative.    Musculoskeletal: Negative.    Integumentary:  Negative.   Allergic/Immunologic: Negative.    Neurological: Negative.  Negative for dizziness and light-headedness.   Hematological: Negative.    Psychiatric/Behavioral: Negative.           Objective:      Vitals:    09/11/23 0948   BP: 118/60   Pulse: (!) 56   Resp: 18        Physical Exam  Constitutional:       Appearance: Normal appearance.   HENT:      Head: Normocephalic and atraumatic.      Right Ear: Tympanic membrane, ear canal and external ear normal.      Left Ear: Tympanic membrane, ear canal and external ear normal.      Nose: Nose normal.      Mouth/Throat:      Mouth: Mucous membranes are moist.   Eyes:      Conjunctiva/sclera: Conjunctivae normal.   Cardiovascular:      Rate and Rhythm: Normal rate and regular  rhythm.      Pulses: Normal pulses.      Heart sounds: Normal heart sounds.   Pulmonary:      Effort: Pulmonary effort is normal.      Breath sounds: Normal breath sounds.   Abdominal:      General: Abdomen is flat.      Palpations: Abdomen is soft.   Musculoskeletal:         General: Normal range of motion.      Cervical back: Normal range of motion and neck supple.   Skin:     General: Skin is warm and dry.   Neurological:      General: No focal deficit present.      Mental Status: He is alert and oriented to person, place, and time.   Psychiatric:         Mood and Affect: Mood normal.         Behavior: Behavior normal.         Judgment: Judgment normal.         BMP  Lab Results   Component Value Date     08/31/2023    K 4.6 08/31/2023     08/31/2023    CO2 26 08/31/2023    BUN 18 08/31/2023    CREATININE 0.9 08/31/2023    CALCIUM 9.2 08/31/2023    ANIONGAP 8 08/31/2023    EGFRNORACEVR >60 08/31/2023     Lab Results   Component Value Date    LDLCALC 70.2 08/31/2023       Assessment:       Problem List Items Addressed This Visit          Cardiac/Vascular    Essential hypertension    Mixed hyperlipidemia       GI    Elevated LFTs - Primary    Relevant Orders    COMPREHENSIVE METABOLIC PANEL    Lipid Panel       Plan:       1. Elevated LFTs  Overview:  Stop Crestor  Repeat LFT's in 3 months.  If still elevated, get US  Avoid ETOH    Orders:  -     COMPREHENSIVE METABOLIC PANEL; Future; Expected date: 10/11/2023  -     Lipid Panel; Future; Expected date: 10/11/2023    2. Essential hypertension  Overview:  Two gram sodium diet.    Weight loss discussed.    Try to walk 2 miles per day.    Avoid smoking.    Current medications will be:  Lisinopril 10 mg daily      3. Mixed hyperlipidemia  Overview:  Low fat diet.  Avoid sweets.  A 10 pound weight loss by the next visit as a  good goal.  Increase consumption of fruits and vegetables, fish and chicken.  Use medications below:  None for now            RTC in 3  months

## 2023-11-13 ENCOUNTER — PATIENT OUTREACH (OUTPATIENT)
Dept: ADMINISTRATIVE | Facility: HOSPITAL | Age: 68
End: 2023-11-13
Payer: MEDICARE

## 2023-11-13 NOTE — PROGRESS NOTES
[facility] eAWV Gap Report.  Chart reviewed, immunization record updated.  No new results noted on [facility] or [facility] web site.  Care Everywhere updated.   Patient care coordination note  Upcoming PCP visit updated.  Next PCP visit 12/11/2023 and lab visit on 12/4/2023.  Spoke to patient, scheduled eAWV for 12/4/2023 with Maci Dyer NP. Patient states he has my [facility] portal and can see appointment details.

## 2023-12-04 ENCOUNTER — LAB VISIT (OUTPATIENT)
Dept: LAB | Facility: HOSPITAL | Age: 68
End: 2023-12-04
Attending: FAMILY MEDICINE
Payer: MEDICARE

## 2023-12-04 DIAGNOSIS — R79.89 ELEVATED LFTS: ICD-10-CM

## 2023-12-04 LAB
ALBUMIN SERPL BCP-MCNC: 3.8 G/DL (ref 3.5–5.2)
ALP SERPL-CCNC: 102 U/L (ref 55–135)
ALT SERPL W/O P-5'-P-CCNC: 64 U/L (ref 10–44)
ANION GAP SERPL CALC-SCNC: 8 MMOL/L (ref 8–16)
AST SERPL-CCNC: 45 U/L (ref 10–40)
BILIRUB SERPL-MCNC: 0.6 MG/DL (ref 0.1–1)
BUN SERPL-MCNC: 22 MG/DL (ref 8–23)
CALCIUM SERPL-MCNC: 9.8 MG/DL (ref 8.7–10.5)
CHLORIDE SERPL-SCNC: 105 MMOL/L (ref 95–110)
CHOLEST SERPL-MCNC: 227 MG/DL (ref 120–199)
CHOLEST/HDLC SERPL: 3.8 {RATIO} (ref 2–5)
CO2 SERPL-SCNC: 27 MMOL/L (ref 23–29)
CREAT SERPL-MCNC: 1 MG/DL (ref 0.5–1.4)
EST. GFR  (NO RACE VARIABLE): >60 ML/MIN/1.73 M^2
GLUCOSE SERPL-MCNC: 115 MG/DL (ref 70–110)
HDLC SERPL-MCNC: 59 MG/DL (ref 40–75)
HDLC SERPL: 26 % (ref 20–50)
LDLC SERPL CALC-MCNC: 147.8 MG/DL (ref 63–159)
NONHDLC SERPL-MCNC: 168 MG/DL
POTASSIUM SERPL-SCNC: 5 MMOL/L (ref 3.5–5.1)
PROT SERPL-MCNC: 7.1 G/DL (ref 6–8.4)
SODIUM SERPL-SCNC: 140 MMOL/L (ref 136–145)
TRIGL SERPL-MCNC: 101 MG/DL (ref 30–150)

## 2023-12-04 PROCEDURE — 36415 COLL VENOUS BLD VENIPUNCTURE: CPT | Performed by: FAMILY MEDICINE

## 2023-12-04 PROCEDURE — 80053 COMPREHEN METABOLIC PANEL: CPT | Performed by: FAMILY MEDICINE

## 2023-12-04 PROCEDURE — 80061 LIPID PANEL: CPT | Performed by: FAMILY MEDICINE

## 2023-12-11 ENCOUNTER — OFFICE VISIT (OUTPATIENT)
Dept: FAMILY MEDICINE | Facility: CLINIC | Age: 68
End: 2023-12-11
Payer: MEDICARE

## 2023-12-11 VITALS
HEART RATE: 68 BPM | DIASTOLIC BLOOD PRESSURE: 70 MMHG | SYSTOLIC BLOOD PRESSURE: 136 MMHG | HEIGHT: 74 IN | RESPIRATION RATE: 16 BRPM | WEIGHT: 249 LBS | BODY MASS INDEX: 31.95 KG/M2

## 2023-12-11 DIAGNOSIS — R79.89 ELEVATED LFTS: ICD-10-CM

## 2023-12-11 DIAGNOSIS — I10 ESSENTIAL HYPERTENSION: Primary | ICD-10-CM

## 2023-12-11 DIAGNOSIS — Z12.5 SCREENING FOR PROSTATE CANCER: ICD-10-CM

## 2023-12-11 DIAGNOSIS — E78.2 MIXED HYPERLIPIDEMIA: ICD-10-CM

## 2023-12-11 PROCEDURE — 3008F PR BODY MASS INDEX (BMI) DOCUMENTED: ICD-10-PCS | Mod: CPTII,S$GLB,, | Performed by: FAMILY MEDICINE

## 2023-12-11 PROCEDURE — 1160F RVW MEDS BY RX/DR IN RCRD: CPT | Mod: CPTII,S$GLB,, | Performed by: FAMILY MEDICINE

## 2023-12-11 PROCEDURE — 99214 OFFICE O/P EST MOD 30 MIN: CPT | Mod: S$GLB,,, | Performed by: FAMILY MEDICINE

## 2023-12-11 PROCEDURE — 99999 PR PBB SHADOW E&M-EST. PATIENT-LVL III: CPT | Mod: PBBFAC,,, | Performed by: FAMILY MEDICINE

## 2023-12-11 PROCEDURE — 1126F PR PAIN SEVERITY QUANTIFIED, NO PAIN PRESENT: ICD-10-PCS | Mod: CPTII,S$GLB,, | Performed by: FAMILY MEDICINE

## 2023-12-11 PROCEDURE — 3078F PR MOST RECENT DIASTOLIC BLOOD PRESSURE < 80 MM HG: ICD-10-PCS | Mod: CPTII,S$GLB,, | Performed by: FAMILY MEDICINE

## 2023-12-11 PROCEDURE — 4010F ACE/ARB THERAPY RXD/TAKEN: CPT | Mod: CPTII,S$GLB,, | Performed by: FAMILY MEDICINE

## 2023-12-11 PROCEDURE — 3075F SYST BP GE 130 - 139MM HG: CPT | Mod: CPTII,S$GLB,, | Performed by: FAMILY MEDICINE

## 2023-12-11 PROCEDURE — 99999 PR PBB SHADOW E&M-EST. PATIENT-LVL III: ICD-10-PCS | Mod: PBBFAC,,, | Performed by: FAMILY MEDICINE

## 2023-12-11 PROCEDURE — 1101F PT FALLS ASSESS-DOCD LE1/YR: CPT | Mod: CPTII,S$GLB,, | Performed by: FAMILY MEDICINE

## 2023-12-11 PROCEDURE — 4010F PR ACE/ARB THEARPY RXD/TAKEN: ICD-10-PCS | Mod: CPTII,S$GLB,, | Performed by: FAMILY MEDICINE

## 2023-12-11 PROCEDURE — 1126F AMNT PAIN NOTED NONE PRSNT: CPT | Mod: CPTII,S$GLB,, | Performed by: FAMILY MEDICINE

## 2023-12-11 PROCEDURE — 1101F PR PT FALLS ASSESS DOC 0-1 FALLS W/OUT INJ PAST YR: ICD-10-PCS | Mod: CPTII,S$GLB,, | Performed by: FAMILY MEDICINE

## 2023-12-11 PROCEDURE — 3078F DIAST BP <80 MM HG: CPT | Mod: CPTII,S$GLB,, | Performed by: FAMILY MEDICINE

## 2023-12-11 PROCEDURE — 3075F PR MOST RECENT SYSTOLIC BLOOD PRESS GE 130-139MM HG: ICD-10-PCS | Mod: CPTII,S$GLB,, | Performed by: FAMILY MEDICINE

## 2023-12-11 PROCEDURE — 1159F MED LIST DOCD IN RCRD: CPT | Mod: CPTII,S$GLB,, | Performed by: FAMILY MEDICINE

## 2023-12-11 PROCEDURE — 1159F PR MEDICATION LIST DOCUMENTED IN MEDICAL RECORD: ICD-10-PCS | Mod: CPTII,S$GLB,, | Performed by: FAMILY MEDICINE

## 2023-12-11 PROCEDURE — 3288F PR FALLS RISK ASSESSMENT DOCUMENTED: ICD-10-PCS | Mod: CPTII,S$GLB,, | Performed by: FAMILY MEDICINE

## 2023-12-11 PROCEDURE — 99214 PR OFFICE/OUTPT VISIT, EST, LEVL IV, 30-39 MIN: ICD-10-PCS | Mod: S$GLB,,, | Performed by: FAMILY MEDICINE

## 2023-12-11 PROCEDURE — 3008F BODY MASS INDEX DOCD: CPT | Mod: CPTII,S$GLB,, | Performed by: FAMILY MEDICINE

## 2023-12-11 PROCEDURE — 3288F FALL RISK ASSESSMENT DOCD: CPT | Mod: CPTII,S$GLB,, | Performed by: FAMILY MEDICINE

## 2023-12-11 PROCEDURE — 1160F PR REVIEW ALL MEDS BY PRESCRIBER/CLIN PHARMACIST DOCUMENTED: ICD-10-PCS | Mod: CPTII,S$GLB,, | Performed by: FAMILY MEDICINE

## 2023-12-11 RX ORDER — LISINOPRIL 10 MG/1
10 TABLET ORAL DAILY
Qty: 90 TABLET | Refills: 1 | Status: SHIPPED | OUTPATIENT
Start: 2023-12-11

## 2023-12-11 NOTE — PROGRESS NOTES
Subjective:       Patient ID: Leonardo Pisano is a 68 y.o. male.    Chief Complaint: Follow-up (6 month follow up)    69 y/o male presents to clinic for HTN, dyslipidemia checkup  He has a history of hyperlipidemia.  He has a history of myalgias from taking a statin.  Unfortunately, his liver enzymes are mildly elevated.  He is a history of a fatty liver.  He is worried that the statin could be causing this.  He tolerates his blood pressure medication well and is not having side effects such as chronic cough or dizziness.  He is having ED issues  Patient saw cardiologist.   Recommended a statin.  He started Crestor which increased his LFTs.  Crestor.  But is LFTs decreased a small amount.      Review of Systems   Constitutional:  Negative for chills and fever.   HENT: Negative.  Negative for nasal congestion, rhinorrhea and sore throat.    Eyes: Negative.    Respiratory:  Negative for shortness of breath.    Cardiovascular:  Negative for chest pain.   Gastrointestinal:  Negative for diarrhea, nausea and vomiting.   Endocrine: Negative.    Genitourinary: Negative.    Musculoskeletal:  Positive for arthralgias, back pain and neck pain.   Integumentary:  Negative.   Neurological:  Negative for dizziness, tremors and light-headedness.         Objective:      Vitals:    12/11/23 1054   BP: 136/70   Pulse: 68   Resp: 16        Physical Exam  Constitutional:       Appearance: Normal appearance.   HENT:      Head: Normocephalic and atraumatic.      Right Ear: Tympanic membrane, ear canal and external ear normal.      Left Ear: Tympanic membrane, ear canal and external ear normal.      Nose: Nose normal.      Mouth/Throat:      Mouth: Mucous membranes are moist.   Eyes:      Conjunctiva/sclera: Conjunctivae normal.   Cardiovascular:      Rate and Rhythm: Normal rate and regular rhythm.      Pulses: Normal pulses.      Heart sounds: Normal heart sounds.   Pulmonary:      Effort: Pulmonary effort is normal.      Breath sounds:  Normal breath sounds.   Abdominal:      General: Abdomen is flat.      Palpations: Abdomen is soft.   Musculoskeletal:         General: Normal range of motion.      Cervical back: Normal range of motion and neck supple.   Skin:     General: Skin is warm and dry.   Neurological:      General: No focal deficit present.      Mental Status: He is alert and oriented to person, place, and time.   Psychiatric:         Mood and Affect: Mood normal.         Behavior: Behavior normal.         Judgment: Judgment normal.         BMP  Lab Results   Component Value Date     12/04/2023    K 5.0 12/04/2023     12/04/2023    CO2 27 12/04/2023    BUN 22 12/04/2023    CREATININE 1.0 12/04/2023    CALCIUM 9.8 12/04/2023    ANIONGAP 8 12/04/2023    EGFRNORACEVR >60 12/04/2023     Lab Results   Component Value Date    LDLCALC 147.8 12/04/2023     PSA, Screen (ng/mL)   Date Value   09/16/2022 0.60       Assessment:       Problem List Items Addressed This Visit          Cardiac/Vascular    Essential hypertension - Primary    Relevant Medications    lisinopriL 10 MG tablet    Other Relevant Orders    Comprehensive Metabolic Panel    Mixed hyperlipidemia    Relevant Orders    Lipid Panel       GI    Elevated LFTs    Relevant Orders    Comprehensive Metabolic Panel     Other Visit Diagnoses       Screening for prostate cancer        Relevant Orders    PSA, Screening            Plan:       1. Essential hypertension  Overview:  Two gram sodium diet.    Weight loss discussed.    Try to walk 2 miles per day.    Avoid smoking.    Current medications will be:  Lisinopril 10 mg daily    Orders:  -     lisinopriL 10 MG tablet; Take 1 tablet (10 mg total) by mouth once daily.  Dispense: 90 tablet; Refill: 1  -     Comprehensive Metabolic Panel; Future; Expected date: 05/11/2024    2. Mixed hyperlipidemia  Overview:  Low fat diet.  Avoid sweets.  A 10 pound weight loss by the next visit as a  good goal.  Increase consumption of fruits and  vegetables, fish and chicken.  Crestor caused elevated LFTs.  Consider restarting Crestor if LFTs returned to normal.    Orders:  -     Lipid Panel; Future; Expected date: 05/11/2024    3. Elevated LFTs  Overview:  Stopped Crestor  Repeat LFT's in 6 months.    Avoid ETOH    Orders:  -     Comprehensive Metabolic Panel; Future; Expected date: 05/11/2024    4. Screening for prostate cancer  -     PSA, Screening; Future; Expected date: 05/11/2024          RTC in 6 months

## 2023-12-14 ENCOUNTER — OFFICE VISIT (OUTPATIENT)
Dept: INTERNAL MEDICINE | Facility: CLINIC | Age: 68
End: 2023-12-14
Payer: MEDICARE

## 2023-12-14 VITALS
DIASTOLIC BLOOD PRESSURE: 66 MMHG | OXYGEN SATURATION: 100 % | WEIGHT: 245.81 LBS | HEIGHT: 74 IN | HEART RATE: 60 BPM | RESPIRATION RATE: 20 BRPM | BODY MASS INDEX: 31.55 KG/M2 | SYSTOLIC BLOOD PRESSURE: 120 MMHG

## 2023-12-14 DIAGNOSIS — Z00.00 ENCOUNTER FOR PREVENTIVE HEALTH EXAMINATION: ICD-10-CM

## 2023-12-14 DIAGNOSIS — R73.03 PRE-DIABETES: ICD-10-CM

## 2023-12-14 DIAGNOSIS — E78.2 MIXED HYPERLIPIDEMIA: ICD-10-CM

## 2023-12-14 DIAGNOSIS — D69.2 SENILE PURPURA: ICD-10-CM

## 2023-12-14 DIAGNOSIS — R79.89 ELEVATED LFTS: Primary | ICD-10-CM

## 2023-12-14 DIAGNOSIS — M48.061 SPINAL STENOSIS OF LUMBAR REGION WITHOUT NEUROGENIC CLAUDICATION: ICD-10-CM

## 2023-12-14 DIAGNOSIS — K76.0 FATTY LIVER: ICD-10-CM

## 2023-12-14 DIAGNOSIS — M48.02 SPINAL STENOSIS OF CERVICAL REGION: ICD-10-CM

## 2023-12-14 DIAGNOSIS — I10 ESSENTIAL HYPERTENSION: ICD-10-CM

## 2023-12-14 DIAGNOSIS — E66.9 OBESITY (BMI 30-39.9): ICD-10-CM

## 2023-12-14 PROCEDURE — 99999 PR PBB SHADOW E&M-EST. PATIENT-LVL IV: CPT | Mod: PBBFAC,,, | Performed by: NURSE PRACTITIONER

## 2023-12-14 PROCEDURE — 3288F PR FALLS RISK ASSESSMENT DOCUMENTED: ICD-10-PCS | Mod: CPTII,S$GLB,, | Performed by: NURSE PRACTITIONER

## 2023-12-14 PROCEDURE — 3078F PR MOST RECENT DIASTOLIC BLOOD PRESSURE < 80 MM HG: ICD-10-PCS | Mod: CPTII,S$GLB,, | Performed by: NURSE PRACTITIONER

## 2023-12-14 PROCEDURE — 1160F RVW MEDS BY RX/DR IN RCRD: CPT | Mod: CPTII,S$GLB,, | Performed by: NURSE PRACTITIONER

## 2023-12-14 PROCEDURE — 3078F DIAST BP <80 MM HG: CPT | Mod: CPTII,S$GLB,, | Performed by: NURSE PRACTITIONER

## 2023-12-14 PROCEDURE — 1159F PR MEDICATION LIST DOCUMENTED IN MEDICAL RECORD: ICD-10-PCS | Mod: CPTII,S$GLB,, | Performed by: NURSE PRACTITIONER

## 2023-12-14 PROCEDURE — 1159F MED LIST DOCD IN RCRD: CPT | Mod: CPTII,S$GLB,, | Performed by: NURSE PRACTITIONER

## 2023-12-14 PROCEDURE — 1160F PR REVIEW ALL MEDS BY PRESCRIBER/CLIN PHARMACIST DOCUMENTED: ICD-10-PCS | Mod: CPTII,S$GLB,, | Performed by: NURSE PRACTITIONER

## 2023-12-14 PROCEDURE — 1101F PR PT FALLS ASSESS DOC 0-1 FALLS W/OUT INJ PAST YR: ICD-10-PCS | Mod: CPTII,S$GLB,, | Performed by: NURSE PRACTITIONER

## 2023-12-14 PROCEDURE — 3074F SYST BP LT 130 MM HG: CPT | Mod: CPTII,S$GLB,, | Performed by: NURSE PRACTITIONER

## 2023-12-14 PROCEDURE — G0439 PR MEDICARE ANNUAL WELLNESS SUBSEQUENT VISIT: ICD-10-PCS | Mod: S$GLB,,, | Performed by: NURSE PRACTITIONER

## 2023-12-14 PROCEDURE — 1170F PR FUNCTIONAL STATUS ASSESSED: ICD-10-PCS | Mod: CPTII,S$GLB,, | Performed by: NURSE PRACTITIONER

## 2023-12-14 PROCEDURE — 99999 PR PBB SHADOW E&M-EST. PATIENT-LVL IV: ICD-10-PCS | Mod: PBBFAC,,, | Performed by: NURSE PRACTITIONER

## 2023-12-14 PROCEDURE — 1101F PT FALLS ASSESS-DOCD LE1/YR: CPT | Mod: CPTII,S$GLB,, | Performed by: NURSE PRACTITIONER

## 2023-12-14 PROCEDURE — 3288F FALL RISK ASSESSMENT DOCD: CPT | Mod: CPTII,S$GLB,, | Performed by: NURSE PRACTITIONER

## 2023-12-14 PROCEDURE — G0439 PPPS, SUBSEQ VISIT: HCPCS | Mod: S$GLB,,, | Performed by: NURSE PRACTITIONER

## 2023-12-14 PROCEDURE — 4010F PR ACE/ARB THEARPY RXD/TAKEN: ICD-10-PCS | Mod: CPTII,S$GLB,, | Performed by: NURSE PRACTITIONER

## 2023-12-14 PROCEDURE — 1170F FXNL STATUS ASSESSED: CPT | Mod: CPTII,S$GLB,, | Performed by: NURSE PRACTITIONER

## 2023-12-14 PROCEDURE — 3074F PR MOST RECENT SYSTOLIC BLOOD PRESSURE < 130 MM HG: ICD-10-PCS | Mod: CPTII,S$GLB,, | Performed by: NURSE PRACTITIONER

## 2023-12-14 PROCEDURE — 4010F ACE/ARB THERAPY RXD/TAKEN: CPT | Mod: CPTII,S$GLB,, | Performed by: NURSE PRACTITIONER

## 2023-12-14 NOTE — PROGRESS NOTES
"  Leonardo Pisano presented for a  Medicare AWV and comprehensive Health Risk Assessment today. The following components were reviewed and updated:    Medical history  Family History  Social history  Allergies and Current Medications  Health Risk Assessment  Health Maintenance  Care Team         ** See Completed Assessments for Annual Wellness Visit within the encounter summary.**         The following assessments were completed:  Living Situation  CAGE  Depression Screening  Timed Get Up and Go  Whisper Test  Cognitive Function Screening  Nutrition Screening  ADL Screening  PAQ Screening        Vitals:    12/14/23 0717   BP: 120/66   BP Location: Right arm   Patient Position: Sitting   BP Method: X-Large (Manual)   Pulse: 60   Resp: 20   SpO2: 100%   Weight: 111.5 kg (245 lb 13 oz)   Height: 6' 2" (1.88 m)     Body mass index is 31.56 kg/m².  Physical Exam  Vitals and nursing note reviewed.   Constitutional:       Appearance: Normal appearance.   HENT:      Head: Normocephalic and atraumatic.   Cardiovascular:      Rate and Rhythm: Normal rate and regular rhythm.   Pulmonary:      Effort: Pulmonary effort is normal. No respiratory distress.      Breath sounds: Normal breath sounds.   Abdominal:      Palpations: Abdomen is soft.   Musculoskeletal:         General: Normal range of motion.   Skin:     General: Skin is warm and dry.      Capillary Refill: Capillary refill takes less than 2 seconds.      Findings: Bruising present.   Neurological:      General: No focal deficit present.      Mental Status: He is alert and oriented to person, place, and time.   Psychiatric:         Mood and Affect: Mood normal.         Behavior: Behavior normal.         Thought Content: Thought content normal.         Judgment: Judgment normal.               Diagnoses and health risks identified today and associated recommendations/orders:    1. Encounter for preventive health examination  UTD with l;abs- due for PSA and is scheduled for " that with repeat lipid and cmp in 6 for f./u with pcp  UTD colon ca screen colo guard was 2021> due 2024  Also discussed flu/shingrix/rsv vaccines     2. Elevated LFTs  Stopped statin and the numbers have come down. Discussed diet and exercise to control cholesterol as well as help with fatty liver     3. Essential hypertension  Well controlled on lisinopril   120/66  Per pcp     4. Mixed hyperlipidemia  Had to stop statin due to elevated LFT  Rec diet and exercise to lower cholesterol     5. Spinal stenosis of cervical region  Noted on several MRI- PT exercises and stay active  No daily medications     6. Spinal stenosis of lumbar region without neurogenic claudication  Noted on several images  No daily medications   PT exercises     7. Obesity (BMI 30-39.9)  We discussed diet and exercise     8. Senile purpura  Noted on exam  Taking asa daily  Lab Results   Component Value Date    WBC 5.71 09/16/2022    HGB 13.9 (L) 09/16/2022    HCT 42.0 09/16/2022    MCV 93 09/16/2022     09/16/2022           9. Pre-diabetes  Has had several -120 last 2 years- discussed diet and weight loss to control this     10. Fatty liver  Hx of- now with elevated ;ft- diet and exercise discussed       Provided Leonardo with a 5-10 year written screening schedule and personal prevention plan. Recommendations were developed using the USPSTF age appropriate recommendations. Education, counseling, and referrals were provided as needed. After Visit Summary printed and given to patient which includes a list of additional screenings\tests needed.    No follow-ups on file.    Maci Dyer NP       reviewed and he has not filled anything     I offered to discuss advanced care planning, including how to pick a person who would make decisions for you if you were unable to make them for yourself, called a health care power of , and what kind of decisions you might make such as use of life sustaining treatments such as  ventilators and tube feeding when faced with a life limiting illness recorded on a living will that they will need to know. (How you want to be cared for as you near the end of your natural life)     X Patient is interested in learning more about how to make advanced directives.  I provided them paperwork and offered to discuss this with them. He has a wife and 2 children that he would want making his decisions. He also understands LW and will take packet home to discuss with wife

## 2023-12-14 NOTE — PATIENT INSTRUCTIONS
Counseling and Referral of Other Preventative  (Italic type indicates deductible and co-insurance are waived)    Patient Name: Leonardo Pisano  Today's Date: 12/14/2023    Health Maintenance       Date Due Completion Date    COVID-19 Vaccine (1) Never done ---    Shingles Vaccine (1 of 2) Never done ---    RSV Vaccine (Age 60+ and Pregnant patients) (1 - 1-dose 60+ series) Never done ---    Influenza Vaccine (1) 09/01/2023 11/17/2018    Colorectal Cancer Screening 10/11/2024 10/11/2021    Hemoglobin A1c (Diabetic Prevention Screening) 09/16/2025 9/16/2022    Lipid Panel 12/04/2028 12/4/2023    TETANUS VACCINE 09/25/2030 9/25/2020        No orders of the defined types were placed in this encounter.      The following information is provided to all patients.  This information is to help you find resources for any of the problems found today that may be affecting your health:                Living healthy guide: www.[facility].[place].[facility]      Understanding Diabetes: www.diabetes.org      Eating healthy: www.cdc.gov/healthyweight      CDC home safety checklist: www.cdc.gov/steadi/patient.html      Agency on Aging: www.goea.[place].gov      Alcoholics anonymous (AA): www.aa.org      Physical Activity: www.josé miguel.nih.gov/od9qtpt      Tobacco use: www.quitwithusla.org

## 2023-12-17 PROBLEM — R09.02 HYPOXIA: Status: RESOLVED | Noted: 2021-06-02 | Resolved: 2023-12-17

## 2023-12-17 PROBLEM — U07.1 PNEUMONIA DUE TO COVID-19 VIRUS: Status: RESOLVED | Noted: 2021-06-03 | Resolved: 2023-12-17

## 2023-12-17 PROBLEM — J12.82 PNEUMONIA DUE TO COVID-19 VIRUS: Status: RESOLVED | Noted: 2021-06-03 | Resolved: 2023-12-17

## 2024-04-08 ENCOUNTER — OFFICE VISIT (OUTPATIENT)
Dept: NEUROSURGERY | Facility: CLINIC | Age: 69
End: 2024-04-08
Payer: MEDICARE

## 2024-04-08 DIAGNOSIS — M48.02 SPINAL STENOSIS OF CERVICAL REGION: ICD-10-CM

## 2024-04-08 DIAGNOSIS — M41.9 SCOLIOSIS, UNSPECIFIED SCOLIOSIS TYPE, UNSPECIFIED SPINAL REGION: Primary | ICD-10-CM

## 2024-04-08 DIAGNOSIS — M48.061 SPINAL STENOSIS OF LUMBAR REGION WITHOUT NEUROGENIC CLAUDICATION: ICD-10-CM

## 2024-04-08 PROCEDURE — 99441 PR PHYSICIAN TELEPHONE EVALUATION 5-10 MIN: CPT | Mod: 95,,, | Performed by: NURSE PRACTITIONER

## 2024-04-08 NOTE — PROGRESS NOTES
Established Patient - Audio Only Telehealth Visit     The patient location is: Home  The chief complaint leading to consultation is: Second Opinion Back Pain  Visit type: Virtual visit with audio only (telephone)  Total time spent with patient: 10 minutes       The reason for the audio only service rather than synchronous audio and video virtual visit was related to technical difficulties or patient preference/necessity.     Each patient to whom I provide medical services by telemedicine is:  (1) informed of the relationship between the physician and patient and the respective role of any other health care provider with respect to management of the patient; and (2) notified that they may decline to receive medical services by telemedicine and may withdraw from such care at any time. Patient verbally consented to receive this service via voice-only telephone call.       HPI: Leonardo Pisano is a 68 y.o. male seen today to discuss obtaining a second opinion with Dr. Leslie regarding his progressive low back pain. States that he was followed by Dr. Presley and a MRI of the entire spine was obtained in 2022 which showed multilevel degenerative changes of the neck and low back and a pars defect at L5-S1. It was recommended that the patient proceed with cervical surgery prior to back surgery. He reports he did PT and injections in the past for his lower back which was helpful. He denies any pain in the extremities, weakness in the extremities, numbness/tingling in the extremities, clumsiness, gait dysfunction,  weakness. States that his back pain improved so he decided not to proceed with surgery. Until recently, he has noticed increased leg weakness over the past 6 months. He is taking Aleve with mild relief. Pain is worse with prolonged standing and bending. Pain improves with sitting and elevating his legs.     I have ordered an updated MRI of the entire spine and dynamic x-rays to evaluate for worsening stenosis.   The patient would like to follow-up in clinic with Dr. Leslie to discuss surgery in more detail. I have encouraged him to contact the clinic with any questions, concerns, or adverse clinical changes. He verbalized understanding.        TANI Blevins  Neurosurgery  [facility]. [place].         This service was not originating from a related E/M service provided within the previous 7 days nor will  to an E/M service or procedure within the next 24 hours or my soonest available appointment.  Prevailing standard of care was able to be met in this audio-only visit.

## 2024-04-09 ENCOUNTER — HOSPITAL ENCOUNTER (OUTPATIENT)
Dept: RADIOLOGY | Facility: HOSPITAL | Age: 69
Discharge: HOME | End: 2024-04-09
Attending: NURSE PRACTITIONER
Payer: MEDICARE

## 2024-04-09 DIAGNOSIS — M48.061 SPINAL STENOSIS OF LUMBAR REGION WITHOUT NEUROGENIC CLAUDICATION: ICD-10-CM

## 2024-04-09 DIAGNOSIS — M41.9 SCOLIOSIS, UNSPECIFIED SCOLIOSIS TYPE, UNSPECIFIED SPINAL REGION: ICD-10-CM

## 2024-04-09 DIAGNOSIS — M48.02 SPINAL STENOSIS OF CERVICAL REGION: ICD-10-CM

## 2024-04-09 PROCEDURE — 72114 X-RAY EXAM L-S SPINE BENDING: CPT | Mod: TC

## 2024-04-09 PROCEDURE — 72084 X-RAY EXAM ENTIRE SPI 6/> VW: CPT | Mod: 26,,, | Performed by: RADIOLOGY

## 2024-04-09 PROCEDURE — 72082 X-RAY EXAM ENTIRE SPI 2/3 VW: CPT | Mod: TC

## 2024-04-09 PROCEDURE — 72050 X-RAY EXAM NECK SPINE 4/5VWS: CPT | Mod: TC,59

## 2024-04-17 ENCOUNTER — TELEPHONE (OUTPATIENT)
Dept: NEUROSURGERY | Facility: CLINIC | Age: 69
End: 2024-04-17
Payer: MEDICARE

## 2024-04-17 ENCOUNTER — PATIENT MESSAGE (OUTPATIENT)
Dept: NEUROSURGERY | Facility: CLINIC | Age: 69
End: 2024-04-17
Payer: MEDICARE

## 2024-04-17 NOTE — TELEPHONE ENCOUNTER
Attempted to call patient to schedule next appointment. No response, unable to LVM. Sent patient portal message instead with next date and time.  Future Appointments   Date Time Provider Department Center   4/24/2024  9:15 AM STA MRI1 [facility]A [facility]   4/24/2024 11:30 AM STA MRI1 STAH [facility]   4/24/2024 12:15 PM STA MRI1 STA [facility]   5/27/2024  8:45 AM GEORGIANA SARAH [facility]   5/27/2024  3:30 PM Umberto Leslie MD [facility]   6/3/2024 10:00 AM Saud Coles MD [facility]

## 2024-04-24 ENCOUNTER — HOSPITAL ENCOUNTER (OUTPATIENT)
Dept: RADIOLOGY | Facility: HOSPITAL | Age: 69
Discharge: HOME | End: 2024-04-24
Attending: NURSE PRACTITIONER
Payer: MEDICARE

## 2024-04-24 DIAGNOSIS — M41.9 SCOLIOSIS, UNSPECIFIED SCOLIOSIS TYPE, UNSPECIFIED SPINAL REGION: ICD-10-CM

## 2024-04-24 DIAGNOSIS — M48.02 SPINAL STENOSIS OF CERVICAL REGION: ICD-10-CM

## 2024-04-24 DIAGNOSIS — M48.061 SPINAL STENOSIS OF LUMBAR REGION WITHOUT NEUROGENIC CLAUDICATION: ICD-10-CM

## 2024-04-24 PROCEDURE — 72148 MRI LUMBAR SPINE W/O DYE: CPT | Mod: 26,,, | Performed by: RADIOLOGY

## 2024-04-24 PROCEDURE — 72146 MRI CHEST SPINE W/O DYE: CPT | Mod: TC

## 2024-04-24 PROCEDURE — 72148 MRI LUMBAR SPINE W/O DYE: CPT | Mod: TC

## 2024-04-24 PROCEDURE — 72141 MRI NECK SPINE W/O DYE: CPT | Mod: TC

## 2024-04-24 PROCEDURE — 72141 MRI NECK SPINE W/O DYE: CPT | Mod: 26,,, | Performed by: RADIOLOGY

## 2024-04-24 PROCEDURE — 72146 MRI CHEST SPINE W/O DYE: CPT | Mod: 26,,, | Performed by: STUDENT IN AN ORGANIZED HEALTH CARE EDUCATION/TRAINING PROGRAM

## 2024-05-06 ENCOUNTER — LAB VISIT (OUTPATIENT)
Dept: LAB | Facility: HOSPITAL | Age: 69
End: 2024-05-06
Attending: FAMILY MEDICINE
Payer: MEDICARE

## 2024-05-06 DIAGNOSIS — I10 ESSENTIAL HYPERTENSION: ICD-10-CM

## 2024-05-06 DIAGNOSIS — R79.89 ELEVATED LFTS: ICD-10-CM

## 2024-05-06 DIAGNOSIS — E78.2 MIXED HYPERLIPIDEMIA: ICD-10-CM

## 2024-05-06 DIAGNOSIS — R73.09 ELEVATED GLUCOSE: Primary | ICD-10-CM

## 2024-05-06 DIAGNOSIS — Z12.5 SCREENING FOR PROSTATE CANCER: ICD-10-CM

## 2024-05-06 LAB
ALBUMIN SERPL BCP-MCNC: 3.6 G/DL (ref 3.5–5.2)
ALP SERPL-CCNC: 110 U/L (ref 55–135)
ALT SERPL W/O P-5'-P-CCNC: 110 U/L (ref 10–44)
ANION GAP SERPL CALC-SCNC: 6 MMOL/L (ref 8–16)
AST SERPL-CCNC: 61 U/L (ref 10–40)
BILIRUB SERPL-MCNC: 0.5 MG/DL (ref 0.1–1)
BUN SERPL-MCNC: 25 MG/DL (ref 8–23)
CALCIUM SERPL-MCNC: 9.4 MG/DL (ref 8.7–10.5)
CHLORIDE SERPL-SCNC: 109 MMOL/L (ref 95–110)
CHOLEST SERPL-MCNC: 219 MG/DL (ref 120–199)
CHOLEST/HDLC SERPL: 3.3 {RATIO} (ref 2–5)
CO2 SERPL-SCNC: 26 MMOL/L (ref 23–29)
COMPLEXED PSA SERPL-MCNC: 0.46 NG/ML (ref 0–4)
CREAT SERPL-MCNC: 0.9 MG/DL (ref 0.5–1.4)
EST. GFR  (NO RACE VARIABLE): >60 ML/MIN/1.73 M^2
GLUCOSE SERPL-MCNC: 121 MG/DL (ref 70–110)
HDLC SERPL-MCNC: 67 MG/DL (ref 40–75)
HDLC SERPL: 30.6 % (ref 20–50)
LDLC SERPL CALC-MCNC: 132.4 MG/DL (ref 63–159)
NONHDLC SERPL-MCNC: 152 MG/DL
POTASSIUM SERPL-SCNC: 4.8 MMOL/L (ref 3.5–5.1)
PROT SERPL-MCNC: 6.2 G/DL (ref 6–8.4)
SODIUM SERPL-SCNC: 141 MMOL/L (ref 136–145)
TRIGL SERPL-MCNC: 98 MG/DL (ref 30–150)

## 2024-05-06 PROCEDURE — 84153 ASSAY OF PSA TOTAL: CPT | Performed by: FAMILY MEDICINE

## 2024-05-06 PROCEDURE — 36415 COLL VENOUS BLD VENIPUNCTURE: CPT | Performed by: FAMILY MEDICINE

## 2024-05-06 PROCEDURE — 80053 COMPREHEN METABOLIC PANEL: CPT | Performed by: FAMILY MEDICINE

## 2024-05-06 PROCEDURE — 80061 LIPID PANEL: CPT | Performed by: FAMILY MEDICINE

## 2024-05-06 NOTE — PROGRESS NOTES
Tell patient that labs show mild liver enzyme elevations consistent with fatty liver.  Blood sugar seems to be elevated.  Recommend getting a hemoglobin A1c Cimetidine Pregnancy And Lactation Text: This medication is Pregnancy Category B and is considered safe during pregnancy. It is also excreted in breast milk and breast feeding isn't recommended.

## 2024-05-07 NOTE — PROGRESS NOTES
MEDICAL HISTORY:  Hypertension.  Hyperlipidemia.  Fatty liver.  Lumbar spondylosis   Cervical spondylosis     SOCIAL HISTORY:  Tobacco use, none.  Alcohol use, none.      Medications   Lisinopril 10 mg half a tablet      Component      Latest Ref Rng 5/6/2024   Sodium      136 - 145 mmol/L 141    Potassium      3.5 - 5.1 mmol/L 4.8    Chloride      95 - 110 mmol/L 109    CO2      23 - 29 mmol/L 26    Glucose      70 - 110 mg/dL 121 (H)    BUN      8 - 23 mg/dL 25 (H)    Creatinine      0.5 - 1.4 mg/dL 0.9    Calcium      8.7 - 10.5 mg/dL 9.4    PROTEIN TOTAL      6.0 - 8.4 g/dL 6.2    Albumin      3.5 - 5.2 g/dL 3.6    BILIRUBIN TOTAL      0.1 - 1.0 mg/dL 0.5    ALP      55 - 135 U/L 110    AST      10 - 40 U/L 61 (H)    ALT      10 - 44 U/L 110 (H)    eGFR      >60 mL/min/1.73 m^2 >60    Anion Gap      8 - 16 mmol/L 6 (L)    Cholesterol Total      120 - 199 mg/dL 219 (H)    Triglycerides      30 - 150 mg/dL 98    HDL      40 - 75 mg/dL 67    LDL Cholesterol      63.0 - 159.0 mg/dL 132.4    HDL/Cholesterol Ratio      20.0 - 50.0 % 30.6    Total Cholesterol/HDL Ratio      2.0 - 5.0  3.3    Non-HDL Cholesterol      mg/dL 152    Prostate Specific Antigen      0.00 - 4.00 ng/mL 0.46       Legend:  (H) High  (L) Low      69-year-old male   Here with his wife    Reason for the visit is is to go the recent test results.  Which included that of radiographs and MRIs of the cervical thoracic lumbar vertebrae.  He was known to have degenerative changes of the vertebrae.  Couple years ago he was have cervical vertebrae surgery.  But canceled at the end.    He finds that he is having weakness involving his lower extremities, the legs.  At times it was week to squat down and get up.  He has lumbar pain at times it could extend to the buttocks in his back of the leg in his can feel a burning discomfort involving the legs.    Actually reports no pain involving the neck in no radicular symptoms he has chronic left shoulder pain from  rotator cuff arthropathy.  At times he might feel crampy in his 2nd and 3rd fingers of his right hand    Recent MRI the cervical spine shows severe spinal stenosis at C5-6 and C6-7 with a degree of myelomalacia at C6-7.  That is neuroforaminal stenosis.  Thoracic spine MRI showed a degree of spinal stenosis at T11-T12.  MRI lumbar spine showed stenosis from L3-4 to L5-1 with various degrees of neuroforaminal stenosis.  Incidental finding of left renal cyst.    From his recent labs liver enzymes were elevated.  This is been an off and on issue for the past number of years.  In 2009 he had an ultrasound that confirmed fatty liver.  He was tested for hepatitis in 2004    It was also noted that is hemoglobin A1c is at 115.  total cholesterol elevated at 219    Examination   Weight 244 lb   BMI 31.42   Pulse 52  Blood pressure by me 136/68   Neck no thyromegaly no masses  Chest clear breath sounds  Heart regular rate and rhythm  Abdominal exam nontender soft no hepatosplenomegaly abdominal masses  Pulses 2+ carotid pulses no bruits, 2+ dorsalis pedal pulses  1+ biceps triceps knee and ankle jerk reflexes  Two to 3+ bilateral knee in 2+ ankle jerk reflexes  Motor strength with hand  and flexion-extension forearm at the elbow is normal   Motor strength with extension legs at the knee and flexion at the hip is normal.    Impression   Lower extremity weakness, probably a combination of both cervical spinal stenosis lumbar stenosis   Cervical spondylosis with cervical spinal stenosis and myelomalacia   Lumbar spondylosis with lumbar spinal stenosis and neuroforaminal stenosis  Hypertension  Hyperlipidemia  Hyperglycemia   Hepatic steatosis   Left renal cyst    Plan   CBC, hemoglobin A1c, TSH   Abdominal ultrasound evaluate the liver spleen and kidney  He has upcoming appointment with neuro surgery discussed the MRI findings  Regular walking routine in his activity and weight management discussed

## 2024-05-08 ENCOUNTER — OFFICE VISIT (OUTPATIENT)
Dept: INTERNAL MEDICINE | Facility: CLINIC | Age: 69
End: 2024-05-08
Payer: MEDICARE

## 2024-05-08 ENCOUNTER — LAB VISIT (OUTPATIENT)
Dept: LAB | Facility: HOSPITAL | Age: 69
End: 2024-05-08
Attending: FAMILY MEDICINE
Payer: MEDICARE

## 2024-05-08 VITALS
DIASTOLIC BLOOD PRESSURE: 68 MMHG | HEIGHT: 74 IN | WEIGHT: 244.69 LBS | BODY MASS INDEX: 31.4 KG/M2 | HEART RATE: 51 BPM | SYSTOLIC BLOOD PRESSURE: 140 MMHG | OXYGEN SATURATION: 99 %

## 2024-05-08 DIAGNOSIS — N28.1 RENAL CYST: ICD-10-CM

## 2024-05-08 DIAGNOSIS — R73.9 HYPERGLYCEMIA: ICD-10-CM

## 2024-05-08 DIAGNOSIS — E78.5 HYPERLIPIDEMIA, UNSPECIFIED HYPERLIPIDEMIA TYPE: ICD-10-CM

## 2024-05-08 DIAGNOSIS — K76.0 HEPATIC STEATOSIS: ICD-10-CM

## 2024-05-08 DIAGNOSIS — M47.27 LUMBOSACRAL SPONDYLOSIS WITH RADICULOPATHY: ICD-10-CM

## 2024-05-08 DIAGNOSIS — I10 ESSENTIAL HYPERTENSION: ICD-10-CM

## 2024-05-08 DIAGNOSIS — R29.898 WEAKNESS OF BOTH LOWER EXTREMITIES: ICD-10-CM

## 2024-05-08 DIAGNOSIS — M47.12 CERVICAL SPONDYLOSIS WITH MYELOPATHY: ICD-10-CM

## 2024-05-08 DIAGNOSIS — R73.09 ELEVATED GLUCOSE: ICD-10-CM

## 2024-05-08 DIAGNOSIS — R29.898 WEAKNESS OF BOTH LOWER EXTREMITIES: Primary | ICD-10-CM

## 2024-05-08 LAB
BASOPHILS # BLD AUTO: 0.04 K/UL (ref 0–0.2)
BASOPHILS NFR BLD: 0.6 % (ref 0–1.9)
DIFFERENTIAL METHOD BLD: ABNORMAL
EOSINOPHIL # BLD AUTO: 0.3 K/UL (ref 0–0.5)
EOSINOPHIL NFR BLD: 5.2 % (ref 0–8)
ERYTHROCYTE [DISTWIDTH] IN BLOOD BY AUTOMATED COUNT: 12.9 % (ref 11.5–14.5)
ESTIMATED AVG GLUCOSE: 126 MG/DL (ref 68–131)
ESTIMATED AVG GLUCOSE: 126 MG/DL (ref 68–131)
HBA1C MFR BLD: 6 % (ref 4–5.6)
HBA1C MFR BLD: 6 % (ref 4–5.6)
HCT VFR BLD AUTO: 43.6 % (ref 40–54)
HCV AB SERPL QL IA: NORMAL
HGB BLD-MCNC: 14.5 G/DL (ref 14–18)
IMM GRANULOCYTES # BLD AUTO: 0.02 K/UL (ref 0–0.04)
IMM GRANULOCYTES NFR BLD AUTO: 0.3 % (ref 0–0.5)
LYMPHOCYTES # BLD AUTO: 1.6 K/UL (ref 1–4.8)
LYMPHOCYTES NFR BLD: 23.9 % (ref 18–48)
MCH RBC QN AUTO: 31.7 PG (ref 27–31)
MCHC RBC AUTO-ENTMCNC: 33.3 G/DL (ref 32–36)
MCV RBC AUTO: 95 FL (ref 82–98)
MONOCYTES # BLD AUTO: 0.7 K/UL (ref 0.3–1)
MONOCYTES NFR BLD: 10.2 % (ref 4–15)
NEUTROPHILS # BLD AUTO: 3.9 K/UL (ref 1.8–7.7)
NEUTROPHILS NFR BLD: 59.8 % (ref 38–73)
NRBC BLD-RTO: 0 /100 WBC
PLATELET # BLD AUTO: 230 K/UL (ref 150–450)
PMV BLD AUTO: 10.1 FL (ref 9.2–12.9)
RBC # BLD AUTO: 4.58 M/UL (ref 4.6–6.2)
TSH SERPL DL<=0.005 MIU/L-ACNC: 1.56 UIU/ML (ref 0.4–4)
WBC # BLD AUTO: 6.54 K/UL (ref 3.9–12.7)

## 2024-05-08 PROCEDURE — 3078F DIAST BP <80 MM HG: CPT | Mod: CPTII,S$GLB,, | Performed by: INTERNAL MEDICINE

## 2024-05-08 PROCEDURE — 99214 OFFICE O/P EST MOD 30 MIN: CPT | Mod: S$GLB,,, | Performed by: INTERNAL MEDICINE

## 2024-05-08 PROCEDURE — 1125F AMNT PAIN NOTED PAIN PRSNT: CPT | Mod: CPTII,S$GLB,, | Performed by: INTERNAL MEDICINE

## 2024-05-08 PROCEDURE — 3288F FALL RISK ASSESSMENT DOCD: CPT | Mod: CPTII,S$GLB,, | Performed by: INTERNAL MEDICINE

## 2024-05-08 PROCEDURE — 1159F MED LIST DOCD IN RCRD: CPT | Mod: CPTII,S$GLB,, | Performed by: INTERNAL MEDICINE

## 2024-05-08 PROCEDURE — 85025 COMPLETE CBC W/AUTO DIFF WBC: CPT | Performed by: INTERNAL MEDICINE

## 2024-05-08 PROCEDURE — 1160F RVW MEDS BY RX/DR IN RCRD: CPT | Mod: CPTII,S$GLB,, | Performed by: INTERNAL MEDICINE

## 2024-05-08 PROCEDURE — 3077F SYST BP >= 140 MM HG: CPT | Mod: CPTII,S$GLB,, | Performed by: INTERNAL MEDICINE

## 2024-05-08 PROCEDURE — 84443 ASSAY THYROID STIM HORMONE: CPT | Performed by: INTERNAL MEDICINE

## 2024-05-08 PROCEDURE — 4010F ACE/ARB THERAPY RXD/TAKEN: CPT | Mod: CPTII,S$GLB,, | Performed by: INTERNAL MEDICINE

## 2024-05-08 PROCEDURE — 36415 COLL VENOUS BLD VENIPUNCTURE: CPT | Performed by: INTERNAL MEDICINE

## 2024-05-08 PROCEDURE — 86803 HEPATITIS C AB TEST: CPT | Performed by: INTERNAL MEDICINE

## 2024-05-08 PROCEDURE — 99999 PR PBB SHADOW E&M-EST. PATIENT-LVL III: CPT | Mod: PBBFAC,,, | Performed by: INTERNAL MEDICINE

## 2024-05-08 PROCEDURE — 83036 HEMOGLOBIN GLYCOSYLATED A1C: CPT | Performed by: FAMILY MEDICINE

## 2024-05-08 PROCEDURE — 3008F BODY MASS INDEX DOCD: CPT | Mod: CPTII,S$GLB,, | Performed by: INTERNAL MEDICINE

## 2024-05-08 PROCEDURE — 1101F PT FALLS ASSESS-DOCD LE1/YR: CPT | Mod: CPTII,S$GLB,, | Performed by: INTERNAL MEDICINE

## 2024-05-15 ENCOUNTER — HOSPITAL ENCOUNTER (OUTPATIENT)
Dept: RADIOLOGY | Facility: HOSPITAL | Age: 69
Discharge: HOME | End: 2024-05-15
Attending: INTERNAL MEDICINE
Payer: MEDICARE

## 2024-05-15 DIAGNOSIS — N28.1 RENAL CYST: ICD-10-CM

## 2024-05-15 DIAGNOSIS — K76.0 HEPATIC STEATOSIS: ICD-10-CM

## 2024-05-15 PROCEDURE — 76700 US EXAM ABDOM COMPLETE: CPT | Mod: TC

## 2024-05-17 ENCOUNTER — PATIENT MESSAGE (OUTPATIENT)
Dept: INTERNAL MEDICINE | Facility: CLINIC | Age: 69
End: 2024-05-17
Payer: MEDICARE

## 2024-05-28 NOTE — TELEPHONE ENCOUNTER
Pt requesting results from ultra sound and want to know if any further testing is needed.    Please review portal msg and respond,  Thank You.

## 2024-05-30 ENCOUNTER — PATIENT MESSAGE (OUTPATIENT)
Dept: HEPATOLOGY | Facility: CLINIC | Age: 69
End: 2024-05-30

## 2024-05-30 ENCOUNTER — TELEPHONE (OUTPATIENT)
Dept: NEUROSURGERY | Facility: CLINIC | Age: 69
End: 2024-05-30
Payer: MEDICARE

## 2024-05-30 ENCOUNTER — PROCEDURE VISIT (OUTPATIENT)
Dept: HEPATOLOGY | Facility: CLINIC | Age: 69
End: 2024-05-30
Payer: MEDICARE

## 2024-05-30 ENCOUNTER — OFFICE VISIT (OUTPATIENT)
Dept: NEUROSURGERY | Facility: CLINIC | Age: 69
End: 2024-05-30
Payer: MEDICARE

## 2024-05-30 ENCOUNTER — TELEPHONE (OUTPATIENT)
Dept: PREADMISSION TESTING | Facility: HOSPITAL | Age: 69
End: 2024-05-30
Payer: MEDICARE

## 2024-05-30 ENCOUNTER — OFFICE VISIT (OUTPATIENT)
Dept: HEPATOLOGY | Facility: CLINIC | Age: 69
End: 2024-05-30
Payer: MEDICARE

## 2024-05-30 VITALS — HEIGHT: 74 IN | WEIGHT: 244.69 LBS | BODY MASS INDEX: 31.4 KG/M2

## 2024-05-30 VITALS — DIASTOLIC BLOOD PRESSURE: 77 MMHG | SYSTOLIC BLOOD PRESSURE: 148 MMHG | HEART RATE: 52 BPM

## 2024-05-30 DIAGNOSIS — K76.0 HEPATIC STEATOSIS: ICD-10-CM

## 2024-05-30 DIAGNOSIS — M47.12 CERVICAL SPONDYLOSIS WITH MYELOPATHY: Primary | ICD-10-CM

## 2024-05-30 DIAGNOSIS — I10 ESSENTIAL HYPERTENSION: ICD-10-CM

## 2024-05-30 DIAGNOSIS — K76.0 METABOLIC DYSFUNCTION-ASSOCIATED STEATOTIC LIVER DISEASE (MASLD): ICD-10-CM

## 2024-05-30 DIAGNOSIS — E78.2 MIXED HYPERLIPIDEMIA: ICD-10-CM

## 2024-05-30 DIAGNOSIS — M43.10 SPONDYLOLISTHESIS, UNSPECIFIED SPINAL REGION: Primary | ICD-10-CM

## 2024-05-30 DIAGNOSIS — M79.609 PAIN IN EXTREMITY, UNSPECIFIED EXTREMITY: ICD-10-CM

## 2024-05-30 DIAGNOSIS — E66.9 OBESITY (BMI 30-39.9): ICD-10-CM

## 2024-05-30 DIAGNOSIS — K76.0 METABOLIC DYSFUNCTION-ASSOCIATED STEATOTIC LIVER DISEASE (MASLD): Primary | ICD-10-CM

## 2024-05-30 DIAGNOSIS — R74.8 ABNORMAL LIVER ENZYMES: ICD-10-CM

## 2024-05-30 DIAGNOSIS — Z01.818 PREOPERATIVE TESTING: Primary | ICD-10-CM

## 2024-05-30 DIAGNOSIS — R73.03 PRE-DIABETES: ICD-10-CM

## 2024-05-30 PROBLEM — R79.89 ELEVATED LFTS: Status: RESOLVED | Noted: 2021-06-03 | Resolved: 2024-05-30

## 2024-05-30 PROCEDURE — 3044F HG A1C LEVEL LT 7.0%: CPT | Mod: CPTII,S$GLB,, | Performed by: NURSE PRACTITIONER

## 2024-05-30 PROCEDURE — 91200 LIVER ELASTOGRAPHY: CPT | Mod: S$GLB,,, | Performed by: NURSE PRACTITIONER

## 2024-05-30 PROCEDURE — 1125F AMNT PAIN NOTED PAIN PRSNT: CPT | Mod: CPTII,S$GLB,, | Performed by: NEUROLOGICAL SURGERY

## 2024-05-30 PROCEDURE — 4010F ACE/ARB THERAPY RXD/TAKEN: CPT | Mod: CPTII,S$GLB,, | Performed by: NURSE PRACTITIONER

## 2024-05-30 PROCEDURE — 3288F FALL RISK ASSESSMENT DOCD: CPT | Mod: CPTII,S$GLB,, | Performed by: NEUROLOGICAL SURGERY

## 2024-05-30 PROCEDURE — 1101F PT FALLS ASSESS-DOCD LE1/YR: CPT | Mod: CPTII,S$GLB,, | Performed by: NURSE PRACTITIONER

## 2024-05-30 PROCEDURE — 4010F ACE/ARB THERAPY RXD/TAKEN: CPT | Mod: CPTII,S$GLB,, | Performed by: NEUROLOGICAL SURGERY

## 2024-05-30 PROCEDURE — 1101F PT FALLS ASSESS-DOCD LE1/YR: CPT | Mod: CPTII,S$GLB,, | Performed by: NEUROLOGICAL SURGERY

## 2024-05-30 PROCEDURE — 3044F HG A1C LEVEL LT 7.0%: CPT | Mod: CPTII,S$GLB,, | Performed by: NEUROLOGICAL SURGERY

## 2024-05-30 PROCEDURE — 99999 PR PBB SHADOW E&M-EST. PATIENT-LVL III: CPT | Mod: PBBFAC,,, | Performed by: NEUROLOGICAL SURGERY

## 2024-05-30 PROCEDURE — 99213 OFFICE O/P EST LOW 20 MIN: CPT | Mod: S$GLB,,, | Performed by: NEUROLOGICAL SURGERY

## 2024-05-30 PROCEDURE — 3077F SYST BP >= 140 MM HG: CPT | Mod: CPTII,S$GLB,, | Performed by: NEUROLOGICAL SURGERY

## 2024-05-30 PROCEDURE — 1160F RVW MEDS BY RX/DR IN RCRD: CPT | Mod: CPTII,S$GLB,, | Performed by: NURSE PRACTITIONER

## 2024-05-30 PROCEDURE — 1159F MED LIST DOCD IN RCRD: CPT | Mod: CPTII,S$GLB,, | Performed by: NURSE PRACTITIONER

## 2024-05-30 PROCEDURE — 1125F AMNT PAIN NOTED PAIN PRSNT: CPT | Mod: CPTII,S$GLB,, | Performed by: NURSE PRACTITIONER

## 2024-05-30 PROCEDURE — 99999 PR PBB SHADOW E&M-EST. PATIENT-LVL III: CPT | Mod: PBBFAC,,, | Performed by: NURSE PRACTITIONER

## 2024-05-30 PROCEDURE — 99214 OFFICE O/P EST MOD 30 MIN: CPT | Mod: S$GLB,,, | Performed by: NURSE PRACTITIONER

## 2024-05-30 PROCEDURE — 3078F DIAST BP <80 MM HG: CPT | Mod: CPTII,S$GLB,, | Performed by: NEUROLOGICAL SURGERY

## 2024-05-30 PROCEDURE — 3288F FALL RISK ASSESSMENT DOCD: CPT | Mod: CPTII,S$GLB,, | Performed by: NURSE PRACTITIONER

## 2024-05-30 PROCEDURE — 3008F BODY MASS INDEX DOCD: CPT | Mod: CPTII,S$GLB,, | Performed by: NURSE PRACTITIONER

## 2024-05-30 NOTE — ANESTHESIA PAT ROS NOTE
6/6/2024  Leonardo Pisano is a 69 y.o., male with Spinal stenosis of cervical region & Spinal stenosis of lumbar region without neurogenic claudication, [facility] for anesthesia assessment and preop instructions.      Pre-op Assessment    I have reviewed the Patient Summary Reports.     I have reviewed the Nursing Notes. I have reviewed the NPO Status.      Review of Systems  Anesthesia Hx:  No problems with previous Anesthesia             Denies Family Hx of Anesthesia complications.    Denies Personal Hx of Anesthesia complications.                    Social:  Non-Smoker, No Alcohol Use       Hematology/Oncology:  Hematology Normal   Oncology Normal                                   EENT/Dental:  EENT/Dental Normal           Cardiovascular:     Hypertension, well controlled       Denies Angina.    denies PVD hyperlipidemia                             Pulmonary:  Pneumonia  Denies COPD.  Denies Asthma.   Denies Shortness of breath.  Denies Recent URI.  Denies Sleep Apnea.     06/03/2021  Pneumonia due to COVID-19 virus  06/02/2021  Hypoxia                 Renal/:  Chronic Renal Disease   Bilateral renal cysts     Kidney Function/Disease             Hepatic/GI:   Denies PUD.   Denies GERD. Liver Disease, Hepatitis, A        Liver Disease, HepatitisDenies Fatty Liver        Musculoskeletal:  Arthritis        Arthritis, Osteoarthritis, spine Spinal stenosis of cervical region, Spinal stenosis of lumbar region without neurogenic claudication,     Spine Disorders: cervical Degenerative disease, Disc disease and Chronic Pain      Spinal Stenosis, Cervical Spinal Stenosis Cervical Spine Disorder, Cervical Disc Disease     Neurological:  Denies TIA.  Denies CVA. Neuromuscular Disease,        Neuro Symptoms of pain Neck stiffness     Chronic Pain Syndrome Pain Etiology/Diagnosis, Arthritis, Osteoarthritis, spine                           Endocrine:     prediabetes      Denies Obesity / BMI >  30  Psych:   denies anxiety denies depression                Physical Exam  General: Well nourished, Cooperative, Alert and Oriented    Airway:  Mouth Opening: Normal  Tongue: Normal  Neck ROM: Normal ROM  Neck: Girth Increased    Dental:  Intact    Chest/Lungs:  Clear to auscultation, Normal Respiratory Rate    Heart:  Rate: Normal  Rhythm: Regular Rhythm  Sounds: Normal          Anesthesia Assessment: Preoperative EQUATION    Planned Procedure: Procedure(s) (LRB):  C5-6,C6-7 ANTERIOR CERVICAL DISCECTOMY AND FUSION (N/A)  Requested Anesthesia Type:General  Surgeon: Umberto Leslie MD  Service: Neurosurgery  Known or anticipated Date of Surgery:6/12/2024    Surgeon notes: reviewed    Electronic QUestionnaire Assessment completed via nurse interview with patient.        Triage considerations:       Previous anesthesia records:No problems and Not available    Last PCP note: within 3 months , within [facility]   Subspecialty notes: Hepatology, Neurosurgery    Other important co-morbidities:PER EPIC:  HLD, HTN, Obesity, and CERVICAL SPINAL STENOSIS       Tests already available:  Available tests,  within 3 months , within [facility] .   5/8/2024 TSH, HGA1C, CBC, 5/6/2024 CMP, 4/24/2024 MRI CERVICAL SPINE W/O CONTRAST, 4/9/2024 XRAY CERVICAL SPINE AP/LAT W F/E          Instructions given. (See in Nurse's note)    Optimization:  Anesthesia Preop Clinic Assessment  Indicated    Medical Opinion Indicated           Plan:    Testing:  EKG, PT/INR, and T&S   Pre-anesthesia  visit       Visit focus: concerns in complex and/or prolonged anesthesia     Consultation:Patient's PCP for re-evaluation     Patient  has previously scheduled Medical Appointment:6/3 DR COLES    Navigation: Tests Scheduled. TBD             Consults scheduled.TBD             Results will be tracked by Preop Clinic.  6/4 Medical clearance given by Dr. Saud Coles on 6/3:   Patient is medically cleared for surgery.  Still awaiting EKG, INR.  Blood work  last month was normal.      Relevant Orders     X-Ray Chest PA And Lateral (Completed)     POCT urinalysis, dipstick or tablet reag (Completed)     POCT URINE SEDIMENT EXAM (Completed)   Leonarda Ferrara RN BSN

## 2024-05-30 NOTE — PATIENT INSTRUCTIONS
Fibroscan to assess for liver fibrosis/damage from fatty liver        FATTY LIVER EDUCATION:  Lifestyle changes can help to reverse fatty liver and prevent liver damage:  1. Weight loss goal of 20 lbs    *Weight loss of 5% has been shown to reduce fat in the liver  *Weight loss of 7-10% has been shown to improve fatty liver, inflammation from fatty liver, and liver fibrosis (scarring/damage)    2. Decreasing daily calories is recommended for weight loss. Try to limit carbohydrate intake to LESS than 30-45 grams of carbs with a meal and LESS than 5-10 grams of carbs with a snack. Avoid drinking beverages with sugar/carbohydrates. Meeting with a dietician or using one of the weight loss apps below can be helpful to determine individualized calorie goals and add up carbs throughout the day. Look for snacks high in protein, low in carbohydrates/sugar.    3. Exercise as tolerated. Studies have shown that exercise improves fatty liver even without weight loss.     4. Recommend good control of blood pressure, cholesterol, and blood sugar levels as these are risk factors for fatty liver. Cholesterol lowering medications including statins are typically safe and beneficial (even if liver enzymes are elevated).    5. Limit alcohol consumption, no more than 2 serving(s) of alcohol in any day (1 serving is 5 ounces of wine, 12 ounces of beer, or 1.5 ounces of liquor). Do not recommend daily alcohol use or drinking alcohol most days per week.    In some people, fatty liver can progress to steatohepatitis (inflammatory fatty liver). This can cause liver scarring or damage (fibrosis) and possibly cirrhosis. Cirrhosis increases risk of liver cancer and liver failure. Lifestyle changes now can help to decrease this risk.         Additional Resources:    Websites with information about fatty liver and inflammation related to fatty liver ([facility]): www.nashtruth.com and www.nashactually.com   [facility]: Non-Alcoholic Fatty Liver Disease  (NAFLD)    Facebook support group with tips and recipes:   Non-Alcoholic Fatty Liver Disease (NAFLD) Diet & Nutrition Support     Can download Kids Calendar Pal or Lose It bee to add up your calories and carbohydrates throughout the day.      Try www.Capricor.MVP Vault for recipes.    If interested in seeing a dietician to create a weight loss plan, contact the dietician team at [facility]: [email or web address].  You can also call to schedule a consult with a dietician: [phone]  *Virtual visits are available with one of our dieticians.     If you have diabetes or high blood pressure, you can meet with a Health  through [facility] [employer] program.    Let us know if you are interested in a referral to [facility].

## 2024-05-30 NOTE — TELEPHONE ENCOUNTER
Called patient and scheduled imaging ahead of surgery  Future Appointments   Date Time Provider Department Center   6/3/2024 10:00 AM Saud Coles MD [facility]   6/11/2024 11:30 AM [facility]   6/11/2024 11:45 AM [facility]-CT1 500 LB LIMIT [facility] CTSC [facility]   6/21/2024  9:30 AM Odell Tobar,  [facility] PAINMA [place]   9/17/2024  2:00 PM Thania Starr, NP [facility] HEPAT [place]      Patient voiced understanding and thanked me.

## 2024-05-30 NOTE — PROGRESS NOTES
[facility] - New Patient     REFERRING PROVIDER: Dr. Jesus Manuel Ortiz  PCP: Saud Coles MD    Chief Complaint: Fatty liver, elevated liver enzymes      HISTORY     This is a 69 y.o. male referred for evaluation of above.    Fatty liver noted on recent abd US 5/15/24. Liver normal size, no liver lesions or biliary dilation. Spleen WNL. No findings to suggest advanced liver fibrosis.     Review of labs:  - Transaminases: intermittently elevated since at least 2004, ALT>AST. ALT currently ~100   - Alk phos: WNL  - Synthetic liver function: WNL  - Platelets: WNL    Workup thus far:  Serologies:   Lab Results   Component Value Date    SMOOTHMUSCAB Pos 1:40 (A) 02/25/2010    AMAIFA Negative 02/25/2010    IGGSERUM 972 02/25/2010    HEIDE Negative 03/25/2010    FERRITIN 4,230 (H) 06/02/2021    FESATURATED 33 02/25/2010    [id number] MS 02/25/2010    FJDPT1DLRYWZ 101 02/25/2010    HEPBSAG Negative 02/16/2004    HEPCAB Non-reactive 05/08/2024    CERULOPLSM 25.6 02/25/2010     (H) 06/02/2021     (H) 06/02/2021      Risk factors for fatty liver:   Weight -- Body mass index is 31.42 kg/m².    Weight is down ~5 lb over the last few months                      Dyslipidemia -- yes                                Insulin resistance / diabetes -- yes, HgbA1c 6        Hypertension -- yes  Alcohol use -- very infrequent, case of beer will last an entire year    Family history:  No family history of liver disease or cirrhosis.  Mother may have had cholangiocarcinoma. Grandfather may have had liver cancer.     Meds/supplements:  -Rx: none concerning from liver standpoint  -OTC, herbs/vitamins: vitamin C, MV   No recent medication changes.     Social history:  Occupation: retired, oil industry   Exercise: not strenuous; no myalgias   Lives with: wife  Denies illicit drug use.    Denies recent illness or infection.    No symptoms of hepatic decompensation including jaundice, ascites, cognitive problems  to suggest hepatic encephalopathy, or GI bleeding.          Past medical history, surgical history, problem list, family history, social history, allergies: Reviewed and updated in the appropriate section of the electronic medical record.      Current Outpatient Medications   Medication Sig Dispense Refill    ascorbic acid, vitamin C, (VITAMIN C) 500 MG tablet Take 1 tablet (500 mg total) by mouth 2 (two) times daily. 60 tablet 0    aspirin (ECOTRIN) 81 MG EC tablet Take 1 tablet (81 mg total) by mouth once daily. 30 tablet 0    lisinopriL 10 MG tablet Take 1 tablet (10 mg total) by mouth once daily. 90 tablet 1    multivitamin capsule Take 1 capsule by mouth once daily.       No current facility-administered medications for this visit.     Medication list reviewed and updated.      Review of Systems - as per HPI  Constitutional: Negative for unexpected weight change.   Respiratory: Negative for shortness of breath.    Cardiovascular: Negative for leg swelling.  Gastrointestinal: Negative for abdominal distention or abdominal pain. Negative for melena or hematemesis.  Musculoskeletal: Negative for myalgias.    Skin: Negative for jaundice or itching.  Neurological: Negative for confusion or slowed mentation. Negative for tremors.   Hematological: Does not bruise/bleed easily.       Physical Exam   Constitutional: No distress. Alert and oriented.  Eyes: No scleral icterus.   Pulmonary/Chest: Respiratory effort normal. No respiratory distress.   Abdominal: No distension, no ascites appreciated.   Extremities: No edema.   Neurological: No tremor.   Skin: No jaundice.   Psychiatric: Normal mood and affect. Speech, behavior, and thought content normal.       LABS & DIAGNOSTIC STUDIES     I have personally reviewed pertinent laboratory findings:    Lab Results   Component Value Date     (H) 05/06/2024    AST 61 (H) 05/06/2024    ALKPHOS 110 05/06/2024    BILITOT 0.5 05/06/2024    ALBUMIN 3.6 05/06/2024    INR 1.0  "06/02/2021       Lab Results   Component Value Date    WBC 6.54 05/08/2024    HGB 14.5 05/08/2024    HCT 43.6 05/08/2024    MCV 95 05/08/2024     05/08/2024       Lab Results   Component Value Date     05/06/2024    K 4.8 05/06/2024    BUN 25 (H) 05/06/2024    CREATININE 0.9 05/06/2024    ESTGFRAFRICA >60 03/16/2022    EGFRNONAA >60 03/16/2022       Lab Results   Component Value Date    SMOOTHMUSCAB Pos 1:40 (A) 02/25/2010    AMAIFA Negative 02/25/2010    IGGSERUM 972 02/25/2010    FERRITIN 4,230 (H) 06/02/2021    FESATURATED 33 02/25/2010    TIXGU9UMTZID MS 02/25/2010    QDHYV0RPBGSW 101 02/25/2010    CERULOPLSM 25.6 02/25/2010     (H) 06/02/2021     (H) 06/02/2021    HEPBSAG Negative 02/16/2004    HEPCAB Non-reactive 05/08/2024       No results found for: "AFP"      I have personally reviewed the following result reports:  Abdominal US - 5/15/24  CT -   MRI -   EGD -   Colonoscopy -   Liver biopsy -       ASSESSMENT & PLAN     69 y.o. male with:    1. Metabolic dysfunction-associated steatotic liver disease (MASLD)    2. Hepatic steatosis    3. Abnormal liver enzymes    4. Essential hypertension    5. Obesity (BMI 30-39.9)    6. Mixed hyperlipidemia    7. Pre-diabetes          FIB-4 Calculation: 1.74 at 5/30/2024 10:17 AM     FIB-4 below 1.30 is considered as low-risk for advanced fibrosis  FIB-4 over 2.67 is considered as high-risk for advanced fibrosis  FIB-4 values between 1.30 and 2.67 are considered as intermediate-risk of advanced fibrosis for ages 36-64.   For ages > 64 the cut-off for low-risk goes to < 2.      Recommendations:   Obtain Fibroscan for fibrosis and steatosis staging.  Recommend weight loss goal of 7-10% (about 20 lb).   Diet should be low in carbohydrates and added sugars. Recommend increasing protein and fiber intake. Discussed strategies to monitor/reduce calories.   150 min/week of moderate exercise (aerobic + resistance), as tolerated.     Fatty liver " discussion:  - Reviewed risk of hepatic inflammation and subsequent fibrosis (including cirrhosis) from fatty liver.  - The main treatment for fatty liver is weight loss and optimal control of metabolic risk factors (blood pressure, cholesterol, and blood sugar).   - Alcohol use is a risk factor for fatty liver. If no advanced fibrosis, should limit alcohol to max 2 standard drinks/day. Do not recommend daily alcohol use or drinking alcohol most days per week.       Orders Placed This Encounter   Procedures    FibroScan Transplant Hepatology(Vibration Controlled Transient Elastography)         Return to clinic in 3-4 months.      Thank you for allowing me to participate in the care of MARCIE [facility]erra[facility]  Hepatology        Duration of encounter: 45 min  This includes face to face time and non-face to face time preparing to see the patient (eg, review of tests), obtaining and/or reviewing separately obtained history, documenting clinical information in the electronic or other health record, independently interpreting results and communicating results to the patient/family/caregiver, or Care coordination.

## 2024-05-30 NOTE — PRE-PROCEDURE INSTRUCTIONS
Patient stated has not had any problem with anesthesia in the past. Will need medical clearance from your PCP, Dr. Saud Coles. He has a fu appt on 6/3 and he will see if he can get his clearance then. Will need poc, labs and EKG.  Our  will call to set up these appts. He said he takes ASA 81 for prevention.    Preop instructions given. Hold aspirin, aspirin containing products, nsaids(aleve, advil, motrin, ibuprofen, naprosyn, naproxen, voltaren, diclofenac), vitamins and supplements one week prior to surgery.     May take Tylenol.( Also sent to [facility])  Verbalizes understanding.

## 2024-05-30 NOTE — PROCEDURES
FibroScan Transplant Hepatology(Vibration Controlled Transient Elastography)    Date/Time: 5/30/2024 11:15 AM    Performed by: Thania Starr NP  Authorized by: Thania Starr NP    Diagnosis:  NAFLD    Probe:  XL    Universal Protocol: Patient's identity, procedure and site were verified, confirmatory pause was performed.  Discussed procedure including risks and potential complications.  Questions answered.  Patient verbalizes understanding and wishes to proceed with VCTE.     Procedure: After providing explanations of the procedure, patient was placed in the supine position with right arm in maximum abduction to allow optimal exposure of right lateral abdomen.  Patient was briefly assessed, Testing was performed in the mid-axillary location, 50Hz Shear Wave pulses were applied and the resulting Shear Wave and Propagation Speed detected with a 3.5 MHz ultrasonic signal, using the FibroScan probe, Skin to liver capsule distance and liver parenchyma were accessed during the entire examination with the FibroScan probe, Patient was instructed to breathe normally and to abstain from sudden movements during the procedure, allowing for random measurements of liver stiffness. At least 10 Shear Waves were produced, Individual measurements of each Shear Wave were calculated.  Patient tolerated the procedure well with no complications.  Meets discharge criteria as was dismissed.  Rates pain 0 out of 10.  Patient will follow up with ordering provider to review results.    Findings  Median liver stiffness score:  5.8  CAP Reading: dB/m:  294    IQR/med %:  12  Interpretation  Fibrosis interpretation is based on medial liver stiffness - Kilopascal (kPa).    Fibrosis Stage:  F 0-1  Steatosis interpretation is based on controlled attenuation parameter - (dB/m).    Steatosis Grade:  S1

## 2024-05-30 NOTE — PROGRESS NOTES
"CHIEF COMPLAINT:  Low back pain    I, Natalia Chambers, attest that this documentation has been prepared under the direction and in the presence of Umberto Leslie MD.    HPI from 4/8/2024:  Leonardo Pisano is a 68 y.o. male seen today to discuss obtaining a second opinion with Dr. Leslie regarding his progressive low back pain. States that he was followed by Dr. Presley and a MRI of the entire spine was obtained in 2022 which showed multilevel degenerative changes of the neck and low back and a pars defect at L5-S1. It was recommended that the patient proceed with cervical surgery prior to back surgery. He reports he did PT and injections in the past for his lower back which was helpful. He denies any pain in the extremities, weakness in the extremities, numbness/tingling in the extremities, clumsiness, gait dysfunction,  weakness. States that his back pain improved so he decided not to proceed with surgery. Until recently, he has noticed increased leg weakness over the past 6 months. He is taking Aleve with mild relief. Pain is worse with prolonged standing and bending. Pain improves with sitting and elevating his legs. I have ordered an updated MRI of the entire spine and dynamic x-rays to evaluate for worsening stenosis.  The patient would like to follow-up in clinic with Dr. Leslie to discuss surgery in more detail. I have encouraged him to contact the clinic with any questions, concerns, or adverse clinical changes. He verbalized understanding.      Interval Hx:   Today the patient reports that he's been experiencing low back pain for about a year. He states that it gets worse when he's exerting himself and gets worse as the day goes on. He reports that standing up makes the pain much worse. Sitting and elevating his legs relieves the pain. The patient describes the sensation in his lower back as a "tourniquet cutting off the circulation to his legs." He states that he feels burning down the back of his legs " occasionally. He reports that when walking he feels his leg dragging and sometimes needs to use his hands to help him stand up from a seated position. He reports that he was scheduled for neck surgery with a neurosurgeon in [place] on 5/22/2023, but that he started feeling better and cancelled the surgery. He has tried injections in the past and he reports that they relieved his pain for about a year.     Patient denies any other complaints at this time.    Review of patient's allergies indicates:   Allergen Reactions    No known allergies        Past Medical History:   Diagnosis Date    Back pain     Degenerative disc disease     Disorder of kidney and ureter     stone    Essential hypertension 04/03/2018    Hepatitis     Hyperlipidemia     Hypoxia 06/02/2021    Obesity     Pneumonia     Pneumonia due to COVID-19 virus 06/03/2021    Trouble in sleeping     due to pain     Past Surgical History:   Procedure Laterality Date    SHOULDER SURGERY  4/25/12    rt shoulder     Family History   Problem Relation Name Age of Onset    Cancer Mother          liver    Heart disease Father          cabg    Diabetes Daughter      Hyperlipidemia Sister      Hyperlipidemia Sister       Social History     Tobacco Use    Smoking status: Never    Smokeless tobacco: Never   Substance Use Topics    Alcohol use: No        Review of Systems   Musculoskeletal:  Positive for back pain.   Neurological:  Positive for weakness.   All other systems reviewed and are negative.      OBJECTIVE:   Vital Signs:       Physical Exam:    Vital signs: All nursing notes and vital signs reviewed -- afebrile, vital signs stable.  Constitutional: Patient sitting comfortably in chair. Appears well developed and well nourished.  Skin: Exposed areas are intact without abnormal markings, rashes or other lesions.  HEENT: Normocephalic. Normal conjunctivae.  Cardiovascular: Normal rate and regular rhythm.  Respiratory: Chest wall rises and falls symmetrically,  without signs of respiratory distress.  Abdomen: Soft and non-tender.  Extremities: Warm and without edema. Calves supple, non-tender.  Psych/Behavior: Normal affect.    Neurological:    Mental status: Alert and oriented. Conversational and appropriate.       Cranial Nerves: VFF to confrontation. PERRL. EOMI without nystagmus. Facial STLT normal and symmetric. Strong, symmetric muscles of mastication. Facial strength full and symmetric. Hearing equal bilaterally to finger rub. Palate and uvula rise and fall normally in midline. Shoulder shrug 5/5 strength. Tongue midline.     Motor:    Upper:  Deltoids Triceps Biceps WE WF     R 5/5 5/5 5/5 5/5 5/5 5/5    L 5/5 5/5 5/5 5/5 5/5 5/5      Lower:  HF KE KF DF PF EHL    R 5/5 5/5 5/5 5/5 5/5 5/5    L 5/5 5/5 5/5 5/5 5/5 5/5     Sensory: Intact sensation to light touch in all extremities. Romberg negative.    Reflexes:          DTR: 2+ symmetrically throughout.     Dickinson's: Negative.     Babinski's: Right-side positive.      Clonus: Negative.    Cerebellar: Finger-to-nose and rapid alternating movements normal. Gait stable, fluid.    Spine:    Posture: Head well aligned over pelvis in front and side views.  No focal or global spinal deformity visible on inspection. Shoulders and hips even. No obvious leg length discrepancy. No scapula winging.    Bending: Full ROM with forward, back and lateral bending. No rib prominence with forward bend.    Cervical:      ROM: Full with flexion, extension, lateral rotation and ear-to-shoulder bend.      Midline TTP: Negative.     Spurling's test: Negative.     Lhermitte's: Negative.    Thoracic:     Midline TTP: Negative    Lumbar:     Midline TTP: Negative     Straight Leg Test: Negative     Crossed Straight Leg Test: Negative     Sciatic notch tenderness: Negative.    Other:     SI joint TTP: Negative.     Greater trochanter TTP: Negative.     Tenderness with external/internal hip rotation: Negative.    Diagnostic Results:  All  imaging was independently reviewed by me.    MRI cervical-thoracic-lumbar spine, dated 4/24/2024:  1. Broad based disc herniation C5-6 and C6-7 with severe spinal canal stenosis and cord deformation.   2. Lumbaralized S1, S1-2 pars defect with grade 1 spondylolisthesis and L4-S2 bilateral foraminal stenosis.     AP and Lateral X-ray cervical-lumbar spine with flex/ext, dated 4/9/2024:  1. S1-2 pars defect with grade 1 spondylolisthesis, no instability in cervical or lumbar spine.     ASSESSMENT/PLAN:     Leonardo Pisano is a 69 y.o. male presenting for evaluation of imaging for long-standing back pain and leg weakness. Symptoms are consistent with axial mechanical back pain, neurogenic claudication, and weakness likely due to cervical myelopathy. MRI of the whole spine and X-rays of the whole spine reviewed. Findings consistent with C5-6 and C6-7 disc broad based disc herniation with severe canal stenosis and S1-2 pars defect with grade 1 spondylolisthesis and L4-S2 bilateral foraminal stenosis.     The patient understands and agrees with the plan of care. All questions were answered.     1. We'll plan to address cervical pathology first. We'll plan for C5-6 and C6-7 ACDF. We will also put in a referral for pain management.   2. Repeat scoliosis films and CT lumbar spine.   3. Following cervical spine fusion, we'll reevaluate 3 months post op and at that time we'll discuss plan for possible L4-S2 fusion.     I, Dr. Umberto Leslie personally performed the services described in this documentation. All medical record entries made by the scribe, Natalia Chambers, were at my direction and in my presence. I have reviewed the chart and agree that the record reflects my personal performance and is accurate and complete.      Umberto Leslie M.D.  Department of Neurosurgery  [facility]

## 2024-05-31 ENCOUNTER — TELEPHONE (OUTPATIENT)
Dept: NEUROSURGERY | Facility: CLINIC | Age: 69
End: 2024-05-31
Payer: MEDICARE

## 2024-05-31 NOTE — TELEPHONE ENCOUNTER
Called patient to reschedule an upcoming imaging appointments.  Future Appointments   Date Time Provider Department Center   6/3/2024 10:00 AM Saud Coles MD [facility]   6/6/2024 12:15 PM [facility]-CT2 500 LB LIMIT [facility] CTS [facility]   6/6/2024 12:45 PM [facility]-XRAY [facility] XRAY [facility]   6/6/2024  2:00 PM Nurse Nirali Ying RN [facility] S1 PADM [facility]   6/6/2024  3:00 PM LAB, APPOINTMENT [facility] LAB VNP [facility]   6/6/2024  3:15 PM EKG, APPT [facility] EKG [facility]   6/21/2024  9:30 AM Odell Tobar DO [facility] PAINMA [place]   9/17/2024  2:00 PM Thania Starr, NP [facility] HEPAT [facility]        Patient voiced understanding and thanked me.

## 2024-06-01 ENCOUNTER — TELEPHONE (OUTPATIENT)
Dept: NEUROSURGERY | Facility: CLINIC | Age: 69
End: 2024-06-01
Payer: MEDICARE

## 2024-06-01 DIAGNOSIS — Z98.1 S/P CERVICAL SPINAL FUSION: Primary | ICD-10-CM

## 2024-06-03 ENCOUNTER — CLINICAL SUPPORT (OUTPATIENT)
Dept: FAMILY MEDICINE | Facility: CLINIC | Age: 69
End: 2024-06-03
Payer: MEDICARE

## 2024-06-03 ENCOUNTER — APPOINTMENT (OUTPATIENT)
Dept: RADIOLOGY | Facility: CLINIC | Age: 69
End: 2024-06-03
Attending: FAMILY MEDICINE
Payer: MEDICARE

## 2024-06-03 ENCOUNTER — OFFICE VISIT (OUTPATIENT)
Dept: FAMILY MEDICINE | Facility: CLINIC | Age: 69
End: 2024-06-03
Payer: MEDICARE

## 2024-06-03 ENCOUNTER — PATIENT MESSAGE (OUTPATIENT)
Dept: FAMILY MEDICINE | Facility: CLINIC | Age: 69
End: 2024-06-03

## 2024-06-03 VITALS
BODY MASS INDEX: 31.03 KG/M2 | SYSTOLIC BLOOD PRESSURE: 120 MMHG | WEIGHT: 241.75 LBS | HEIGHT: 74 IN | RESPIRATION RATE: 16 BRPM | OXYGEN SATURATION: 99 % | DIASTOLIC BLOOD PRESSURE: 64 MMHG | HEART RATE: 59 BPM

## 2024-06-03 DIAGNOSIS — I10 ESSENTIAL HYPERTENSION: ICD-10-CM

## 2024-06-03 DIAGNOSIS — M48.02 SPINAL STENOSIS OF CERVICAL REGION: ICD-10-CM

## 2024-06-03 DIAGNOSIS — N28.89 OTHER SPECIFIED DISORDERS OF KIDNEY AND URETER: ICD-10-CM

## 2024-06-03 DIAGNOSIS — M48.061 SPINAL STENOSIS OF LUMBAR REGION WITHOUT NEUROGENIC CLAUDICATION: ICD-10-CM

## 2024-06-03 DIAGNOSIS — E78.2 MIXED HYPERLIPIDEMIA: ICD-10-CM

## 2024-06-03 DIAGNOSIS — Z01.818 PREOPERATIVE EVALUATION TO RULE OUT SURGICAL CONTRAINDICATION: Primary | ICD-10-CM

## 2024-06-03 DIAGNOSIS — Z01.818 PREOPERATIVE EVALUATION TO RULE OUT SURGICAL CONTRAINDICATION: ICD-10-CM

## 2024-06-03 DIAGNOSIS — D69.2 SENILE PURPURA: ICD-10-CM

## 2024-06-03 DIAGNOSIS — N28.89 LEFT KIDNEY MASS: ICD-10-CM

## 2024-06-03 LAB
BACTERIA SPEC CULT: NORMAL /HPF
BILIRUB SERPL-MCNC: NORMAL MG/DL
BLOOD URINE, POC: NORMAL
CASTS: NORMAL
COLOR, POC UA: YELLOW
CRYSTALS: NORMAL
GLUCOSE UR QL STRIP: NORMAL
KETONES UR QL STRIP: NORMAL
LEUKOCYTE ESTERASE URINE, POC: NORMAL
NITRITE, POC UA: NORMAL
PH, POC UA: 6
PROTEIN, POC: NORMAL
RBC CELLS COUNTED: NORMAL
SPECIFIC GRAVITY, POC UA: 1.02
UROBILINOGEN, POC UA: 1
WHITE BLOOD CELLS: NORMAL

## 2024-06-03 PROCEDURE — 99999 PR PBB SHADOW E&M-EST. PATIENT-LVL IV: CPT | Mod: PBBFAC,,, | Performed by: FAMILY MEDICINE

## 2024-06-03 PROCEDURE — 1101F PT FALLS ASSESS-DOCD LE1/YR: CPT | Mod: CPTII,S$GLB,, | Performed by: FAMILY MEDICINE

## 2024-06-03 PROCEDURE — 3008F BODY MASS INDEX DOCD: CPT | Mod: CPTII,S$GLB,, | Performed by: FAMILY MEDICINE

## 2024-06-03 PROCEDURE — 3044F HG A1C LEVEL LT 7.0%: CPT | Mod: CPTII,S$GLB,, | Performed by: FAMILY MEDICINE

## 2024-06-03 PROCEDURE — 3074F SYST BP LT 130 MM HG: CPT | Mod: CPTII,S$GLB,, | Performed by: FAMILY MEDICINE

## 2024-06-03 PROCEDURE — 36415 COLL VENOUS BLD VENIPUNCTURE: CPT | Mod: S$GLB,,, | Performed by: FAMILY MEDICINE

## 2024-06-03 PROCEDURE — 1126F AMNT PAIN NOTED NONE PRSNT: CPT | Mod: CPTII,S$GLB,, | Performed by: FAMILY MEDICINE

## 2024-06-03 PROCEDURE — 1159F MED LIST DOCD IN RCRD: CPT | Mod: CPTII,S$GLB,, | Performed by: FAMILY MEDICINE

## 2024-06-03 PROCEDURE — 81000 URINALYSIS NONAUTO W/SCOPE: CPT | Mod: S$GLB,,, | Performed by: FAMILY MEDICINE

## 2024-06-03 PROCEDURE — 71046 X-RAY EXAM CHEST 2 VIEWS: CPT | Mod: TC,PO

## 2024-06-03 PROCEDURE — 99214 OFFICE O/P EST MOD 30 MIN: CPT | Mod: S$GLB,,, | Performed by: FAMILY MEDICINE

## 2024-06-03 PROCEDURE — 71046 X-RAY EXAM CHEST 2 VIEWS: CPT | Mod: 26,,, | Performed by: RADIOLOGY

## 2024-06-03 PROCEDURE — 4010F ACE/ARB THERAPY RXD/TAKEN: CPT | Mod: CPTII,S$GLB,, | Performed by: FAMILY MEDICINE

## 2024-06-03 PROCEDURE — 3078F DIAST BP <80 MM HG: CPT | Mod: CPTII,S$GLB,, | Performed by: FAMILY MEDICINE

## 2024-06-03 PROCEDURE — 1160F RVW MEDS BY RX/DR IN RCRD: CPT | Mod: CPTII,S$GLB,, | Performed by: FAMILY MEDICINE

## 2024-06-03 PROCEDURE — 3288F FALL RISK ASSESSMENT DOCD: CPT | Mod: CPTII,S$GLB,, | Performed by: FAMILY MEDICINE

## 2024-06-03 NOTE — Clinical Note
He's medically cleared for surgery.  However, US and MRI incidentally found a left renal cyst vs mass.   Patient wants this worked up.  I ordered a ct of the kidney.  Hopefully its a cyst and won't delay his surgery

## 2024-06-03 NOTE — PROGRESS NOTES
Subjective:       Patient ID: Leonardo Pisano is a 69 y.o. male.    Chief Complaint: Follow-up (Patient is here today for a 6 month follow up visit. )    70 y/o male with HTN, dyslipidemia here for surgical clearance for neck surgery per Dr Leslie.    He is also worried about a possible kidney mass vs cyst seen on US and MRI.  Radiology recommends a CT scan of the kidney to further investigate this.  These were incidental findings.  Patient has a fatty liver and had a workup with hepatology Clinic.  Ultrasound was done which also revealed possible cyst versus mass of the kidney.  He has a history of hyperlipidemia.  He has a history of myalgias from taking a statin.    He tolerates his blood pressure medication well and is not having side effects such as chronic cough or dizziness.  He is having ED issues  Patient saw cardiologist.   Recommended a statin.  He started Crestor which increased his LFTs.  Crestor.  But is LFTs decreased a small amount.    Follow-up  Associated symptoms include arthralgias and neck pain. Pertinent negatives include no chest pain, chills, congestion, fever, nausea, sore throat or vomiting.     Review of Systems   Constitutional:  Negative for chills and fever.   HENT: Negative.  Negative for nasal congestion, rhinorrhea and sore throat.    Eyes: Negative.    Respiratory:  Negative for shortness of breath.    Cardiovascular:  Negative for chest pain.   Gastrointestinal:  Negative for diarrhea, nausea and vomiting.   Endocrine: Negative.    Genitourinary: Negative.    Musculoskeletal:  Positive for arthralgias, back pain and neck pain.   Integumentary:  Negative.   Neurological:  Negative for dizziness, tremors and light-headedness.         Objective:      Vitals:    06/03/24 0947   BP: 120/64   Pulse: (!) 59   Resp: 16        Physical Exam  Constitutional:       Appearance: Normal appearance.   HENT:      Head: Normocephalic and atraumatic.      Right Ear: Tympanic membrane, ear canal and  external ear normal.      Left Ear: Tympanic membrane, ear canal and external ear normal.      Nose: Nose normal.      Mouth/Throat:      Mouth: Mucous membranes are moist.   Eyes:      Conjunctiva/sclera: Conjunctivae normal.   Cardiovascular:      Rate and Rhythm: Normal rate and regular rhythm.      Pulses: Normal pulses.      Heart sounds: Normal heart sounds.   Pulmonary:      Effort: Pulmonary effort is normal.      Breath sounds: Normal breath sounds.   Abdominal:      General: Abdomen is flat.      Palpations: Abdomen is soft.      Tenderness: There is no right CVA tenderness or left CVA tenderness.   Musculoskeletal:         General: Normal range of motion.      Cervical back: Normal range of motion and neck supple.      Right lower leg: No edema.      Left lower leg: No edema.   Skin:     General: Skin is warm and dry.      Findings: Bruising present.   Neurological:      General: No focal deficit present.      Mental Status: He is alert and oriented to person, place, and time. Mental status is at baseline.      Cranial Nerves: No cranial nerve deficit.      Gait: Gait normal.   Psychiatric:         Mood and Affect: Mood normal.         Behavior: Behavior normal.         Judgment: Judgment normal.         BMP  Lab Results   Component Value Date     05/06/2024    K 4.8 05/06/2024     05/06/2024    CO2 26 05/06/2024    BUN 25 (H) 05/06/2024    CREATININE 0.9 05/06/2024    CALCIUM 9.4 05/06/2024    ANIONGAP 6 (L) 05/06/2024    EGFRNORACEVR >60 05/06/2024     Lab Results   Component Value Date    LDLCALC 132.4 05/06/2024     PSA, Screen (ng/mL)   Date Value   05/06/2024 0.46     Chest x-ray unremarkable  Urinalysis is normal  Assessment:       Problem List Items Addressed This Visit          Neuro    Spinal stenosis of cervical region    Spinal stenosis of lumbar region without neurogenic claudication       Cardiac/Vascular    Essential hypertension    Mixed hyperlipidemia       Hematology     Senile purpura     Other Visit Diagnoses       Preoperative evaluation to rule out surgical contraindication    -  Primary    Patient is medically cleared for surgery.  Still awaiting EKG, INR.  Blood work last month was normal.    Relevant Orders    X-Ray Chest PA And Lateral (Completed)    POCT urinalysis, dipstick or tablet reag (Completed)    POCT URINE SEDIMENT EXAM (Completed)    Other specified disorders of kidney and ureter        Relevant Orders    CT Abdomen With Without Contrast    Left kidney mass        This was seen on MRI of the lumbar spine and ultrasound of the abdomen.  It looks more like a cyst.  I will order a CT scan with contrast and go from there.    Relevant Orders    CT Abdomen With Without Contrast            Plan:       1. Preoperative evaluation to rule out surgical contraindication  Comments:  Patient is medically cleared for surgery.  Still awaiting EKG, INR.  Blood work last month was normal.  Orders:  -     X-Ray Chest PA And Lateral; Future; Expected date: 06/03/2024  -     POCT urinalysis, dipstick or tablet reag  -     POCT URINE SEDIMENT EXAM    2. Spinal stenosis of lumbar region without neurogenic claudication  Overview:  Patient seeing neurologist.      3. Other specified disorders of kidney and ureter  -     CT Abdomen With Without Contrast; Future; Expected date: 06/03/2024    4. Spinal stenosis of cervical region  Overview:  Scheduled for neurosurgery in 2 weeks.  Needs surgical clearance.      5. Essential hypertension  Overview:  Two gram sodium diet.    Weight loss discussed.    Try to walk 2 miles per day.    Avoid smoking.    Current medications will be:  Lisinopril 10 mg daily      6. Mixed hyperlipidemia  Overview:  Low fat diet.  Avoid sweets.  A 10 pound weight loss by the next visit as a  good goal.  Increase consumption of fruits and vegetables, fish and chicken.  Crestor caused elevated LFTs.  Consider restarting Crestor if LFTs returned to normal.      7. Senile  purpura  Overview:  Avoid bumping into objects which would cause bruising.      8. Left kidney mass  Comments:  This was seen on MRI of the lumbar spine and ultrasound of the abdomen.  It looks more like a cyst.  I will order a CT scan with contrast and go from there.  Orders:  -     CT Abdomen With Without Contrast; Future; Expected date: 06/03/2024          RTC in 6 months

## 2024-06-04 RX ORDER — LISINOPRIL 5 MG/1
5 TABLET ORAL DAILY
Qty: 90 TABLET | Refills: 1 | Status: SHIPPED | OUTPATIENT
Start: 2024-06-04

## 2024-06-06 ENCOUNTER — HOSPITAL ENCOUNTER (OUTPATIENT)
Dept: RADIOLOGY | Facility: HOSPITAL | Age: 69
Discharge: HOME | End: 2024-06-06
Attending: STUDENT IN AN ORGANIZED HEALTH CARE EDUCATION/TRAINING PROGRAM
Payer: MEDICARE

## 2024-06-06 ENCOUNTER — HOSPITAL ENCOUNTER (OUTPATIENT)
Dept: CARDIOLOGY | Facility: CLINIC | Age: 69
Discharge: HOME | End: 2024-06-06
Payer: MEDICARE

## 2024-06-06 ENCOUNTER — HOSPITAL ENCOUNTER (OUTPATIENT)
Dept: PREADMISSION TESTING | Facility: HOSPITAL | Age: 69
Discharge: HOME | End: 2024-06-06
Attending: NEUROLOGICAL SURGERY
Payer: MEDICARE

## 2024-06-06 VITALS
BODY MASS INDEX: 31.14 KG/M2 | SYSTOLIC BLOOD PRESSURE: 133 MMHG | DIASTOLIC BLOOD PRESSURE: 76 MMHG | WEIGHT: 242.5 LBS | HEART RATE: 66 BPM | OXYGEN SATURATION: 97 %

## 2024-06-06 DIAGNOSIS — M43.10 SPONDYLOLISTHESIS, UNSPECIFIED SPINAL REGION: ICD-10-CM

## 2024-06-06 DIAGNOSIS — Z01.818 PREOPERATIVE TESTING: ICD-10-CM

## 2024-06-06 LAB
OHS QRS DURATION: 80 MS
OHS QTC CALCULATION: 378 MS

## 2024-06-06 PROCEDURE — 72082 X-RAY EXAM ENTIRE SPI 2/3 VW: CPT | Mod: 26,,, | Performed by: RADIOLOGY

## 2024-06-06 PROCEDURE — 72131 CT LUMBAR SPINE W/O DYE: CPT | Mod: 26,,, | Performed by: RADIOLOGY

## 2024-06-06 PROCEDURE — 93010 ELECTROCARDIOGRAM REPORT: CPT | Mod: S$GLB,,, | Performed by: INTERNAL MEDICINE

## 2024-06-06 PROCEDURE — 72082 X-RAY EXAM ENTIRE SPI 2/3 VW: CPT | Mod: TC

## 2024-06-06 PROCEDURE — 93005 ELECTROCARDIOGRAM TRACING: CPT | Mod: S$GLB,,, | Performed by: ANESTHESIOLOGY

## 2024-06-06 PROCEDURE — 72131 CT LUMBAR SPINE W/O DYE: CPT | Mod: TC

## 2024-06-06 NOTE — DISCHARGE INSTRUCTIONS
Your surgery has been scheduled for:________6/12/24__________________________________    You should report to:  ____[facility], located on the [place] side of the first floor of the           [facility] ([phone])  _X___[facility], located on the [facility] side of the            Second floor of the [facility] ([phone])  ____3rd Floor [facility] located on the [facility] side of the [facility] [phone]  ____[facility]: located at [address] [place] [address]. [address]     Please Note   Tell your doctor if you take Aspirin, products containing Aspirin, herbal medications  or blood thinners, such as Coumadin, Ticlid, or Plavix.  (Consult your provider regarding holding or stopping before surgery).  Arrange for someone to drive you home following surgery.  You will not be allowed to leave the surgical facility alone or drive yourself home following sedation and anesthesia.        Before Surgery  Stop taking all herbal medications, vitamins, and supplements 7 days prior to surgery  No Motrin/Advil (Ibuprofen) 7 days before surgery  No Aleve (Naproxen) 7 days before surgery  Stop Taking Asprin, products containing Asprin __7___days before surgery  Stop taking blood thinners_______days before surgery  No Goody's/BC  Powder 7 days before surgery  Refrain from drinking alcoholic beverages for 24hours before and after surgery  Stop or limit smoking ____7_____days before surgery  You may take Tylenol for pain    Night before Surgery  Do not eat or drink after midnight  Take a shower or bath (shower is recommended).  Bathe with Hibiclens soap or an antibacterial soap from the neck down.  If not supplied by your surgeon, hibiclens soap will need to be purchased over the counter in pharmacy.  Rinse soap off thoroughly.  Shampoo your hair with your regular shampoo    The Day of  Surgery  Take another bath or shower with hibiclens or any antibacterial soap, to reduce the chance of infection.  Take heart and blood pressure medications with a small sip of water, as advised by the perioperative team.  Do not take fluid pills  You may brush your teeth and rinse your mouth, but do not swall any additional water.   Do not apply perfumes, powder, body lotions or deodorant on the day of surgery.  Nail polish should be removed.  Do not wear makeup or moisturizer  Wear comfortable clothes, such as a button front shirt and loose fitting pants.  Leave all jewelry, including body piercings, and valuables at home.    Bring any devices you will neeed after surgery such as crutches or canes.  If you have sleep apnea, please bring your CPAP machine  In the event that your physical condition changes including the onset of a cold or respiratory illness, or if you have to delay or cancel your surgery, please notify your surgeon.     Anesthesia: General Anesthesia     You are watched continuously during your procedure by your anesthesia provider.     Youre due to have surgery. During surgery, youll be given medicine called anesthesia or anesthetic. This will keep you comfortable and pain-free. Your anesthesia provider will use general anesthesia.  What is general anesthesia?  General anesthesia puts you into a state like deep sleep. It goes into the bloodstream (IV anesthetics), into the lungs (gas anesthetics), or both. You feel nothing during the procedure. You will not remember it. During the procedure, the anesthesia provider monitors you continuously. He or she checks your heart rate and rhythm, blood pressure, breathing, and blood oxygen.  IV anesthetics. IV anesthetics are given through an IV line in your arm. Theyre often given first. This is so you are asleep before a gas anesthetic is started. Some kinds of IV anesthetics relieve pain. Others relax you. Your doctor will decide which kind is best in  your case.  Gas anesthetics. Gas anesthetics are breathed into the lungs. They are often used to keep you asleep. They can be given through a facemask or a tube placed in your larynx or trachea (breathing tube).  If you have a facemask, your anesthesia provider will most likely place it over your nose and mouth while youre still awake. Youll breathe oxygen through the mask as your IV anesthetic is started. Gas anesthetic may be added through the mask.  If you have a tube in the larynx or trachea, it will be inserted into your throat after youre asleep.  Anesthesia tools and medicines  You will likely have:  IV anesthetics. These are put into an IV line into your bloodstream.  Gas anesthetics. You breathe these anesthetics into your lungs, where they pass into your bloodstream.  Pulse oximeter. This is a small clip that is attached to the end of your finger. This measures your blood oxygen level.  Electrocardiography leads (electrodes). These are small sticky pads that are placed on your chest. They record your heart rate and rhythm.  Blood pressure cuff. This reads your blood pressure.  Risks and possible complications  General anesthesia has some risks. These include:  Breathing problems  Nausea and vomiting  Sore throat or hoarseness (usually temporary)  Allergic reaction to the anesthetic  Irregular heartbeat (rare)  Cardiac arrest (rare)   Anesthesia safety  Follow all instructions you are given for how long not to eat or drink before your procedure.  Be sure your doctor knows what medicines and drugs you take. This includes over-the-counter medicines, herbs, supplements, alcohol or other drugs. You will be asked when those were last taken.  Have an adult family member or friend drive you home after the procedure.  For the first 24 hours after your surgery:  Do not drive or use heavy equipment.  Do not make important decisions or sign legal documents. If important decisions or signing legal documents is  necessary during the first 24 hours after surgery, have a trusted family member or spouse act on your behalf.  Avoid alcohol.  Have a responsible adult stay with you. He or she can watch for problems and help keep you safe.  Date Last Reviewed: 12/1/2016 © 2000-2017 [employer] [address] [place] [address], [place] [address]. All rights reserved. This information is not intended as a substitute for professional medical care. Always follow your healthcare professional's instructions.

## 2024-06-07 ENCOUNTER — TELEPHONE (OUTPATIENT)
Dept: NEUROSURGERY | Facility: CLINIC | Age: 69
End: 2024-06-07
Payer: MEDICARE

## 2024-06-07 ENCOUNTER — PATIENT MESSAGE (OUTPATIENT)
Dept: NEUROSURGERY | Facility: CLINIC | Age: 69
End: 2024-06-07
Payer: MEDICARE

## 2024-06-07 NOTE — TELEPHONE ENCOUNTER
Phone call to patient with pre op instructions.  Patient instructed to remain NPO after midnight Tuesday  , to bathe in hibicleanse or dial antibacterial soap tues nite  and wed morning before arrival, and to arrive on 2nd floor [facility]  at 2pm on wed. June 12, 2024 . Pt. Did not answer, but above info left on v/m and message sent via portal.

## 2024-06-10 ENCOUNTER — HOSPITAL ENCOUNTER (OUTPATIENT)
Dept: RADIOLOGY | Facility: HOSPITAL | Age: 69
Discharge: HOME | End: 2024-06-10
Attending: FAMILY MEDICINE
Payer: MEDICARE

## 2024-06-10 DIAGNOSIS — N28.89 LEFT KIDNEY MASS: ICD-10-CM

## 2024-06-10 DIAGNOSIS — N28.89 OTHER SPECIFIED DISORDERS OF KIDNEY AND URETER: ICD-10-CM

## 2024-06-10 PROCEDURE — 74170 CT ABD WO CNTRST FLWD CNTRST: CPT | Mod: TC

## 2024-06-10 PROCEDURE — 25500020 PHARM REV CODE 255: Performed by: FAMILY MEDICINE

## 2024-06-10 RX ADMIN — IOHEXOL 75 ML: 350 INJECTION, SOLUTION INTRAVENOUS at 02:06

## 2024-06-11 NOTE — PROGRESS NOTES
Tell patient that he has kidney stones but on mass.  He has a 9 mm bladder stone.  If it becomes symptomatic it may need to be removed.

## 2024-06-12 ENCOUNTER — ANESTHESIA EVENT (OUTPATIENT)
Dept: SURGERY | Facility: HOSPITAL | Age: 69
End: 2024-06-12
Payer: MEDICARE

## 2024-06-12 ENCOUNTER — ANESTHESIA (OUTPATIENT)
Dept: SURGERY | Facility: HOSPITAL | Age: 69
End: 2024-06-12
Payer: MEDICARE

## 2024-06-12 ENCOUNTER — HOSPITAL ENCOUNTER (OUTPATIENT)
Facility: HOSPITAL | Age: 69
Discharge: HOME | End: 2024-06-13
Attending: NEUROLOGICAL SURGERY | Admitting: NEUROLOGICAL SURGERY
Payer: MEDICARE

## 2024-06-12 DIAGNOSIS — G95.9 CERVICAL MYELOPATHY: ICD-10-CM

## 2024-06-12 PROCEDURE — 36620 INSERTION CATHETER ARTERY: CPT | Mod: 59,,, | Performed by: ANESTHESIOLOGY

## 2024-06-12 PROCEDURE — 27800903 OPTIME MED/SURG SUP & DEVICES OTHER IMPLANTS: Performed by: NEUROLOGICAL SURGERY

## 2024-06-12 PROCEDURE — 71000015 HC POSTOP RECOV 1ST HR: Performed by: NEUROLOGICAL SURGERY

## 2024-06-12 PROCEDURE — 25000003 PHARM REV CODE 250: Performed by: NURSE ANESTHETIST, CERTIFIED REGISTERED

## 2024-06-12 PROCEDURE — C1713 ANCHOR/SCREW BN/BN,TIS/BN: HCPCS | Performed by: NEUROLOGICAL SURGERY

## 2024-06-12 PROCEDURE — 27201423 OPTIME MED/SURG SUP & DEVICES STERILE SUPPLY: Performed by: NEUROLOGICAL SURGERY

## 2024-06-12 PROCEDURE — 36000710: Performed by: NEUROLOGICAL SURGERY

## 2024-06-12 PROCEDURE — 63600175 PHARM REV CODE 636 W HCPCS: Performed by: NEUROLOGICAL SURGERY

## 2024-06-12 PROCEDURE — 25000003 PHARM REV CODE 250: Performed by: NEUROLOGICAL SURGERY

## 2024-06-12 PROCEDURE — 22551 ARTHRD ANT NTRBDY CERVICAL: CPT | Mod: ,,, | Performed by: NEUROLOGICAL SURGERY

## 2024-06-12 PROCEDURE — 63600175 PHARM REV CODE 636 W HCPCS: Performed by: NURSE ANESTHETIST, CERTIFIED REGISTERED

## 2024-06-12 PROCEDURE — 25000003 PHARM REV CODE 250: Performed by: STUDENT IN AN ORGANIZED HEALTH CARE EDUCATION/TRAINING PROGRAM

## 2024-06-12 PROCEDURE — 22552 ARTHRD ANT NTRBD CERVICAL EA: CPT | Mod: ,,, | Performed by: NEUROLOGICAL SURGERY

## 2024-06-12 PROCEDURE — 63600175 PHARM REV CODE 636 W HCPCS: Performed by: STUDENT IN AN ORGANIZED HEALTH CARE EDUCATION/TRAINING PROGRAM

## 2024-06-12 PROCEDURE — 71000016 HC POSTOP RECOV ADDL HR: Performed by: NEUROLOGICAL SURGERY

## 2024-06-12 PROCEDURE — 37000009 HC ANESTHESIA EA ADD 15 MINS: Performed by: NEUROLOGICAL SURGERY

## 2024-06-12 PROCEDURE — 20930 SP BONE ALGRFT MORSEL ADD-ON: CPT | Mod: ,,, | Performed by: NEUROLOGICAL SURGERY

## 2024-06-12 PROCEDURE — 37000008 HC ANESTHESIA 1ST 15 MINUTES: Performed by: NEUROLOGICAL SURGERY

## 2024-06-12 PROCEDURE — 36000711: Performed by: NEUROLOGICAL SURGERY

## 2024-06-12 PROCEDURE — 71000033 HC RECOVERY, INTIAL HOUR: Performed by: NEUROLOGICAL SURGERY

## 2024-06-12 PROCEDURE — 22853 INSJ BIOMECHANICAL DEVICE: CPT | Mod: ,,, | Performed by: NEUROLOGICAL SURGERY

## 2024-06-12 DEVICE — SPACER COALITION MIS 6X12X14MM: Type: IMPLANTABLE DEVICE | Site: SPINE CERVICAL | Status: FUNCTIONAL

## 2024-06-12 DEVICE — PUTTY OSTEOSELECT SYR 2.5CC: Type: IMPLANTABLE DEVICE | Site: SPINE CERVICAL | Status: FUNCTIONAL

## 2024-06-12 DEVICE — SCREW BONE VARIABLE SD 3.6X16: Type: IMPLANTABLE DEVICE | Site: SPINE CERVICAL | Status: FUNCTIONAL

## 2024-06-12 RX ORDER — SODIUM CHLORIDE 9 MG/ML
INJECTION, SOLUTION INTRAVENOUS CONTINUOUS
Status: DISCONTINUED | OUTPATIENT
Start: 2024-06-12 | End: 2024-06-12

## 2024-06-12 RX ORDER — OXYCODONE HYDROCHLORIDE 10 MG/1
10 TABLET ORAL EVERY 4 HOURS PRN
Status: DISCONTINUED | OUTPATIENT
Start: 2024-06-12 | End: 2024-06-13 | Stop reason: HOSPADM

## 2024-06-12 RX ORDER — FENTANYL CITRATE 50 UG/ML
INJECTION, SOLUTION INTRAMUSCULAR; INTRAVENOUS
Status: DISCONTINUED | OUTPATIENT
Start: 2024-06-12 | End: 2024-06-12

## 2024-06-12 RX ORDER — KETAMINE HCL IN 0.9 % NACL 50 MG/5 ML
SYRINGE (ML) INTRAVENOUS
Status: DISCONTINUED | OUTPATIENT
Start: 2024-06-12 | End: 2024-06-12

## 2024-06-12 RX ORDER — EPHEDRINE SULFATE 50 MG/ML
INJECTION, SOLUTION INTRAVENOUS
Status: DISCONTINUED | OUTPATIENT
Start: 2024-06-12 | End: 2024-06-12

## 2024-06-12 RX ORDER — ONDANSETRON 8 MG/1
8 TABLET, ORALLY DISINTEGRATING ORAL EVERY 6 HOURS PRN
Status: DISCONTINUED | OUTPATIENT
Start: 2024-06-12 | End: 2024-06-13 | Stop reason: HOSPADM

## 2024-06-12 RX ORDER — ALUMINUM HYDROXIDE, MAGNESIUM HYDROXIDE, AND SIMETHICONE 1200; 120; 1200 MG/30ML; MG/30ML; MG/30ML
30 SUSPENSION ORAL EVERY 4 HOURS PRN
Status: DISCONTINUED | OUTPATIENT
Start: 2024-06-12 | End: 2024-06-13 | Stop reason: HOSPADM

## 2024-06-12 RX ORDER — MUPIROCIN 20 MG/G
1 OINTMENT TOPICAL 2 TIMES DAILY
Status: DISCONTINUED | OUTPATIENT
Start: 2024-06-12 | End: 2024-06-12 | Stop reason: HOSPADM

## 2024-06-12 RX ORDER — DEXAMETHASONE SODIUM PHOSPHATE 4 MG/ML
INJECTION, SOLUTION INTRA-ARTICULAR; INTRALESIONAL; INTRAMUSCULAR; INTRAVENOUS; SOFT TISSUE
Status: DISCONTINUED | OUTPATIENT
Start: 2024-06-12 | End: 2024-06-12

## 2024-06-12 RX ORDER — AMOXICILLIN 250 MG
2 CAPSULE ORAL NIGHTLY PRN
Status: DISCONTINUED | OUTPATIENT
Start: 2024-06-12 | End: 2024-06-13 | Stop reason: HOSPADM

## 2024-06-12 RX ORDER — METHYLPREDNISOLONE ACETATE 40 MG/ML
INJECTION, SUSPENSION INTRA-ARTICULAR; INTRALESIONAL; INTRAMUSCULAR; SOFT TISSUE
Status: DISCONTINUED | OUTPATIENT
Start: 2024-06-12 | End: 2024-06-12 | Stop reason: HOSPADM

## 2024-06-12 RX ORDER — OXYCODONE HYDROCHLORIDE 5 MG/1
5 TABLET ORAL EVERY 4 HOURS PRN
Status: DISCONTINUED | OUTPATIENT
Start: 2024-06-12 | End: 2024-06-13 | Stop reason: HOSPADM

## 2024-06-12 RX ORDER — PROPOFOL 10 MG/ML
VIAL (ML) INTRAVENOUS
Status: DISCONTINUED | OUTPATIENT
Start: 2024-06-12 | End: 2024-06-12

## 2024-06-12 RX ORDER — LIDOCAINE HYDROCHLORIDE 20 MG/ML
INJECTION INTRAVENOUS
Status: DISCONTINUED | OUTPATIENT
Start: 2024-06-12 | End: 2024-06-12

## 2024-06-12 RX ORDER — BISACODYL 10 MG/1
10 SUPPOSITORY RECTAL DAILY
Status: DISCONTINUED | OUTPATIENT
Start: 2024-06-13 | End: 2024-06-13 | Stop reason: HOSPADM

## 2024-06-12 RX ORDER — SODIUM CHLORIDE 9 MG/ML
INJECTION, SOLUTION INTRAVENOUS CONTINUOUS
Status: DISCONTINUED | OUTPATIENT
Start: 2024-06-12 | End: 2024-06-13

## 2024-06-12 RX ORDER — LIDOCAINE HYDROCHLORIDE AND EPINEPHRINE 10; 10 MG/ML; UG/ML
INJECTION, SOLUTION INFILTRATION; PERINEURAL
Status: DISCONTINUED | OUTPATIENT
Start: 2024-06-12 | End: 2024-06-12 | Stop reason: HOSPADM

## 2024-06-12 RX ORDER — PROCHLORPERAZINE EDISYLATE 5 MG/ML
5 INJECTION INTRAMUSCULAR; INTRAVENOUS EVERY 6 HOURS PRN
Status: DISCONTINUED | OUTPATIENT
Start: 2024-06-12 | End: 2024-06-13 | Stop reason: HOSPADM

## 2024-06-12 RX ORDER — DEXMEDETOMIDINE HYDROCHLORIDE 100 UG/ML
INJECTION, SOLUTION INTRAVENOUS
Status: DISCONTINUED | OUTPATIENT
Start: 2024-06-12 | End: 2024-06-12

## 2024-06-12 RX ORDER — MIDAZOLAM HYDROCHLORIDE 1 MG/ML
INJECTION INTRAMUSCULAR; INTRAVENOUS
Status: DISCONTINUED | OUTPATIENT
Start: 2024-06-12 | End: 2024-06-12

## 2024-06-12 RX ORDER — MORPHINE SULFATE 2 MG/ML
1 INJECTION, SOLUTION INTRAMUSCULAR; INTRAVENOUS
Status: DISCONTINUED | OUTPATIENT
Start: 2024-06-12 | End: 2024-06-13 | Stop reason: HOSPADM

## 2024-06-12 RX ORDER — LISINOPRIL 5 MG/1
5 TABLET ORAL DAILY
Status: DISCONTINUED | OUTPATIENT
Start: 2024-06-13 | End: 2024-06-13 | Stop reason: HOSPADM

## 2024-06-12 RX ORDER — PROPOFOL 10 MG/ML
VIAL (ML) INTRAVENOUS CONTINUOUS PRN
Status: DISCONTINUED | OUTPATIENT
Start: 2024-06-12 | End: 2024-06-12

## 2024-06-12 RX ORDER — MUPIROCIN 20 MG/G
OINTMENT TOPICAL 2 TIMES DAILY
Status: DISCONTINUED | OUTPATIENT
Start: 2024-06-12 | End: 2024-06-13 | Stop reason: HOSPADM

## 2024-06-12 RX ORDER — SUCCINYLCHOLINE CHLORIDE 20 MG/ML
INJECTION INTRAMUSCULAR; INTRAVENOUS
Status: DISCONTINUED | OUTPATIENT
Start: 2024-06-12 | End: 2024-06-12

## 2024-06-12 RX ORDER — ACETAMINOPHEN 500 MG
1000 TABLET ORAL EVERY 6 HOURS
Status: DISCONTINUED | OUTPATIENT
Start: 2024-06-13 | End: 2024-06-13 | Stop reason: HOSPADM

## 2024-06-12 RX ORDER — SODIUM CHLORIDE 0.9 % (FLUSH) 0.9 %
3 SYRINGE (ML) INJECTION
Status: DISCONTINUED | OUTPATIENT
Start: 2024-06-12 | End: 2024-06-12 | Stop reason: HOSPADM

## 2024-06-12 RX ORDER — MUPIROCIN 20 MG/G
OINTMENT TOPICAL
Status: DISCONTINUED | OUTPATIENT
Start: 2024-06-12 | End: 2024-06-12 | Stop reason: HOSPADM

## 2024-06-12 RX ORDER — ONDANSETRON HYDROCHLORIDE 2 MG/ML
INJECTION, SOLUTION INTRAVENOUS
Status: DISCONTINUED | OUTPATIENT
Start: 2024-06-12 | End: 2024-06-12

## 2024-06-12 RX ORDER — HYDROMORPHONE HYDROCHLORIDE 1 MG/ML
0.2 INJECTION, SOLUTION INTRAMUSCULAR; INTRAVENOUS; SUBCUTANEOUS EVERY 5 MIN PRN
Status: DISCONTINUED | OUTPATIENT
Start: 2024-06-12 | End: 2024-06-12 | Stop reason: HOSPADM

## 2024-06-12 RX ADMIN — CEFAZOLIN 2 G: 2 INJECTION, POWDER, FOR SOLUTION INTRAMUSCULAR; INTRAVENOUS at 05:06

## 2024-06-12 RX ADMIN — SODIUM CHLORIDE, SODIUM ACETATE ANHYDROUS, SODIUM GLUCONATE, POTASSIUM CHLORIDE, AND MAGNESIUM CHLORIDE: 526; 222; 502; 37; 30 INJECTION, SOLUTION INTRAVENOUS at 05:06

## 2024-06-12 RX ADMIN — Medication 100 MG: at 04:06

## 2024-06-12 RX ADMIN — EPHEDRINE SULFATE 10 MG: 50 INJECTION INTRAVENOUS at 06:06

## 2024-06-12 RX ADMIN — FENTANYL CITRATE 100 MCG: 50 INJECTION, SOLUTION INTRAMUSCULAR; INTRAVENOUS at 04:06

## 2024-06-12 RX ADMIN — SODIUM CHLORIDE: 9 INJECTION, SOLUTION INTRAVENOUS at 02:06

## 2024-06-12 RX ADMIN — EPHEDRINE SULFATE 10 MG: 50 INJECTION INTRAVENOUS at 05:06

## 2024-06-12 RX ADMIN — LIDOCAINE HYDROCHLORIDE 80 MG: 20 INJECTION INTRAVENOUS at 04:06

## 2024-06-12 RX ADMIN — MIDAZOLAM HYDROCHLORIDE 2 MG: 1 INJECTION, SOLUTION INTRAMUSCULAR; INTRAVENOUS at 04:06

## 2024-06-12 RX ADMIN — OXYCODONE 5 MG: 5 TABLET ORAL at 10:06

## 2024-06-12 RX ADMIN — MUPIROCIN: 20 OINTMENT TOPICAL at 09:06

## 2024-06-12 RX ADMIN — PROPOFOL 125 MCG/KG/MIN: 10 INJECTION, EMULSION INTRAVENOUS at 04:06

## 2024-06-12 RX ADMIN — SODIUM CHLORIDE: 0.9 INJECTION, SOLUTION INTRAVENOUS at 04:06

## 2024-06-12 RX ADMIN — PHENYLEPHRINE HYDROCHLORIDE 0.2 MCG/KG/MIN: 10 INJECTION INTRAVENOUS at 06:06

## 2024-06-12 RX ADMIN — SODIUM CHLORIDE 0.2 MCG/KG/MIN: 9 INJECTION, SOLUTION INTRAVENOUS at 04:06

## 2024-06-12 RX ADMIN — PHENYLEPHRINE HYDROCHLORIDE 0.2 MCG/KG/MIN: 10 INJECTION INTRAVENOUS at 05:06

## 2024-06-12 RX ADMIN — Medication 20 MG: at 04:06

## 2024-06-12 RX ADMIN — DEXMEDETOMIDINE 12 MCG: 100 INJECTION, SOLUTION, CONCENTRATE INTRAVENOUS at 04:06

## 2024-06-12 RX ADMIN — Medication 100 MG: at 05:06

## 2024-06-12 RX ADMIN — SUCCINYLCHOLINE CHLORIDE 200 MG: 20 INJECTION, SOLUTION INTRAMUSCULAR; INTRAVENOUS at 04:06

## 2024-06-12 RX ADMIN — MUPIROCIN: 20 OINTMENT TOPICAL at 02:06

## 2024-06-12 RX ADMIN — ONDANSETRON 4 MG: 2 INJECTION INTRAMUSCULAR; INTRAVENOUS at 06:06

## 2024-06-12 RX ADMIN — DEXAMETHASONE SODIUM PHOSPHATE 12 MG: 4 INJECTION, SOLUTION INTRAMUSCULAR; INTRAVENOUS at 05:06

## 2024-06-12 RX ADMIN — DEXMEDETOMIDINE 8 MCG: 100 INJECTION, SOLUTION, CONCENTRATE INTRAVENOUS at 06:06

## 2024-06-12 RX ADMIN — Medication 20 MG: at 06:06

## 2024-06-12 RX ADMIN — OXYCODONE 5 MG: 5 TABLET ORAL at 07:06

## 2024-06-12 NOTE — SUBJECTIVE & OBJECTIVE
Medications Prior to Admission   Medication Sig Dispense Refill Last Dose    lisinopriL (PRINIVIL,ZESTRIL) 5 MG tablet Take 1 tablet (5 mg total) by mouth once daily. 90 tablet 1 6/11/2024 at 2100    ascorbic acid, vitamin C, (VITAMIN C) 500 MG tablet Take 1 tablet (500 mg total) by mouth 2 (two) times daily. 60 tablet 0 5/29/2024    aspirin (ECOTRIN) 81 MG EC tablet Take 1 tablet (81 mg total) by mouth once daily. 30 tablet 0 5/29/2024    multivitamin capsule Take 1 capsule by mouth once daily.   5/29/2024       Review of patient's allergies indicates:   Allergen Reactions    No known allergies        Past Medical History:   Diagnosis Date    Back pain     Degenerative disc disease     Disorder of kidney and ureter     stone    Essential hypertension 04/03/2018    Hepatitis     Hyperlipidemia     Hypoxia 06/02/2021    Obesity     Pneumonia     Pneumonia due to COVID-19 virus 06/03/2021    Trouble in sleeping     due to pain     Past Surgical History:   Procedure Laterality Date    SHOULDER SURGERY  4/25/12    rt shoulder     Family History       Problem Relation (Age of Onset)    Cancer Mother    Diabetes Daughter    Heart disease Father    Hyperlipidemia Sister, Sister          Tobacco Use    Smoking status: Never    Smokeless tobacco: Never   Substance and Sexual Activity    Alcohol use: No    Drug use: Never    Sexual activity: Not on file     Review of Systems   Constitutional:  Negative for activity change, chills, diaphoresis, fatigue and fever.   HENT:  Negative for tinnitus, trouble swallowing and voice change.    Eyes:  Negative for photophobia and visual disturbance.   Respiratory:  Negative for cough, choking, chest tightness, shortness of breath, wheezing and stridor.    Cardiovascular:  Negative for chest pain, palpitations and leg swelling.   Gastrointestinal:  Negative for abdominal distention, abdominal pain, blood in stool, constipation, diarrhea, nausea and vomiting.   Endocrine: Negative for  "polydipsia, polyphagia and polyuria.   Genitourinary:  Negative for dysuria, flank pain, frequency, hematuria and urgency.   Musculoskeletal:  Positive for back pain and gait problem. Negative for neck pain and neck stiffness.   Skin:  Negative for color change, pallor, rash and wound.   Neurological:  Negative for dizziness, tremors, seizures, syncope, facial asymmetry, speech difficulty, weakness, light-headedness, numbness and headaches.   Hematological:  Negative for adenopathy. Does not bruise/bleed easily.   Psychiatric/Behavioral:  Negative for agitation, behavioral problems and confusion.      Objective:        There is no height or weight on file to calculate BMI.  Vital Signs (Most Recent):  Temp: 98.1 °F (36.7 °C) (06/12/24 1420)  Pulse: 61 (06/12/24 1420)  Resp: 18 (06/12/24 1420)  BP: 130/64 (06/12/24 1420)  SpO2: 99 % (06/12/24 1420) Vital Signs (24h Range):  Temp:  [98.1 °F (36.7 °C)] 98.1 °F (36.7 °C)  Pulse:  [61] 61  Resp:  [18] 18  SpO2:  [99 %] 99 %  BP: (130)/(64) 130/64        Physical Exam   Neurosurgery Physical Exam  General: AOx3, GCS E4V5M6   CNII-XII: Intact on exam, PERRL, visual fields grossly intact, EOMi, facial sensation preserved, no facial assymetry, tongue/uvula/palate midline, shoulder shrug equal, no pronator drift   Extremities: 5/5 motor throughout, sensorium intact throughout, coordination intact throughout, DTRs 2+, no pathological reflexes, no sensory level present     General: Awake, Alert, Oriented   Head: Non-traumatic, normocephalic   Eyes: Pupils equal, EOMi   Neck: Supple, normal ROM, no tenderness to palpation   CVS: Normal rate and rhythm, distal pulses present   Pulm: Symmetric expansion, no respiratory distress   GI: Abdomen nondistended, nontender   MSK: Moves all extremities without restriction, atraumatic   Skin: Dry, intact   Psych: Normal thought content and cognition     Significant Labs:  No results for input(s): "GLU", "NA", "K", "CL", "CO2", "BUN", " ""CREATININE", "CALCIUM", "MG" in the last 48 hours.  No results for input(s): "WBC", "HGB", "HCT", "PLT" in the last 48 hours.  No results for input(s): "LABPT", "INR", "APTT" in the last 48 hours.  Microbiology Results (last 7 days)       ** No results found for the last 168 hours. **          ABGs: No results for input(s): "PH", "PCO2", "PO2", "HCO3", "POCSATURATED", "BE" in the last 48 hours.  Cardiac markers: No results for input(s): "CKMB", "CPKMB", "TROPONINT", "TROPONINI", "MYOGLOBIN" in the last 48 hours.  CMP: No results for input(s): "GLU", "CALCIUM", "ALBUMIN", "PROT", "NA", "K", "CO2", "CL", "BUN", "CREATININE", "ALKPHOS", "ALT", "AST", "BILITOT" in the last 48 hours.  CRP: No results for input(s): "CRP" in the last 48 hours.  ESR: No results for input(s): "POCESR", "ERYTHROCYTES" in the last 48 hours.  LFTs: No results for input(s): "ALT", "AST", "ALKPHOS", "BILITOT", "PROT", "ALBUMIN" in the last 48 hours.  Procalcitonin: No results for input(s): "PROCAL" in the last 48 hours.    Significant Diagnostics:  I have reviewed all pertinent imaging results/findings within the past 24 hours.  "

## 2024-06-12 NOTE — BRIEF OP NOTE
[facility] - Surgery ([facility])  Brief Operative Note    SUMMARY     Surgery Date: 6/12/2024     Surgeons and Role:     * Umberto Leslie MD - Primary    Assisting Surgeon: Patric Anderson MD    Pre-op Diagnosis:  Cervical spondylosis with myelopathy [M47.12]    Post-op Diagnosis:  Post-Op Diagnosis Codes:     * Cervical spondylosis with myelopathy [M47.12]    Procedure(s) (LRB):  C5-6,C6-7 ANTERIOR CERVICAL DISCECTOMY AND FUSION (N/A)    Anesthesia: General    Implants:  Implant Name Type Inv. Item Serial No.  Lot No. LRB No. Used Action   PUTTY OSTEOSELECT SYR 2.5CC - [id number]  PUTTY OSTEOSELECT SYR 2.5CC [id number] [employer]  N/A 1 Implanted   SPACER COALITION MIS [id number] - [id number]  SPACER COALITION MIS [id number]  GLOBUS  N/A 2 Implanted   SCREW BONE VARIABLE SD 3.6X16 - [id number]  SCREW BONE VARIABLE SD 3.6X16  GLOBUS  N/A 4 Implanted       Operative Findings: C5-7 ACDF    Estimated Blood Loss: 50cc         Specimens:   Specimen (24h ago, onward)      None            [id number]

## 2024-06-12 NOTE — ANESTHESIA PREPROCEDURE EVALUATION
06/12/2024  [facility]  Anesthesia Pre-Operative Evaluation         Patient Name: Leonardo Pisano  YOB: 1955  MRN: [id number]    SUBJECTIVE:     Pre-operative evaluation for Procedure(s) (LRB):  C5-6,C6-7 ANTERIOR CERVICAL DISCECTOMY AND FUSION (N/A)     06/12/2024    Leonardo Pisano is a 69 y.o. male w/ a pertinent PMHx of      Full medical history listed below      LDA: None documented.    Prev airway: None documented.     GTTs: None documented.        Patient Active Problem List   Diagnosis    Shoulder pain    Essential hypertension    Mixed hyperlipidemia    Troponin I above reference range    Abnormal liver enzymes    Spinal stenosis of cervical region    Spinal stenosis of lumbar region without neurogenic claudication    Obesity (BMI 30-39.9)    Senile purpura       Review of patient's allergies indicates:   Allergen Reactions    No known allergies        Current Inpatient Medications:   mupirocin  1 g Nasal BID       No current facility-administered medications on file prior to encounter.     Current Outpatient Medications on File Prior to Encounter   Medication Sig Dispense Refill    ascorbic acid, vitamin C, (VITAMIN C) 500 MG tablet Take 1 tablet (500 mg total) by mouth 2 (two) times daily. 60 tablet 0    aspirin (ECOTRIN) 81 MG EC tablet Take 1 tablet (81 mg total) by mouth once daily. 30 tablet 0    multivitamin capsule Take 1 capsule by mouth once daily.         Past Surgical History:   Procedure Laterality Date    SHOULDER SURGERY  4/25/12    rt shoulder       Social History     Socioeconomic History    Marital status:     Number of children: 2   Occupational History     Employer: [employer]   Tobacco Use    Smoking status: Never    Smokeless tobacco: Never   Substance and Sexual Activity    Alcohol use: No    Drug use: Never     Social Determinants of  "Health     Financial Resource Strain: Low Risk  (12/14/2023)    Overall Financial Resource Strain (CARDIA)     Difficulty of Paying Living Expenses: Not hard at all   Food Insecurity: No Food Insecurity (12/14/2023)    Hunger Vital Sign     Worried About Running Out of Food in the Last Year: Never true     Ran Out of Food in the Last Year: Never true   Transportation Needs: No Transportation Needs (12/14/2023)    PRAPARE - Transportation     Lack of Transportation (Medical): No     Lack of Transportation (Non-Medical): No   Physical Activity: Sufficiently Active (12/14/2023)    Exercise Vital Sign     Days of Exercise per Week: 5 days     Minutes of Exercise per Session: 30 min   Stress: No Stress Concern Present (12/14/2023)    [employer] - Occupational Stress Questionnaire     Feeling of Stress : Only a little   Housing Stability: Unknown (12/14/2023)    Housing Stability Vital Sign     Unable to Pay for Housing in the Last Year: No     Unstable Housing in the Last Year: No       OBJECTIVE:     Vital Signs Range (Last 24H):  Temp:  [36.7 °C (98.1 °F)]   Pulse:  [61]   Resp:  [18]   BP: (130)/(64)   SpO2:  [99 %]       CBC:   No results for input(s): "WBC", "RBC", "HGB", "HCT", "PLT", "MCV", "MCH", "MCHC" in the last 72 hours.    CMP: No results for input(s): "NA", "K", "CL", "CO2", "BUN", "CREATININE", "GLU", "MG", "PHOS", "CALCIUM", "ALBUMIN", "PROT", "ALKPHOS", "ALT", "AST", "BILITOT" in the last 72 hours.    INR:  No results for input(s): "PT", "INR", "PROTIME", "APTT" in the last 72 hours.    Diagnostic Studies: No relevant studies.    EKG:   Results for orders placed or performed during the hospital encounter of 06/06/24   EKG 12-lead    Collection Time: 06/06/24  1:07 PM   Result Value Ref Range    QRS Duration 80 ms    [facility] QTC Calculation 378 ms    Narrative    Test Reason : Z01.818,    Vent. Rate : 059 BPM     Atrial Rate : 059 BPM     P-R Int : 154 ms          QRS Dur : 080 " ms      QT Int : 382 ms       P-R-T Axes : 031 008 024 degrees     QTc Int : 378 ms    Sinus bradycardia  Otherwise normal ECG  When compared with ECG of 03-JUN-2021 07:53,  No significant change was found  Confirmed by AVERY FOY MD ([id number]) on 6/6/2024 1:45:17 PM    Referred By: RHONDA LEOPOLD           Confirmed By:AVERY FOY MD        2D ECHO:   No results found for this or any previous visit.         ASSESSMENT/PLAN:           Pre-op Assessment    I have reviewed the Patient Summary Reports.     I have reviewed the Nursing Notes. I have reviewed the NPO Status.   I have reviewed the Medications.     Review of Systems  Anesthesia Hx:   History of prior surgery of interest to airway management or planning:          Denies Family Hx of Anesthesia complications.    Denies Personal Hx of Anesthesia complications.                        Physical Exam  General: Well nourished    Airway:  Mallampati: III / II  Mouth Opening: Normal  TM Distance: Normal  Tongue: Normal  Neck ROM: Normal ROM    Chest/Lungs:  Normal Respiratory Rate    Heart:  Rate: Normal        Anesthesia Plan  Type of Anesthesia, risks & benefits discussed:    Anesthesia Type: Gen ETT  Intra-op Monitoring Plan: Standard ASA Monitors  Post Op Pain Control Plan: multimodal analgesia  Induction:  IV  Airway Plan: Video  Informed Consent: Informed consent signed with the Patient and all parties understand the risks and agree with anesthesia plan.  All questions answered.   ASA Score: 2  Day of Surgery Review of History & Physical: H&P Update referred to the surgeon/provider.  Anesthesia Plan Notes: NPO confirmed  Plan TIVA and 2nd PIV    Ready For Surgery From Anesthesia Perspective.     .

## 2024-06-12 NOTE — ASSESSMENT & PLAN NOTE
69M w/ PMH HTN, HLD and progressive low back pain with lumbar spondylosis who presents for C5-7 ACDF for severe cervical stenosis prior to his lumbar operation:    --Patient evaluated prior to procedure  --All diagnostics and imaging reviewed  --Patient NPO since MN  --No anti-coag/plt medication in the last 72h  --Risks & benefits of surgery explained in detail; patient consented and all questions answered  --Further reccs to follow procedure    Dispo: Ongoing

## 2024-06-12 NOTE — ANESTHESIA PROCEDURE NOTES
Intubation    Date/Time: 6/12/2024 4:57 PM    Performed by: Kallie Craven CRNA  Authorized by: Carter Guan MD    Intubation:     Induction:  Intravenous    Intubated:  Postinduction    Mask Ventilation:  Easy mask    Attempts:  1    Attempted By:  CRNA    Method of Intubation:  Video laryngoscopy    Blade:  Cabrera 4    Laryngeal View Grade: Grade I - full view of cords      Difficult Airway Encountered?: No      Complications:  None    Airway Device:  Oral endotracheal tube    Airway Device Size:  7.5    Style/Cuff Inflation:  Cuffed (inflated to minimal occlusive pressure)    Tube secured:  24    Secured at:  The lips    Placement Verified By:  Capnometry    Complicating Factors:  None    Findings Post-Intubation:  BS equal bilateral and atraumatic/condition of teeth unchanged

## 2024-06-12 NOTE — H&P
[facility] - Surgery ([facility])  Neuorsurgery  History and Physical     Patient Name: Leonardo Pisano  MRN: [id number]  Admission Date: 6/12/2024  Attending Physician: Umberto Leslie MD   Primary Care Physician: Saud Coles MD    Patient information was obtained from patient and past medical records.     Subjective:     Chief Complaint/Reason for Admission: C5-7 ACDF     HPI:  69M w/ PMH HTN, HLD and progressive low back pain with lumbar spondylosis who presents for C5-7 ACDF for severe cervical stenosis prior to his lumbar operation. States that he was followed by Dr. rPesley and a MRI of the entire spine was obtained in 2022 which showed multilevel degenerative changes of the neck and low back and a pars defect at L5-S1. It was recommended that the patient proceed with cervical surgery prior to back surgery. He reports he did PT and injections in the past for his lower back which was helpful. He denies any pain in the extremities, weakness in the extremities, numbness/tingling in the extremities, clumsiness, gait dysfunction,  weakness. States that his back pain improved so he decided not to proceed with surgery. Until recently, he has noticed increased leg weakness over the past 6 months. He is taking Aleve with mild relief. Pain is worse with prolonged standing and bending. Pain improves with sitting and elevating his legs.     Medications Prior to Admission   Medication Sig Dispense Refill Last Dose    lisinopriL (PRINIVIL,ZESTRIL) 5 MG tablet Take 1 tablet (5 mg total) by mouth once daily. 90 tablet 1 6/11/2024 at 2100    ascorbic acid, vitamin C, (VITAMIN C) 500 MG tablet Take 1 tablet (500 mg total) by mouth 2 (two) times daily. 60 tablet 0 5/29/2024    aspirin (ECOTRIN) 81 MG EC tablet Take 1 tablet (81 mg total) by mouth once daily. 30 tablet 0 5/29/2024    multivitamin capsule Take 1 capsule by mouth once daily.   5/29/2024       Review of patient's allergies indicates:   Allergen Reactions    No  known allergies        Past Medical History:   Diagnosis Date    Back pain     Degenerative disc disease     Disorder of kidney and ureter     stone    Essential hypertension 04/03/2018    Hepatitis     Hyperlipidemia     Hypoxia 06/02/2021    Obesity     Pneumonia     Pneumonia due to COVID-19 virus 06/03/2021    Trouble in sleeping     due to pain     Past Surgical History:   Procedure Laterality Date    SHOULDER SURGERY  4/25/12    rt shoulder     Family History       Problem Relation (Age of Onset)    Cancer Mother    Diabetes Daughter    Heart disease Father    Hyperlipidemia Sister, Sister          Tobacco Use    Smoking status: Never    Smokeless tobacco: Never   Substance and Sexual Activity    Alcohol use: No    Drug use: Never    Sexual activity: Not on file     Review of Systems   Constitutional:  Negative for activity change, chills, diaphoresis, fatigue and fever.   HENT:  Negative for tinnitus, trouble swallowing and voice change.    Eyes:  Negative for photophobia and visual disturbance.   Respiratory:  Negative for cough, choking, chest tightness, shortness of breath, wheezing and stridor.    Cardiovascular:  Negative for chest pain, palpitations and leg swelling.   Gastrointestinal:  Negative for abdominal distention, abdominal pain, blood in stool, constipation, diarrhea, nausea and vomiting.   Endocrine: Negative for polydipsia, polyphagia and polyuria.   Genitourinary:  Negative for dysuria, flank pain, frequency, hematuria and urgency.   Musculoskeletal:  Positive for back pain and gait problem. Negative for neck pain and neck stiffness.   Skin:  Negative for color change, pallor, rash and wound.   Neurological:  Negative for dizziness, tremors, seizures, syncope, facial asymmetry, speech difficulty, weakness, light-headedness, numbness and headaches.   Hematological:  Negative for adenopathy. Does not bruise/bleed easily.   Psychiatric/Behavioral:  Negative for agitation, behavioral problems  "and confusion.      Objective:        There is no height or weight on file to calculate BMI.  Vital Signs (Most Recent):  Temp: 98.1 °F (36.7 °C) (06/12/24 1420)  Pulse: 61 (06/12/24 1420)  Resp: 18 (06/12/24 1420)  BP: 130/64 (06/12/24 1420)  SpO2: 99 % (06/12/24 1420) Vital Signs (24h Range):  Temp:  [98.1 °F (36.7 °C)] 98.1 °F (36.7 °C)  Pulse:  [61] 61  Resp:  [18] 18  SpO2:  [99 %] 99 %  BP: (130)/(64) 130/64        Physical Exam   Neurosurgery Physical Exam  General: AOx3, GCS E4V5M6   CNII-XII: Intact on exam, PERRL, visual fields grossly intact, EOMi, facial sensation preserved, no facial assymetry, tongue/uvula/palate midline, shoulder shrug equal, no pronator drift   Extremities: 5/5 motor throughout, sensorium intact throughout, coordination intact throughout, DTRs 2+, no pathological reflexes, no sensory level present     General: Awake, Alert, Oriented   Head: Non-traumatic, normocephalic   Eyes: Pupils equal, EOMi   Neck: Supple, normal ROM, no tenderness to palpation   CVS: Normal rate and rhythm, distal pulses present   Pulm: Symmetric expansion, no respiratory distress   GI: Abdomen nondistended, nontender   MSK: Moves all extremities without restriction, atraumatic   Skin: Dry, intact   Psych: Normal thought content and cognition     Significant Labs:  No results for input(s): "GLU", "NA", "K", "CL", "CO2", "BUN", "CREATININE", "CALCIUM", "MG" in the last 48 hours.  No results for input(s): "WBC", "HGB", "HCT", "PLT" in the last 48 hours.  No results for input(s): "LABPT", "INR", "APTT" in the last 48 hours.  Microbiology Results (last 7 days)       ** No results found for the last 168 hours. **          ABGs: No results for input(s): "PH", "PCO2", "PO2", "HCO3", "POCSATURATED", "BE" in the last 48 hours.  Cardiac markers: No results for input(s): "CKMB", "CPKMB", "TROPONINT", "TROPONINI", "MYOGLOBIN" in the last 48 hours.  CMP: No results for input(s): "GLU", "CALCIUM", "ALBUMIN", "PROT", "NA", " ""K", "CO2", "CL", "BUN", "CREATININE", "ALKPHOS", "ALT", "AST", "BILITOT" in the last 48 hours.  CRP: No results for input(s): "CRP" in the last 48 hours.  ESR: No results for input(s): "POCESR", "ERYTHROCYTES" in the last 48 hours.  LFTs: No results for input(s): "ALT", "AST", "ALKPHOS", "BILITOT", "PROT", "ALBUMIN" in the last 48 hours.  Procalcitonin: No results for input(s): "PROCAL" in the last 48 hours.    Significant Diagnostics:  I have reviewed all pertinent imaging results/findings within the past 24 hours.  Assessment and Plan:     Cervical stenosis of spinal canal  69M w/ PMH HTN, HLD and progressive low back pain with lumbar spondylosis who presents for C5-7 ACDF for severe cervical stenosis prior to his lumbar operation:    --Patient evaluated prior to procedure  --All diagnostics and imaging reviewed  --Patient NPO since MN  --No anti-coag/plt medication in the last 72h  --Risks & benefits of surgery explained in detail; patient consented and all questions answered  --Further reccs to follow procedure    Dispo: Ongoing         Patric Anderson MD  Neurosurgery  [facility] - Surgery (2nd Fl)      "

## 2024-06-12 NOTE — HPI
69M w/ PMH HTN, HLD and progressive low back pain with lumbar spondylosis who presents for C5-7 ACDF for severe cervical stenosis prior to his lumbar operation. States that he was followed by Dr. Presley and a MRI of the entire spine was obtained in 2022 which showed multilevel degenerative changes of the neck and low back and a pars defect at L5-S1. It was recommended that the patient proceed with cervical surgery prior to back surgery. He reports he did PT and injections in the past for his lower back which was helpful. He denies any pain in the extremities, weakness in the extremities, numbness/tingling in the extremities, clumsiness, gait dysfunction,  weakness. States that his back pain improved so he decided not to proceed with surgery. Until recently, he has noticed increased leg weakness over the past 6 months. He is taking Aleve with mild relief. Pain is worse with prolonged standing and bending. Pain improves with sitting and elevating his legs.

## 2024-06-12 NOTE — ANESTHESIA PROCEDURE NOTES
Arterial    Diagnosis: HTN    Patient location during procedure: done in OR    Staffing  Authorizing Provider: Carter Guan MD  Performing Provider: Carter Guan MD    Staffing  Performed by: Carter Guan MD  Authorized by: Carter Guan MD    Anesthesiologist was present at the time of the procedure.    Preanesthetic Checklist  Completed: patient identified, IV checked, site marked, risks and benefits discussed, surgical consent, monitors and equipment checked, pre-op evaluation, timeout performed and anesthesia consent givenArterial  Skin Prep: chlorhexidine gluconate  Local Infiltration: none  Orientation: left  Location: radial    Catheter Size: 20 G  Catheter placement by Anatomical landmarks. Heme positive aspiration all ports. Insertion Attempts: 1  Assessment  Dressing: secured with tape and tegaderm  Patient: Tolerated well

## 2024-06-13 VITALS
HEIGHT: 74 IN | TEMPERATURE: 98 F | OXYGEN SATURATION: 96 % | DIASTOLIC BLOOD PRESSURE: 65 MMHG | RESPIRATION RATE: 18 BRPM | HEART RATE: 62 BPM | BODY MASS INDEX: 31.18 KG/M2 | WEIGHT: 242.94 LBS | SYSTOLIC BLOOD PRESSURE: 147 MMHG

## 2024-06-13 PROBLEM — E87.1 HYPONATREMIA: Status: ACTIVE | Noted: 2024-06-13

## 2024-06-13 PROBLEM — R11.0 NAUSEA: Status: ACTIVE | Noted: 2024-06-13

## 2024-06-13 PROBLEM — R52 PAIN: Status: ACTIVE | Noted: 2024-06-13

## 2024-06-13 LAB
ANION GAP SERPL CALC-SCNC: 10 MMOL/L (ref 8–16)
BASOPHILS # BLD AUTO: 0.01 K/UL (ref 0–0.2)
BASOPHILS NFR BLD: 0.1 % (ref 0–1.9)
BUN SERPL-MCNC: 18 MG/DL (ref 8–23)
CALCIUM SERPL-MCNC: 9.1 MG/DL (ref 8.7–10.5)
CHLORIDE SERPL-SCNC: 106 MMOL/L (ref 95–110)
CO2 SERPL-SCNC: 19 MMOL/L (ref 23–29)
CREAT SERPL-MCNC: 0.9 MG/DL (ref 0.5–1.4)
DIFFERENTIAL METHOD BLD: ABNORMAL
EOSINOPHIL # BLD AUTO: 0 K/UL (ref 0–0.5)
EOSINOPHIL NFR BLD: 0 % (ref 0–8)
ERYTHROCYTE [DISTWIDTH] IN BLOOD BY AUTOMATED COUNT: 12.3 % (ref 11.5–14.5)
EST. GFR  (NO RACE VARIABLE): >60 ML/MIN/1.73 M^2
GLUCOSE SERPL-MCNC: 216 MG/DL (ref 70–110)
HCT VFR BLD AUTO: 41.2 % (ref 40–54)
HGB BLD-MCNC: 14 G/DL (ref 14–18)
IMM GRANULOCYTES # BLD AUTO: 0.03 K/UL (ref 0–0.04)
IMM GRANULOCYTES NFR BLD AUTO: 0.3 % (ref 0–0.5)
LYMPHOCYTES # BLD AUTO: 0.6 K/UL (ref 1–4.8)
LYMPHOCYTES NFR BLD: 6.2 % (ref 18–48)
MCH RBC QN AUTO: 31.3 PG (ref 27–31)
MCHC RBC AUTO-ENTMCNC: 34 G/DL (ref 32–36)
MCV RBC AUTO: 92 FL (ref 82–98)
MONOCYTES # BLD AUTO: 0.2 K/UL (ref 0.3–1)
MONOCYTES NFR BLD: 1.5 % (ref 4–15)
NEUTROPHILS # BLD AUTO: 9.2 K/UL (ref 1.8–7.7)
NEUTROPHILS NFR BLD: 91.9 % (ref 38–73)
NRBC BLD-RTO: 0 /100 WBC
PLATELET # BLD AUTO: 199 K/UL (ref 150–450)
PMV BLD AUTO: 9.4 FL (ref 9.2–12.9)
POTASSIUM SERPL-SCNC: 4.5 MMOL/L (ref 3.5–5.1)
RBC # BLD AUTO: 4.47 M/UL (ref 4.6–6.2)
SODIUM SERPL-SCNC: 135 MMOL/L (ref 136–145)
WBC # BLD AUTO: 10.02 K/UL (ref 3.9–12.7)

## 2024-06-13 PROCEDURE — 97161 PT EVAL LOW COMPLEX 20 MIN: CPT

## 2024-06-13 PROCEDURE — 63600175 PHARM REV CODE 636 W HCPCS: Performed by: STUDENT IN AN ORGANIZED HEALTH CARE EDUCATION/TRAINING PROGRAM

## 2024-06-13 PROCEDURE — 97530 THERAPEUTIC ACTIVITIES: CPT

## 2024-06-13 PROCEDURE — 85025 COMPLETE CBC W/AUTO DIFF WBC: CPT | Performed by: STUDENT IN AN ORGANIZED HEALTH CARE EDUCATION/TRAINING PROGRAM

## 2024-06-13 PROCEDURE — 36415 COLL VENOUS BLD VENIPUNCTURE: CPT | Performed by: STUDENT IN AN ORGANIZED HEALTH CARE EDUCATION/TRAINING PROGRAM

## 2024-06-13 PROCEDURE — 97165 OT EVAL LOW COMPLEX 30 MIN: CPT

## 2024-06-13 PROCEDURE — 97535 SELF CARE MNGMENT TRAINING: CPT

## 2024-06-13 PROCEDURE — 25000003 PHARM REV CODE 250: Performed by: STUDENT IN AN ORGANIZED HEALTH CARE EDUCATION/TRAINING PROGRAM

## 2024-06-13 PROCEDURE — 97116 GAIT TRAINING THERAPY: CPT

## 2024-06-13 PROCEDURE — 80048 BASIC METABOLIC PNL TOTAL CA: CPT | Performed by: STUDENT IN AN ORGANIZED HEALTH CARE EDUCATION/TRAINING PROGRAM

## 2024-06-13 RX ORDER — OXYCODONE HYDROCHLORIDE 5 MG/1
5 TABLET ORAL EVERY 4 HOURS PRN
Qty: 42 TABLET | Refills: 0 | Status: SHIPPED | OUTPATIENT
Start: 2024-06-13

## 2024-06-13 RX ORDER — IBUPROFEN 200 MG
16 TABLET ORAL
Status: CANCELLED | OUTPATIENT
Start: 2024-06-13

## 2024-06-13 RX ORDER — ONDANSETRON 8 MG/1
8 TABLET, ORALLY DISINTEGRATING ORAL EVERY 6 HOURS PRN
Qty: 20 TABLET | Refills: 0 | Status: SHIPPED | OUTPATIENT
Start: 2024-06-13

## 2024-06-13 RX ORDER — INSULIN ASPART 100 [IU]/ML
0-5 INJECTION, SOLUTION INTRAVENOUS; SUBCUTANEOUS
Status: CANCELLED | OUTPATIENT
Start: 2024-06-13

## 2024-06-13 RX ORDER — IBUPROFEN 200 MG
24 TABLET ORAL
Status: CANCELLED | OUTPATIENT
Start: 2024-06-13

## 2024-06-13 RX ORDER — GLUCAGON 1 MG
1 KIT INJECTION
Status: CANCELLED | OUTPATIENT
Start: 2024-06-13

## 2024-06-13 RX ADMIN — CEFAZOLIN 2 G: 2 INJECTION, POWDER, FOR SOLUTION INTRAMUSCULAR; INTRAVENOUS at 03:06

## 2024-06-13 RX ADMIN — OXYCODONE HYDROCHLORIDE 10 MG: 10 TABLET ORAL at 03:06

## 2024-06-13 RX ADMIN — ACETAMINOPHEN 1000 MG: 325 TABLET ORAL at 02:06

## 2024-06-13 RX ADMIN — SODIUM CHLORIDE: 9 INJECTION, SOLUTION INTRAVENOUS at 12:06

## 2024-06-13 RX ADMIN — ONDANSETRON 8 MG: 8 TABLET, ORALLY DISINTEGRATING ORAL at 03:06

## 2024-06-13 RX ADMIN — LISINOPRIL 5 MG: 5 TABLET ORAL at 09:06

## 2024-06-13 RX ADMIN — ACETAMINOPHEN 1000 MG: 325 TABLET ORAL at 06:06

## 2024-06-13 RX ADMIN — MUPIROCIN: 20 OINTMENT TOPICAL at 09:06

## 2024-06-13 RX ADMIN — ACETAMINOPHEN 1000 MG: 325 TABLET ORAL at 12:06

## 2024-06-13 RX ADMIN — THERA TABS 1 TABLET: TAB at 09:06

## 2024-06-13 RX ADMIN — OXYCODONE HYDROCHLORIDE 10 MG: 10 TABLET ORAL at 09:06

## 2024-06-13 RX ADMIN — CEFAZOLIN 2 G: 2 INJECTION, POWDER, FOR SOLUTION INTRAMUSCULAR; INTRAVENOUS at 09:06

## 2024-06-13 NOTE — HOSPITAL COURSE
The patient presented for the above stated procedure. The patient tolerated the procedure well and there were no intra-operative complications. After surgery, the patient was extubated and taken to recovery in stable condition.  After observation in recovery, the patient was transferred to the neurosurgical floor.  Reports pain is controlled, denies weakness/numbness. Voiding. Tolerating PO w/o dysphagia. Ambulating well in halls. Post-op X-rays showed good placement of the hardware. PT/OT were consulted, the patient progressed, and was cleared to go home without DME needs.  Drain was removed on 6/13 without complication.  On 6/13 he was discharged home with pain medication and follow up appointments. The patient was tolerating a regular diet and voiding without difficulty. Activity as tolerated. At the time of discharge, the patient was neurologically stable, was afebrile, and vital signs were stable. Discharge instructions were given verbally/written to the patient and their family, including wound care and follow-up appointments, and all of their questions were answered. The patient was also given a discharge instruction sheet explaining the aforementioned discussion.  The patient verbalized understanding of instructions and agreed to the plan. They were encouraged to call the clinic with any questions they might have prior to the follow up appointments.     General: well developed, well nourished, no distress.   Head: normocephalic, atraumatic  Neurologic: Alert and oriented. Thought content appropriate.  GCS: Motor: 6/Verbal: 5/Eyes: 4 GCS Total: 15  Mental Status: Awake, Alert, Oriented x 4  Language: No aphasia  Speech: No dysarthria  Cranial nerves: face symmetric, tongue midline, CN II-XII grossly intact.   Eyes: pupils equal, round, reactive to light with accomodation, EOMI.   Pulmonary: normal respirations, no signs of respiratory distress  Sensory: intact to light touch throughout  Motor Strength: Moves  all extremities spontaneously with good tone.  Full strength upper and lower extremities. No abnormal movements seen.     Strength  Deltoids Triceps Biceps Wrist Extension Wrist Flexion Hand    Upper: R 5/5 5/5 5/5 5/5 5/5 5/5    L 5/5 5/5 5/5 5/5 5/5 5/5     Iliopsoas Quadriceps Knee  Flexion Tibialis  anterior Gastro- cnemius EHL   Lower: R 5/5 5/5 5/5 5/5 5/5 5/5    L 5/5 5/5 5/5 5/5 5/5 5/5     Skin: Skin is warm, dry and intact.  Incision c/d/I with skin edges well approximated with dermabond. No surrounding erythema or edema. No drainage from incision. No palpable hematoma.

## 2024-06-13 NOTE — PT/OT/SLP EVAL
Occupational Therapy   Evaluation/Treatment and Discharge    Name: Leonardo Pisano  MRN: [id number]  Admitting Diagnosis: Cervical stenosis of spinal canal  Recent Surgery: Procedure(s) (LRB):  C5-6,C6-7 ANTERIOR CERVICAL DISCECTOMY AND FUSION (N/A) 1 Day Post-Op    Recommendations:     Discharge Recommendations: No Therapy Indicated  Discharge Equipment Recommendations:  none  Barriers to discharge:  None    Assessment:     Leonardo Psiano is a 69 y.o. male with a medical diagnosis of Cervical stenosis of spinal canal.  He presents with the following performance deficits affecting function: orthopedic precautions.  Pt toelrated the session well, education provided on performing ADLs within precautions with cervical collar as needed for comfort. Pt participated in all activities well.     Rehab Prognosis: Good; patient would benefit from acute skilled OT services to address these deficits and reach maximum level of function.       Plan:     Patient has met plan of care     Subjective     Chief Complaint: None   Patient/Family Comments/goals: to return to OF    Occupational Profile:  Living Environment: Patient lives with their spouse in a single story house with number of outside stair(s): 3 with R HR and tub-shower combo. Pt discharging to home in [place] with 15 JUWAN and B HR with WIS.  Prior Level of Function: Prior to admission, patient was independent.  Equipment Used at Home: none.  DME owned (not currently used): none  Assistance Upon Discharge: family    Pain/Comfort:  Pain Rating 1: 2/10  Location - Side 1: Bilateral  Location - Orientation 1: generalized  Location 1: neck  Pain Addressed 1: Reposition, Distraction  Pain Rating Post-Intervention 1: other (see comments) (not rated)    Patients cultural, spiritual, Roman Catholic conflicts given the current situation: no    Objective:     Communicated with: RN prior to session.  Patient found HOB elevated with peripheral IV upon OT entry to room.    General  Precautions: Standard, fall  Orthopedic Precautions: spinal precautions  Braces: N/A  Respiratory Status: Room air    Occupational Performance:    Bed Mobility:    Patient completed Scooting/Bridging with supervision  Patient completed Supine to Sit with supervision    Functional Mobility/Transfers:  Patient completed Sit <> Stand Transfer with stand by assistance  with  no assistive device   Functional Mobility: Pt engaging in functional mobility to simulate household/community distances approx 20 ft with SBA and utilizing no AD in order to maximize functional activity tolerance and standing balance required for engagement in occupations of choice.    Activities of Daily Living:  Grooming: supervision : to perform oral care in standing at the sink   Upper Body Dressing: minimum assistance : to don and adjust c-collar EOB   Lower Body Dressing: stand by assistance : To don socks sitting EOB via figure-4   Toileting: supervision : To void in standing at the toilet     Cognitive/Visual Perceptual:  Cognitive/Psychosocial Skills:     -       Oriented to: Person, Place, Time, and Situation   -       Follows Commands/attention:Follows multistep  commands  -       Safety awareness/insight to disability: intact   -       Mood/Affect/Coping skills/emotional control: Appropriate to situation  Visual/Perceptual:      -Intact visual field     Physical Exam:  Balance:  Static Sitting   supervision   Dynamic Sitting   supervision   Static Standing   stand by assistance   Dynamic Standing   stand by assistance     Upper Extremity Function:     Left UE Right UE   UE Edema None noted None noted   UE ROM WFL WFL   UE Strength WFL WFL    Strength WFL WFL   Sensation    -       Intact    -       Intact   Fine Motor Skills:     -       Intact    -       Intact   Gross Motor Skills:   WFL   WFL         AMPAC 6 Click ADL:  AMPAC Total Score: 24    Treatment & Education:  Therapist provided facilitation and instruction of proper body  mechanics and fall prevention strategies during tasks listed above.  Instructed patient to sit in bedside chair daily to increase OOB/activity tolerance.  Instructed patient to use call light to have nursing staff assist with needs/transfers.  Discussed OT POC and answered all questions within OT scope of practice.  Whiteboard updated     Patient left up in chair with all lines intact, call button in reach, and wife present    GOALS:   Multidisciplinary Problems       Occupational Therapy Goals          Problem: Occupational Therapy    Goal Priority Disciplines Outcome Interventions   Occupational Therapy Goal     OT, PT/OT Progressing    Description: Goals to be met by: 6/27/24    Patient will increase functional independence with ADLs by performing:    UE Dressing with [place].  LE Dressing with [place]ry.  Grooming while standing at sink with Lowry.  Toileting from toilet with Lowry for hygiene and clothing management.   Toilet transfer to toilet with Lowry.                         History:     Past Medical History:   Diagnosis Date    Back pain     Degenerative disc disease     Disorder of kidney and ureter     stone    Essential hypertension 04/03/2018    Hepatitis     Hyperlipidemia     Hypoxia 06/02/2021    Obesity     Pneumonia     Pneumonia due to COVID-19 virus 06/03/2021    Trouble in sleeping     due to pain         Past Surgical History:   Procedure Laterality Date    FUSION, SPINE, CERVICAL, ANTERIOR APPROACH N/A 6/12/2024    Procedure: C5-6,C6-7 ANTERIOR CERVICAL DISCECTOMY AND FUSION;  Surgeon: Umberto Leslie MD;  Location: [facility] OR [address] [place] [address];  Service: Neurosurgery;  Laterality: N/A;    SHOULDER SURGERY  4/25/12    rt shoulder       Time Tracking:     OT Date of Treatment: 06/13/24  OT Start Time: 0903  OT Stop Time: 0934  OT Total Time (min): 31 min    Billable Minutes:Evaluation 8  Self Care/Home Management 13  Therapeutic Activity 10    6/13/2024

## 2024-06-13 NOTE — PLAN OF CARE
Problem: Adult Inpatient Plan of Care  Goal: Plan of Care Review  6/13/2024 0424 by Laura Garcia RN  Outcome: Progressing  6/13/2024 0416 by Laura Garcia RN  Outcome: Progressing  Flowsheets (Taken 6/12/2024 2344)  Plan of Care Reviewed With:   patient   spouse  Goal: Patient-Specific Goal (Individualized)  6/13/2024 0424 by Laura Garcia RN  Outcome: Progressing  6/13/2024 0416 by Laura Garcia RN  Outcome: Progressing  Flowsheets (Taken 6/13/2024 0416)  Individualized Care Needs: control pain  Anxieties, Fears or Concerns: Getting better and going home  Patient/Family-Specific Goals (Include Timeframe): Patient will call for pain meds as needed after review of POC  Goal: Absence of Hospital-Acquired Illness or Injury  6/13/2024 0424 by Laura Garcia RN  Outcome: Progressing  6/13/2024 0416 by Laura Garcia RN  Outcome: Progressing  Intervention: Identify and Manage Fall Risk  Flowsheets (Taken 6/13/2024 0416)  Safety Promotion/Fall Prevention:   bed alarm set   commode/urinal/bedpan at bedside   Fall Risk reviewed with patient/family   diversional activities provided   Fall Risk signage in place   family to remain at bedside   high risk medications identified   lighting adjusted   medications reviewed   muscle strengthening facilitated   nonskid shoes/socks when out of bed   side rails raised x 3   Supervised toileting - stay within arms reach   toileting scheduled   supervised activity   instructed to call staff for mobility  Intervention: Prevent Skin Injury  Flowsheets (Taken 6/13/2024 0416)  Body Position:   turned   30 degrees   upper extremity elevated   heels elevated   position changed independently   lower extremity elevated  Skin Protection:   skin sealant/moisture barrier applied   pectin skin barriers applied   incontinence pads utilized   transparent dressing maintained   pouching devices used   protective footwear used  Device Skin Pressure Protection:   absorbent  pad utilized/changed   skin-to-device areas padded   skin-to-skin areas padded   adhesive use limited   tubing/devices free from skin contact   positioning supports utilized   pressure points protected  Intervention: Prevent and Manage VTE (Venous Thromboembolism) Risk  Flowsheets (Taken 6/13/2024 0416)  VTE Prevention/Management:   remove, assess skin, and reapply sequential compression device   bleeding precations maintained   bleeding risk assessed   bleeding risk factor(s) identified, provider notified   remove, assess skin, and reapply compression stockings   dorsiflexion/plantar flexion performed   ROM (active) performed   ROM (passive) performed  Intervention: Prevent Infection  Flowsheets (Taken 6/13/2024 0416)  Infection Prevention:   rest/sleep promoted   environmental surveillance performed   equipment surfaces disinfected   hand hygiene promoted   cohorting utilized  Goal: Optimal Comfort and Wellbeing  6/13/2024 0424 by Laura Garcia RN  Outcome: Progressing  6/13/2024 0416 by Laura Garcia RN  Outcome: Progressing  Intervention: Monitor Pain and Promote Comfort  Flowsheets (Taken 6/13/2024 0416)  Pain Management Interventions:   around-the-clock dosing utilized   care clustered   diversional activity provided   relaxation techniques promoted   quiet environment facilitated   prescribed exercises encouraged   pillow support provided   pain management plan reviewed with patient/caregiver   position adjusted   premedicated for activity  Intervention: Provide Person-Centered Care  Flowsheets (Taken 6/13/2024 0416)  Trust Relationship/Rapport:   care explained   choices provided   emotional support provided   empathic listening provided   questions answered   questions encouraged   reassurance provided   thoughts/feelings acknowledged  Goal: Readiness for Transition of Care  6/13/2024 0424 by Laura Garcia RN  Outcome: Progressing  6/13/2024 0416 by Laura Garcia RN  Outcome:  Progressing     Problem: Wound  Goal: Optimal Coping  6/13/2024 0424 by Laura Garcia RN  Outcome: Progressing  6/13/2024 0416 by Laura Garcia RN  Outcome: Progressing  Intervention: Support Patient and Family Response  Flowsheets (Taken 6/13/2024 0416)  Supportive Measures:   active listening utilized   self-responsibility promoted   positive reinforcement provided   counseling provided   decision-making supported   goal-setting facilitated   guided imagery facilitated   journaling promoted   self-reflection promoted   self-care encouraged   relaxation techniques promoted   problem-solving facilitated   verbalization of feelings encouraged  Family/Support System Care:   caregiver stress acknowledged   involvement promoted   self-care encouraged   support provided   presence promoted  Goal: Optimal Functional Ability  6/13/2024 0424 by Laura Garcia RN  Outcome: Progressing  6/13/2024 0416 by Laura Garcia RN  Outcome: Progressing  Intervention: Optimize Functional Ability  Flowsheets (Taken 6/13/2024 0416)  Activity Management:   Ambulated -L4   Ambulated to bathroom - L4   Ankle pumps - L1   Arm raise - L1  Activity Assistance Provided:   assistance, 1 person   assistance, stand-by  Goal: Absence of Infection Signs and Symptoms  6/13/2024 0424 by Laura Garcia RN  Outcome: Progressing  6/13/2024 0416 by Laura Garcia RN  Outcome: Progressing  Intervention: Prevent or Manage Infection  Flowsheets (Taken 6/13/2024 0416)  Fever Reduction/Comfort Measures:   lightweight bedding   lightweight clothing   fluid intake increased  Infection Management: aseptic technique maintained  Isolation Precautions: precautions maintained  Goal: Improved Oral Intake  6/13/2024 0424 by Laura Garcia RN  Outcome: Progressing  6/13/2024 0416 by Laura Garcia RN  Outcome: Progressing  Intervention: Promote and Optimize Oral Intake  Flowsheets (Taken 6/13/2024 0416)  Nutrition Interventions:    frequent small meals provided   food preferences provided   diet advanced   meal set-up provided   supplemental foods provided   supplemental drinks provided  Goal: Optimal Pain Control and Function  6/13/2024 0424 by Laura Garcia RN  Outcome: Progressing  6/13/2024 0416 by Laura Garcia RN  Outcome: Progressing  Intervention: Prevent or Manage Pain  Flowsheets (Taken 6/13/2024 0416)  Sleep/Rest Enhancement:   awakenings minimized   consistent schedule promoted   noise level reduced   regular sleep/rest pattern promoted   family presence promoted   relaxation techniques promoted   room darkened  Pain Management Interventions:   around-the-clock dosing utilized   care clustered   diversional activity provided   relaxation techniques promoted   quiet environment facilitated   prescribed exercises encouraged   pillow support provided   pain management plan reviewed with patient/caregiver   position adjusted   premedicated for activity  Goal: Skin Health and Integrity  6/13/2024 0424 by Laura Garcia RN  Outcome: Progressing  6/13/2024 0416 by Laura Garcia RN  Outcome: Progressing  Goal: Optimal Wound Healing  6/13/2024 0424 by Laura Garcia RN  Outcome: Progressing  6/13/2024 0416 by Laura Garcia RN  Outcome: Progressing     Problem: Wound  Goal: Optimal Coping  6/13/2024 0424 by Laura Garcia RN  Outcome: Progressing  6/13/2024 0416 by Laura Garcia RN  Outcome: Progressing  Intervention: Support Patient and Family Response  Flowsheets (Taken 6/13/2024 0416)  Supportive Measures:   active listening utilized   self-responsibility promoted   positive reinforcement provided   counseling provided   decision-making supported   goal-setting facilitated   guided imagery facilitated   journaling promoted   self-reflection promoted   self-care encouraged   relaxation techniques promoted   problem-solving facilitated   verbalization of feelings encouraged  Family/Support System Care:    caregiver stress acknowledged   involvement promoted   self-care encouraged   support provided   presence promoted  Goal: Optimal Functional Ability  6/13/2024 0424 by Laura Garcia RN  Outcome: Progressing  6/13/2024 0416 by Laura Garcia RN  Outcome: Progressing  Intervention: Optimize Functional Ability  Flowsheets (Taken 6/13/2024 0416)  Activity Management:   Ambulated -L4   Ambulated to bathroom - L4   Ankle pumps - L1   Arm raise - L1  Activity Assistance Provided:   assistance, 1 person   assistance, stand-by  Goal: Absence of Infection Signs and Symptoms  6/13/2024 0424 by Laura Garcia RN  Outcome: Progressing  6/13/2024 0416 by Laura Garcia RN  Outcome: Progressing  Intervention: Prevent or Manage Infection  Flowsheets (Taken 6/13/2024 0416)  Fever Reduction/Comfort Measures:   lightweight bedding   lightweight clothing   fluid intake increased  Infection Management: aseptic technique maintained  Isolation Precautions: precautions maintained  Goal: Improved Oral Intake  6/13/2024 0424 by Laura Garcia RN  Outcome: Progressing  6/13/2024 0416 by Laura Garcia RN  Outcome: Progressing  Intervention: Promote and Optimize Oral Intake  Flowsheets (Taken 6/13/2024 0416)  Nutrition Interventions:   frequent small meals provided   food preferences provided   diet advanced   meal set-up provided   supplemental foods provided   supplemental drinks provided  Goal: Optimal Pain Control and Function  6/13/2024 0424 by Laura Garcia RN  Outcome: Progressing  6/13/2024 0416 by Laura Garcia RN  Outcome: Progressing  Intervention: Prevent or Manage Pain  Flowsheets (Taken 6/13/2024 0416)  Sleep/Rest Enhancement:   awakenings minimized   consistent schedule promoted   noise level reduced   regular sleep/rest pattern promoted   family presence promoted   relaxation techniques promoted   room darkened  Pain Management Interventions:   around-the-clock dosing utilized   care  clustered   diversional activity provided   relaxation techniques promoted   quiet environment facilitated   prescribed exercises encouraged   pillow support provided   pain management plan reviewed with patient/caregiver   position adjusted   premedicated for activity  Goal: Skin Health and Integrity  6/13/2024 0424 by Laura Garcia RN  Outcome: Progressing  6/13/2024 0416 by Laura Garcia RN  Outcome: Progressing  Goal: Optimal Wound Healing  6/13/2024 0424 by Laura Garcia RN  Outcome: Progressing  6/13/2024 0416 by Laura Garcia RN  Outcome: Progressing     Problem: Fall Injury Risk  Goal: Absence of Fall and Fall-Related Injury  6/13/2024 0424 by Laura Garcia RN  Outcome: Progressing  6/13/2024 0416 by Laura Garcia RN  Outcome: Progressing  Intervention: Identify and Manage Contributors  Flowsheets (Taken 6/13/2024 0416)  Self-Care Promotion:   independence encouraged   BADL personal objects within reach   BADL personal routines maintained   meal set-up provided   adaptive equipment use encouraged  Medication Review/Management:   medications reviewed   high-risk medications identified   infusion initiated  Intervention: Promote Injury-Free Environment  Flowsheets (Taken 6/13/2024 0416)  Safety Promotion/Fall Prevention:   bed alarm set   commode/urinal/bedpan at bedside   Fall Risk reviewed with patient/family   diversional activities provided   Fall Risk signage in place   family to remain at bedside   high risk medications identified   lighting adjusted   medications reviewed   muscle strengthening facilitated   nonskid shoes/socks when out of bed   side rails raised x 3   Supervised toileting - stay within arms reach   toileting scheduled   supervised activity   instructed to call staff for mobility     Problem: Pain Acute  Goal: Optimal Pain Control and Function  6/13/2024 0424 by Laura Garcia, RN  Outcome: Progressing  6/13/2024 0416 by Laura Garcia, ALONSO  Outcome:  Progressing     Problem: Comorbidity Management  Goal: Blood Pressure in Desired Range  6/13/2024 0424 by Laura Garcia, RN  Outcome: Progressing  6/13/2024 0416 by Laura Garcia, RN  Outcome: Progressing  Intervention: Maintain Blood Pressure Management  Flowsheets (Taken 6/13/2024 0416)  Medication Review/Management:   medications reviewed   high-risk medications identified   infusion initiated  Goal: Maintenance of Osteoarthritis Symptom Control  6/13/2024 0424 by Lauar Garcia, RN  Outcome: Progressing  6/13/2024 0416 by Laura Garcia, RN  Outcome: Progressing  Intervention: Maintain Osteoarthritis Symptom Control  Flowsheets (Taken 6/13/2024 0416)  Activity Management:   Ambulated -L4   Ambulated to bathroom - L4   Ankle pumps - L1   Arm raise - L1  Medication Review/Management:   medications reviewed   high-risk medications identified   infusion initiated

## 2024-06-13 NOTE — PLAN OF CARE
PT evaluation complete. Goals established and met. Pt is baseline with mobility and has no acute PT needs at this time. D/C from PT services.    Problem: Physical Therapy  Goal: Physical Therapy Goal  Description: Goals to be met by: 2024     Patient will increase functional independence with mobility by performin. Gait  x 300 feet with Supervision using no AD. - met   Outcome: Met     2024

## 2024-06-13 NOTE — DISCHARGE INSTRUCTIONS
Post op Spine Patient Instructions    Activity Restrictions:  [x]  Return to work will be determined on an individual basis.  [x]  No lifting greater than 5-10 pounds.  [x]  Avoid bending and twisting the area of your surgery more than 45 degrees from neutral position in any direction.  [x]  No driving or operating machinery:  [x]  until cleared by your surgeon.  [x]  while taking narcotic pain medications or muscle relaxants.  [x]  Wear your brace for comfort when out of bed.   [x]  Increase ambulation over the next 2 weeks. Walk on paved surfaces only. It is okay to walk up and down stairs while holding onto a side rail.  [x]  No sexual activity for 2 weeks.    Discharge Medication/Follow-up:  [x]  Please refer to discharge medication reconciliation form.  [x]  Take Tylenol (acetaminophen) 650 mg every 6 hours as needed for additional pain control.  [x]  Do not take ANY Aspirin or Aspirin containing products for 1 week after surgery.   [x]  Do not take ANY herbal supplements for 2 weeks after surgery.    [x]  Do not take ANY non-steroidal anti-inflammatory drugs (NSAIDS), including the following: ibuprofen, naprosyn, Aleve, Advil, Indocin, Mobic, or Celebrex for 12 weeks after surgery.   [x]  Prescriptions for appropriate medication will be given upon discharge.  [x]  Take the pain medication as prescribed. We recommend to wean use of your pain medication after 1 week of taking as prescribed (Ex: After 1 week of taking every 4 hours as needed, wean down to taking your pain medication every 6 hours as needed).  [x]  Take docusate (Colace 100 mg): take one capsule a day as needed for constipation. You can get this over the counter.  [x]  Follow-up appointment:  [x]  10-14 days post-op for wound check by physician assistant/nurse  [x]  4-6 weeks with MD:  [x]  with x-rays  [x]  An appointment will be mailed to you.    Wound Care:  [x]  Remove the small dressing near your incision in 2 days.   [x]  No bandage  required. Keep your incision open to the air. You have dermabond (skin glue) covering your incision. This will begin to flake off over the next 2 weeks. Do not remove this on your own, allow it to peel off. Do not apply ointments or creams to your incision.  [x]  You may shower on the 2nd day after your surgery. Do not allow the force of water to hit the incision. Ok to allow water to rinse over the incision. Pat the incision dry if it becomes wet. Do not rub or scrub the incision.  [x]  You cannot take a bath until 8 weeks after surgery.      Call your doctor or go to the Emergency Room for any signs of infection, including: increased redness, drainage, pain, or fever (temperature ?101). Call your doctor or go to the Emergency Room if there are any localized neurological changes; problems with speech, vision, numbness, tingling, weakness, or severe headache; inability to control urination or bowel movements; inability to urinate; or for other concerns.      Special Instructions:  [x]  No use of tobacco products.  [x]  Diet: Please eat a regular diet as tolerated.      Physicians need 3 days' notice for pain medicine to be refilled. Pain medicine will only be refilled between 8 AM and 5 PM, Monday through Friday, due to Food and Drug Administration regulation of documentation.    If you have any questions about this form, please call [phone].    Form No. 74746 (Revised 10/31/2013)

## 2024-06-13 NOTE — PLAN OF CARE
Pt does not require skilled OT services at this time. [facility] Please re-consult if any changes.

## 2024-06-13 NOTE — PT/OT/SLP EVAL
Physical Therapy Evaluation and Treatment and Discharge Note    Patient Name:  Leonardo Pisano   MRN:  [id number]    Recommendations:     Discharge Recommendations:  No Therapy Indicated  Discharge Equipment Recommendations: none   Barriers to discharge: None    Assessment:     Leonardo Pisano is a 69 y.o. male admitted with a medical diagnosis of Cervical stenosis of spinal canal. Patient is able to complete all visualized functional mobility with stand by assistance. They are functioning at their baseline and no longer require acute care physical therapy.    Plan:     During this hospitalization, patient does not require further acute PT services. Please re-consult if situation changes.      Subjective     Chief Complaint: None verbalized  Patient/Family Comments/goals: To go home  Pain/Comfort:  Pain Rating 1: 0/10    Patients cultural, spiritual, Muslim conflicts given the current situation: no    Living Environment:  Living Environment: Patient lives with their spouse in a single story house with number of outside stair(s): 3 with R HR and tub-shower combo. Pt discharging to home in [place] with 15 JUWAN and B HR with WIS.  Prior Level of Function: Prior to admission, patient was independent.  Equipment Used at Home: none.  DME owned (not currently used): none  Assistance Upon Discharge: family    Objective:     Communicated with RN prior to session.  Patient found up in chair with  (No active lines) upon PT entry to room.    General Precautions: Standard, fall   Orthopedic Precautions:N/A   Braces: N/A    Exams:  Cognitive Exam:  Patient is oriented to Person, Place, Time, Situation  RLE ROM: WFL  RLE Strength: WFL  LLE ROM: WFL  LLE Strength: WFL  Sensation: Intact light touch to BLEs    Functional Mobility:  Bed Mobility:     Seated in chair at start of session and returned to chair  Transfers:     Sit to Stand: independence with no AD  Gait: Patient ambulated 400' with no AD and supervision.   Patient  demonstrates steady gait, decreased step length, narrow base of support, decreased weight shift, and decreased arm swing.   Patient required cues for upright posture, gluteal activation, sequencing, increased step size, foot placement, and increased foot clearance.  All lines remained intact throughout ambulation trial, gait belt utilized.  Stairs:  Pt ascended/descended 1 flight(s) with No Assistive Device with bilateral handrails with Stand-by Assistance.     Therapeutic Activities and Exercises:  Patient educated on role of acute care PT and PT POC, safety while in hospital including calling nurse for mobility, and call light usage  Pt educated on the effects of bed rest and the importance of OOB activity. Pt encouraged to sit UIC majority of day as tolerated and continue daily ambulation with nursing/family assist. Pt verbalized understanding.  Educated about gradual progression of activity once out of hospital  Educated about safety with functional mobility  Answered all questions within PT scope of practice to patient's satisfaction    AM-PAC 6 CLICK MOBILITY  Total Score:22     Patient left up in chair with all lines intact, call button in reach, RN notified, and spouse present.    GOALS:   Multidisciplinary Problems       Physical Therapy Goals       Not on file              Multidisciplinary Problems (Resolved)          Problem: Physical Therapy    Goal Priority Disciplines Outcome Goal Variances Interventions   Physical Therapy Goal   (Resolved)     PT, PT/OT Met     Description: Goals to be met by: 2024     Patient will increase functional independence with mobility by performin. Gait  x 300 feet with Supervision using no AD. - met                        History:     Past Medical History:   Diagnosis Date    Back pain     Degenerative disc disease     Disorder of kidney and ureter     stone    Essential hypertension 2018    Hepatitis     Hyperlipidemia     Hypoxia 2021     Obesity     Pneumonia     Pneumonia due to COVID-19 virus 06/03/2021    Trouble in sleeping     due to pain       Past Surgical History:   Procedure Laterality Date    FUSION, SPINE, CERVICAL, ANTERIOR APPROACH N/A 6/12/2024    Procedure: C5-6,C6-7 ANTERIOR CERVICAL DISCECTOMY AND FUSION;  Surgeon: Umberto Leslie MD;  Location: [facility] OR [address] [place] [address];  Service: Neurosurgery;  Laterality: N/A;    SHOULDER SURGERY  4/25/12    rt shoulder       Time Tracking:     PT Received On: 06/13/24  PT Start Time: 1037     PT Stop Time: 1050  PT Total Time (min): 13 min     Billable Minutes: Evaluation 5 Gait Training 8      06/13/2024

## 2024-06-13 NOTE — NURSING TRANSFER
Nursing Transfer Note      6/12/2024   10:09 PM    Nurse giving handoff:ALONSO Carbajal  Nurse receiving handoff:REUBEN Sanchez RN    Reason patient is being transferred: post anesthesia    Transfer To: 931    Transfer via stretcher      Transported by RN    Transfer Vital Signs:  Blood Pressure:142/68  Heart Rate:67  O2:94%-- RA  Temperature:98.1  Respirations:15        4eyes on Skin: yes    Medicines sent: n/a    Any special needs or follow-up needed: n/a    Patient belongings transferred with patient: No    Chart send with patient: Yes    Notified: spouse    Patient reassessed at: 6/12/24 @ 2200     Upon arrival to floor: patient oriented to room, call bell in reach, and bed in lowest position

## 2024-06-13 NOTE — DISCHARGE SUMMARY
[facility] - Neurosurgery ([facility])  Neurosurgery  Discharge Summary      Patient Name: Leonardo Pisano  MRN: [id number]  Admission Date: 6/12/2024  Hospital Length of Stay: 0 days  Discharge Date and Time:  06/13/2024 1:03 PM  Attending Physician: Umberto Leslie MD   Discharging Provider: Annamarie Lobo PA-C  Primary Care Provider: Saud Coles MD    HPI:   69M w/ PMH HTN, HLD and progressive low back pain with lumbar spondylosis who presents for C5-7 ACDF for severe cervical stenosis prior to his lumbar operation. States that he was followed by Dr. Presley and a MRI of the entire spine was obtained in 2022 which showed multilevel degenerative changes of the neck and low back and a pars defect at L5-S1. It was recommended that the patient proceed with cervical surgery prior to back surgery. He reports he did PT and injections in the past for his lower back which was helpful. He denies any pain in the extremities, weakness in the extremities, numbness/tingling in the extremities, clumsiness, gait dysfunction,  weakness. States that his back pain improved so he decided not to proceed with surgery. Until recently, he has noticed increased leg weakness over the past 6 months. He is taking Aleve with mild relief. Pain is worse with prolonged standing and bending. Pain improves with sitting and elevating his legs.     Procedure(s) (LRB):  C5-6,C6-7 ANTERIOR CERVICAL DISCECTOMY AND FUSION (N/A)     Hospital Course: The patient presented for the above stated procedure. The patient tolerated the procedure well and there were no intra-operative complications. After surgery, the patient was extubated and taken to recovery in stable condition.  After observation in recovery, the patient was transferred to the neurosurgical floor.  Reports pain is controlled, denies weakness/numbness. Voiding. Tolerating PO w/o dysphagia. Ambulating well in halls. Post-op X-rays showed good placement of the hardware. PT/OT were  consulted, the patient progressed, and was cleared to go home without DME needs.  Drain was removed on 6/13 without complication.  On 6/13 he was discharged home with pain medication and follow up appointments. The patient was tolerating a regular diet and voiding without difficulty. Activity as tolerated. At the time of discharge, the patient was neurologically stable, was afebrile, and vital signs were stable. Discharge instructions were given verbally/written to the patient and their family, including wound care and follow-up appointments, and all of their questions were answered. The patient was also given a discharge instruction sheet explaining the aforementioned discussion.  The patient verbalized understanding of instructions and agreed to the plan. They were encouraged to call the clinic with any questions they might have prior to the follow up appointments.     General: well developed, well nourished, no distress.   Head: normocephalic, atraumatic  Neurologic: Alert and oriented. Thought content appropriate.  GCS: Motor: 6/Verbal: 5/Eyes: 4 GCS Total: 15  Mental Status: Awake, Alert, Oriented x 4  Language: No aphasia  Speech: No dysarthria  Cranial nerves: face symmetric, tongue midline, CN II-XII grossly intact.   Eyes: pupils equal, round, reactive to light with accomodation, EOMI.   Pulmonary: normal respirations, no signs of respiratory distress  Sensory: intact to light touch throughout  Motor Strength: Moves all extremities spontaneously with good tone.  Full strength upper and lower extremities. No abnormal movements seen.     Strength  Deltoids Triceps Biceps Wrist Extension Wrist Flexion Hand    Upper: R 5/5 5/5 5/5 5/5 5/5 5/5    L 5/5 5/5 5/5 5/5 5/5 5/5     Iliopsoas Quadriceps Knee  Flexion Tibialis  anterior Gastro- cnemius EHL   Lower: R 5/5 5/5 5/5 5/5 5/5 5/5    L 5/5 5/5 5/5 5/5 5/5 5/5     Skin: Skin is warm, dry and intact.  Incision c/d/I with skin edges well approximated with  dermabond. No surrounding erythema or edema. No drainage from incision. No palpable hematoma.        Goals of Care Treatment Preferences:  Code Status: Full Code      Consults: PT/OT    Significant Diagnostic Studies: Labs: BMP:   Recent Labs   Lab 06/13/24  0516   *   *   K 4.5      CO2 19*   BUN 18   CREATININE 0.9   CALCIUM 9.1   , CBC   Recent Labs   Lab 06/13/24  0516   WBC 10.02   HGB 14.0   HCT 41.2      , and All labs within the past 24 hours have been reviewed  Radiology: XR cervical spine     Pending Diagnostic Studies:       Procedure Component Value Units Date/Time    X-Ray Cervical Spine AP And Lateral [[id number]] Resulted: 06/13/24 1229    Order Status: Sent Lab Status: In process Updated: 06/13/24 1238          Final Active Diagnoses:    Diagnosis Date Noted POA    PRINCIPAL PROBLEM:  Cervical stenosis of spinal canal [M48.02] 12/14/2023 Yes    Hyponatremia [E87.1] 06/13/2024 Yes    Pain [R52] 06/13/2024 Yes    Nausea [R11.0] 06/13/2024 Yes    Obesity (BMI 30-39.9) [E66.9] 12/14/2023 Yes    Mixed hyperlipidemia [E78.2] 10/26/2018 Yes    Essential hypertension [I10] 04/03/2018 Yes      Problems Resolved During this Admission:      Discharged Condition: good     Disposition: Home or Self Care    Follow Up: 2 weeks wound check    Patient Instructions:      Notify your health care provider if you experience any of the following:  temperature >100.4     Notify your health care provider if you experience any of the following:  persistent nausea and vomiting or diarrhea     Notify your health care provider if you experience any of the following:  severe uncontrolled pain     Notify your health care provider if you experience any of the following:  redness, tenderness, or signs of infection (pain, swelling, redness, odor or green/yellow discharge around incision site)     Notify your health care provider if you experience any of the following:  difficulty breathing or increased  cough     Notify your health care provider if you experience any of the following:  severe persistent headache     Notify your health care provider if you experience any of the following:  worsening rash     Notify your health care provider if you experience any of the following:  persistent dizziness, light-headedness, or visual disturbances     Notify your health care provider if you experience any of the following:  increased confusion or weakness     Activity as tolerated     Medications:  Reconciled Home Medications:      Medication List        START taking these medications      ondansetron 8 MG Tbdl  Commonly known as: ZOFRAN-ODT  Dissolve 1 tablet (8 mg total) by mouth every 6 (six) hours as needed (nausea).     oxyCODONE 5 MG immediate release tablet  Commonly known as: ROXICODONE  Take 1 tablet (5 mg total) by mouth every 4 (four) hours as needed for Pain.            CONTINUE taking these medications      ascorbic acid (vitamin C) 500 MG tablet  Commonly known as: VITAMIN C  Take 1 tablet (500 mg total) by mouth 2 (two) times daily.     lisinopriL 5 MG tablet  Commonly known as: PRINIVIL,ZESTRIL  Take 1 tablet (5 mg total) by mouth once daily.     multivitamin capsule  Take 1 capsule by mouth once daily.            ASK your doctor about these medications      aspirin 81 MG EC tablet  Commonly known as: ECOTRIN  Take 1 tablet (81 mg total) by mouth once daily.              Annamarie Lobo PA-C  Neurosurgery  [facility] - Neurosurgery [facility])

## 2024-06-13 NOTE — CARE UPDATE
I have reviewed the chart of Leonardo Pisano who is hospitalized for the following:    Active Hospital Problems    Diagnosis    *Cervical stenosis of spinal canal     Scheduled for neurosurgery in 2 weeks.  Needs surgical clearance.      Hyponatremia     POA  Monitor with daily cmp      Pain     Post op  Prn pain medicine      Nausea     zofran      Obesity (BMI 30-39.9)    Mixed hyperlipidemia     Low fat diet.  Avoid sweets.  A 10 pound weight loss by the next visit as a  good goal.  Increase consumption of fruits and vegetables, fish and chicken.  Crestor caused elevated LFTs.  Consider restarting Crestor if LFTs returned to normal.      Essential hypertension     Two gram sodium diet.    Weight loss discussed.    Try to walk 2 miles per day.    Avoid smoking.    Current medications will be:  Lisinopril 10 mg daily          Shayna Schmidt NP  [facility]

## 2024-06-13 NOTE — NURSING TRANSFER
Nursing Transfer Note      6/12/2024   11:06 PM    Nurse giving handoff: ALONSO Whalen PACU  Nurse receiving handoff: ALONSO Sanchez NPU    Reason patient is being transferred: NPU post op care    Transfer To: 931 from PACU    Transfer via stretcher    Transfer with cervical collar in place    Transported by Rn    Transfer Vital Signs:  Blood Pressure:142/68  Heart Rate:72  O2:96  Temperature:97.2  Respirations:22    Order for Tele Monitor? No    Medicines sent: bacitracin and mupirocin    Any special needs or follow-up needed: per orders    Patient belongings transferred with patient: Yes - wife has belongings    Chart send with patient: Yes    Notified: spouse    Patient reassessed at: 6/12/24 22:46    Upon arrival to floor: patient oriented to room, call bell in reach, and bed in lowest position

## 2024-06-13 NOTE — PLAN OF CARE
[facility] - Neurosurgery (Hospital)  Discharge Final Note    Primary Care Provider: Saud Coles MD    Expected Discharge Date: 6/13/2024    Patient to be discharged home.  The patient does not have any home needs. Family to provide transportation home.  Neurosurgery clinic to schedule follow up apointment.    Future Appointments   Date Time Provider Department Center   6/26/2024 10:00 AM Nicole Patel RN [facility] NEUROS8 [facility]   7/25/2024  9:45 AM [facility] OIC-XRAY [facility]   7/25/2024 10:30 AM Umberto Leslie MD [facility] NEUROS8 [facility]   9/17/2024  2:00 PM Thania Starr NP [facility] HEPAT [facility]   12/9/2024 10:00 AM Saud Coles MD [facility]        Final Discharge Note (most recent)       Final Note - 06/13/24 1311          Final Note    Assessment Type Final Discharge Note     Anticipated Discharge Disposition Home or Self Care        Post-Acute Status    Post-Acute Authorization Other     Other Status No Post-Acute Service Needs     Discharge Delays None known at this time                     Important Message from Medicare

## 2024-06-13 NOTE — PLAN OF CARE
[facility] - [facility] (Hospital)  Initial Discharge Assessment       Primary Care Provider: Saud Coles MD    Admission Diagnosis: Cervical spondylosis with myelopathy [M47.12]  Cervical myelopathy [G95.9]    Admission Date: 6/12/2024  Expected Discharge Date: 6/14/2024    Transition of Care Barriers: None    Payor: [employer] MCARE [facility] / Plan: [employer] CHOICES / Product Type: Medicare Advantage /     Extended Emergency Contact Information  Primary Emergency Contact: Janette Pisano  Address: [address]           [place] [address] [facility]  Home Phone: [phone]  Relation: Spouse    Discharge Plan A: Home with family  Discharge Plan B: Home with family, Home Health      [id number] - [place], [place] - [address]  [address]  [place] [address]  Phone: [phone] Fax: [phone]    Cm obtained discharge planning assessment with patient, spouse at bedside. Patient provided with discharge planning booklet.  Initial Assessment (most recent)       Adult Discharge Assessment - 06/13/24 1237          Discharge Assessment    Assessment Type Discharge Planning Assessment     Confirmed/corrected address, phone number and insurance Yes     Confirmed Demographics Correct on Facesheet     Source of Information patient     Communicated LIZZETTE with patient/caregiver Yes     Reason For Admission Cervical stenosis of spinal canal     People in Home spouse     Do you expect to return to your current living situation? Yes     Do you have help at home or someone to help you manage your care at home? Yes     Who are your caregiver(s) and their phone number(s)? Janette Pisano - spoue [phone]     Prior to hospitilization cognitive status: Alert/Oriented     Current cognitive status: Alert/Oriented     Walking or Climbing Stairs Difficulty no     Dressing/Bathing Difficulty no     Equipment Currently Used at Home none     Readmission within 30 days? No     Patient currently  being followed by outpatient case management? No     Do you currently have service(s) that help you manage your care at home? No     Do you take prescription medications? Yes     Do you have prescription coverage? Yes     Coverage [facility] [facility] -     Do you have any problems affording any of your prescribed medications? No     Is the patient taking medications as prescribed? yes     Who is going to help you get home at discharge? family     How do you get to doctors appointments? car, drives self;family or friend will provide     Are you on dialysis? No     Do you take coumadin? No     Discharge Plan A Home with family     Discharge Plan B Home with family;Home Health     DME Needed Upon Discharge  other (see comments)   tbd    Discharge Plan discussed with: Patient;Spouse/sig other     Name(s) and Number(s) Janette vidales [phone]     Transition of Care Barriers None        Physical Activity    On average, how many days per week do you engage in moderate to strenuous exercise (like a brisk walk)? 7 days     On average, how many minutes do you engage in exercise at this level? 20 min        Financial Resource Strain    How hard is it for you to pay for the very basics like food, housing, medical care, and heating? Not hard at all        Housing Stability    In the last 12 months, was there a time when you were not able to pay the mortgage or rent on time? No     At any time in the past 12 months, were you homeless or living in a shelter (including now)? No        Transportation Needs    Has the lack of transportation kept you from medical appointments, meetings, work or from getting things needed for daily living? No        Food Insecurity    Within the past 12 months, you worried that your food would run out before you got the money to buy more. Never true     Within the past 12 months, the food you bought just didn't last and you didn't have money to get more.  Never true        Stress    Do you feel stress - tense, restless, nervous, or anxious, or unable to sleep at night because your mind is troubled all the time - these days? Not at all        Social Isolation    How often do you feel lonely or isolated from those around you?  Never        Alcohol Use    Q1: How often do you have a drink containing alcohol? Monthly or less     Q2: How many drinks containing alcohol do you have on a typical day when you are drinking? 1 or 2     Q3: How often do you have six or more drinks on one occasion? Never        Utilities    In the past 12 months has the electric, gas, oil, or water company threatened to shut off services in your home? No        Health Literacy    How often do you need to have someone help you when you read instructions, pamphlets, or other written material from your doctor or pharmacy? Never        OTHER    Name(s) of People in Home Janette - spouse

## 2024-06-13 NOTE — NURSING
Patient  D/C'd to home via wheelchair.  AVS reviewed with patient.  Patient verbalized a good understanding of information given and follow up appointment.

## 2024-06-13 NOTE — TRANSFER OF CARE
Anesthesia Transfer of Care Note    Patient: Leonardo Pisano    Procedure(s) Performed: Procedure(s) (LRB):  C5-6,C6-7 ANTERIOR CERVICAL DISCECTOMY AND FUSION (N/A)    Patient location: PACU    Anesthesia Type: general    Transport from OR: Transported from OR on 6-10 L/min O2 by face mask with adequate spontaneous ventilation    Post pain: adequate analgesia    Post assessment: no apparent anesthetic complications and tolerated procedure well    Post vital signs: stable    Level of consciousness: sedated and responds to stimulation    Nausea/Vomiting: no nausea/vomiting    Complications: none    Transfer of care protocol was followed    Last vitals: Visit Vitals  BP (!) 152/70 (BP Location: Right arm, Patient Position: Lying)   Pulse 89   Temp 36.2 °C (97.2 °F) (Temporal)   Resp 19   SpO2 99%

## 2024-06-13 NOTE — NURSING
Patient Transferred to NPU Room 931       Upon arrival to the floor, patient greeted and oriented to room. Complete head to toe assessment completed per protocol. VSS, see flowsheet for details. Neuro assessment completed. Primary team notified of patient's transfer to floor. All current and transfer orders reviewed/reconciled per protocol. All emergency equipment set up in patient's room. Fall/seizure precautions initiated per providers orders. 4 Eyes skin assessment performed, see below for details. Reviewed assessment and rounding frequency with patient and family. Questions were encouraged and addressed. Repositioned patient for comfort with bed locked in lowest position, side rails up x 3, bed alarm set, and call light within reach. Instructed patient to call staff for mobility/assistance, verbalized understanding. No acute signs of distress noted at this time.     Nurses Note -- 4 Eyes      Skin assessed during: Admit      [] No Altered Skin Integrity Present    [x]Prevention Measures Documented      [x] Yes- Altered Skin Integrity Present or Discovered   [x] LDA Added if Not in Epic (Describe Wound)   [x] New Altered Skin Integrity was Present on Admit and Documented in LDA as Incision with J/P drain.    [] Wound Image Taken  [place] Collar in place for comfort. All areas of skin contact evaluated and no irritation noted.   Wound Care Consulted? NO  Attending Nurse:  ALONSO Sanchez   Second RN/Staff Member: ALONSO Bowser        SCD's and Obi's applied to patient per provider's orders. Patient S/P Neck Surgery with Aspen Collar on and in place.  1 CASIE drain noted on floor arrival, see intake/output flowsheets for details. Post op xray to be completed in AM per Neurosurgery Dr Mihir Canela . Orders for Aspen c-collar to be worn for comfort when OOB at all times. Pain regimen reviewed with patient. Questions were encourage and addressed. Mobility protocol reviewed with patient, verbalized understanding.

## 2024-06-14 NOTE — ANESTHESIA POSTPROCEDURE EVALUATION
Anesthesia Post Evaluation    Patient: Leonardo Pisano    Procedure(s) Performed: Procedure(s) (LRB):  C5-6,C6-7 ANTERIOR CERVICAL DISCECTOMY AND FUSION (N/A)    Final Anesthesia Type: general      Patient location during evaluation: PACU  Patient participation: Yes- Able to Participate  Level of consciousness: awake and alert and oriented  Post-procedure vital signs: reviewed and stable  Pain management: adequate  Airway patency: patent    PONV status at discharge: No PONV  Anesthetic complications: no      Cardiovascular status: blood pressure returned to baseline, hemodynamically stable and stable  Respiratory status: unassisted, room air and spontaneous ventilation  Hydration status: euvolemic  Follow-up not needed.              Vitals Value Taken Time   /65 06/13/24 1146   Temp 36.6 °C (97.9 °F) 06/13/24 1146   Pulse 62 06/13/24 1146   Resp 18 06/13/24 1146   SpO2 96 % 06/13/24 1146         Event Time   Out of Recovery 19:15:00         Pain/Truong Score: Pain Rating Prior to Med Admin: 2 (6/13/2024  2:50 PM)  Pain Rating Post Med Admin: 0 (6/13/2024 10:01 AM)  Truong Score: 10 (6/12/2024  7:15 PM)

## 2024-06-17 NOTE — OP NOTE
DATE OF SURGERY: 6/12/24    PREOPERATIVE DIAGNOSIS:  1. Degenerative cervical myelopathy with stenosis at C5-6 and C6-7    POSTOPERATIVE DIAGNOSIS:  1. Same    PROCEDURE PERFORMED:  1. Anterior cervical discectomy and fusion, C5-6 and C6-7  2. Application of PEEK interbody cage with integrated fixation, C5-6 and C6-7 (Globus)  3. Use of intraoperative microscope for microdissection  4. Use of neuromonitoring with MEPs  5. Use of intraoperative fluoroscopy  6. DBM bone grafting    SURGEON: Umberto Leslie M.D.    ASSISTANT: Jhonathan Anderson M.D.    ANESTHESIA: GETA    ESTIMATED BLOOD LOSS: Minimal    COMPLICATIONS: None    DRAINS: Deep Hemovac    SPECIMENS SENT: None    INDICATIONS:    This is a 69M who presented with signs and symptoms of progressive degenerative cervical myelopathy. Imaging showed severe stenosis at C5-6 and C6-7 with cord compression. Conservative measures were not successful. As such, I recommended a C5-6 and C6-7 anterior cervical discectomy and fusion.    Risks, benefits, alternatives, indications and methods were reviewed in detail and the patient wished to proceed. All questions were answered. No guarantees about the results of the procedure were made.    PROCEDURE:    The patient was brought into the operating room where he was intubated and placed under general anesthesia without difficulty. All lines were placed. The patient was positioned supine onto the operating table with appropriate padding of all pressure points. The head and neck were placed in slight extension, resting on a surgical pillow. Lateral x-rays were taken for localization and to assess cervical lordosis. Baseline MEPs were run and found to be present and stable in all extremities. The right neck was marked, prepped and draped in the usual sterile fashion. A timeout was performed prior to the procedure. Ten mL of Lidocaine with epinephrine were injected into the skin.    A transverse linear skin incision was made at the right  neck and Weitlaner retractors were used to widen the incision. The platysma was divided sharply with Metzenbaum scissors. A sub-plastysmal dissection was carried out with Bovie electrocautery. A Garza Gaona approach to the the anterior cervical spine was performed in the usual fashion. The carotid artery was palpated lateral to the working corridor. The prevertebral soft tissues were bluntly dissected with Kitner dissectors. A spinal needle was placed into the presumed C5-6 disc space, and was confirmed on lateral x-ray. Subperiosteal dissection was carried out at C5, C6 and C7 vertebral bodies with Bovie electrocautery.     An annulotomy at C5-6 and C6-7 was performed with the 15 blade. Pituitary rongeurs and curettes were used to remove disc material at both levels. Anterior osteophytes were removed with the rongeur and Kerrison punches at both levels. The end plates were prepared with straight curettes. The microscope was then brought into the field for microdissection. Posterior osteophytes were removed with the high speed drill and Kerrison punches at both levels. The posterior longitudinal ligament was opened and resected with curettes and kerrison punches at both levels. Adequate central and neuroforaminal decompression was achieved at C5-6 and C6-7 and confirmed with a nerve hook.     An intervertebral trial spacer was placed, and a size 6 PEEK cage was packed with DBM for anterior arthrodesis at both C5-6 and C6-7 . The cages were then countersunk into the C5-6 and C6-7 disc spaces. Integrated screw fiixation was deployed under flouroscopy at both levels. MEPs were run after this was completed and found to be consistent with baseline. The microscope was taken out of the field. All hardware was found to be in adequate position on AP and lateral xrays. The locking screws were tightened.    The wound was copiously irrigated with normal saline and hemostasis was achieved with bipolar electrocautery.  Depomedrol was placed over the esophagus and the retropharyngeal space. One gram of Vancomycin powder was placed into the wound. One deep Hemovac drain was placed. The platysma was reapproximated with interrupted inverted 3.0 Vicryl sutures and a subcuticular stitch was placed with 4.0 Monocryl. Dermabond was placed at the skin.    The patient tolerated the procedure well from a hemodynamic and neuromonitoring standpoint. MEPs were present and stable throughout the case. I was present for all critical portions of the case, and at the end of the case all counts were correct. The patient was repositioned supine onto the hospital bed where he was extubated and allowed to emerge from anesthesia without difficult. The patient was sent to the PACU in stable condition for recovery.

## 2024-06-26 ENCOUNTER — CLINICAL SUPPORT (OUTPATIENT)
Dept: NEUROSURGERY | Facility: CLINIC | Age: 69
End: 2024-06-26
Payer: MEDICARE

## 2024-06-26 VITALS — SYSTOLIC BLOOD PRESSURE: 123 MMHG | HEART RATE: 66 BPM | TEMPERATURE: 98 F | DIASTOLIC BLOOD PRESSURE: 71 MMHG

## 2024-06-26 DIAGNOSIS — Z98.1 S/P CERVICAL SPINAL FUSION: Primary | ICD-10-CM

## 2024-06-26 PROCEDURE — 99999 PR PBB SHADOW E&M-EST. PATIENT-LVL III: CPT | Mod: PBBFAC,,,

## 2024-06-26 NOTE — PROGRESS NOTES
Leonardo Pisano is a 69 y.o. male here for 2 week post op wound check here on 8th floor neurosurgery clinic    Surgery: c5-6, c6-7 acdf    Symptoms: slight swallowing difficulty at first, however now resolving/ stiffness in between shoulder blades that comes and goes    DME:none    Brace: none    Pain: none    Medication Refills: none       Incision with dermabond  RAMÓN, well approximated, no redness, swelling or purulent drainage.     EDUCATION:    Instructed patient to keep incision RAMÓN, no lotions, creams or bandages. OK to shower without water pressure to incision. No scrubbing. Pat dry.  No submurging incision in water (no bathing/swimming) until 8 weeks after surgery once wound is healed. Do not lift more than 10 pounds. Avoid bending or twisting in the area of your surgery more than 45 degrees from neutral position in any direction. Wear brace at all times when up out of bed except when showering or flat in bed. Increase ambulation as tolerated. You should be walking 2 miles/day. Walk on paved surfaces only, holding side rail when using stairs.  No sexual activity for 6 weeks after surgery.  No driving or operating heavy machinary : until cleared by surgeon  or while you are taking narcotic pain medication or muscle relaxant.  If you have had a fusion, do not take any non steroidal anti-inflammatory drugs (NSAIDS) Including the following: Ibuprofen, naprosyn, Aleve, Advil, Indocin, Mobic, or Celebrex for 6 weeks.  Take docusate (colace 100mg): take 1 capsule a day as needed for constipation. You can purchase over the counter.  Avoid use of tobacco products.    Call your doctor or go to the Emergency Room for any signs of infection including: increased redness, drainage, pain or fever (temperature of 101.5 or above for 24hrs). Call your doctor or go to the ER if there are any localized neurological changes, problems with speech, vision,numbness,tingling,weakness,severe headache or other concerns.  Above  instructions reviewed with pt and copy of instructions given to pt along with follow up appt date and time.    Physicians need 3 days' notice for pain medicine to be refilled. Pain medicine will only be refilled between 8am and 5pm Monday-Friday.    Patient verbalizes understanding. All questions answered. Pt. Encouraged to call or send message thru the portal for any additional concerns. Patient to followup with Dr. Leslie for 6 week followup with/without imaging.    Future Appointments   Date Time Provider Department Center   7/25/2024  9:45 AM [facility] OIC-XRAY [facility]   7/25/2024 10:30 AM Umberto Leslie MD [facility] NEUROS8 [facility]   9/17/2024  2:00 PM Thania Starr NP [facility] HEPAT [facility]   12/9/2024 10:00 AM Saud Coles MD [facility]

## 2024-07-25 ENCOUNTER — OFFICE VISIT (OUTPATIENT)
Dept: NEUROSURGERY | Facility: CLINIC | Age: 69
End: 2024-07-25
Payer: MEDICARE

## 2024-07-25 ENCOUNTER — HOSPITAL ENCOUNTER (OUTPATIENT)
Dept: RADIOLOGY | Facility: HOSPITAL | Age: 69
Discharge: HOME | End: 2024-07-25
Attending: NEUROLOGICAL SURGERY
Payer: MEDICARE

## 2024-07-25 VITALS — HEART RATE: 59 BPM | TEMPERATURE: 98 F | SYSTOLIC BLOOD PRESSURE: 114 MMHG | DIASTOLIC BLOOD PRESSURE: 74 MMHG

## 2024-07-25 DIAGNOSIS — M41.9 SCOLIOSIS, UNSPECIFIED SCOLIOSIS TYPE, UNSPECIFIED SPINAL REGION: ICD-10-CM

## 2024-07-25 DIAGNOSIS — R13.10 DYSPHAGIA, UNSPECIFIED TYPE: ICD-10-CM

## 2024-07-25 DIAGNOSIS — Z98.1 S/P CERVICAL SPINAL FUSION: ICD-10-CM

## 2024-07-25 DIAGNOSIS — M48.02 SPINAL STENOSIS, CERVICAL REGION: Primary | ICD-10-CM

## 2024-07-25 PROCEDURE — 3044F HG A1C LEVEL LT 7.0%: CPT | Mod: CPTII,S$GLB,, | Performed by: NURSE PRACTITIONER

## 2024-07-25 PROCEDURE — 72040 X-RAY EXAM NECK SPINE 2-3 VW: CPT | Mod: 26,,, | Performed by: RADIOLOGY

## 2024-07-25 PROCEDURE — 99024 POSTOP FOLLOW-UP VISIT: CPT | Mod: S$GLB,,, | Performed by: NURSE PRACTITIONER

## 2024-07-25 PROCEDURE — 3078F DIAST BP <80 MM HG: CPT | Mod: CPTII,S$GLB,, | Performed by: NURSE PRACTITIONER

## 2024-07-25 PROCEDURE — 1126F AMNT PAIN NOTED NONE PRSNT: CPT | Mod: CPTII,S$GLB,, | Performed by: NURSE PRACTITIONER

## 2024-07-25 PROCEDURE — 1159F MED LIST DOCD IN RCRD: CPT | Mod: CPTII,S$GLB,, | Performed by: NURSE PRACTITIONER

## 2024-07-25 PROCEDURE — 4010F ACE/ARB THERAPY RXD/TAKEN: CPT | Mod: CPTII,S$GLB,, | Performed by: NURSE PRACTITIONER

## 2024-07-25 PROCEDURE — 1101F PT FALLS ASSESS-DOCD LE1/YR: CPT | Mod: CPTII,S$GLB,, | Performed by: NURSE PRACTITIONER

## 2024-07-25 PROCEDURE — 3288F FALL RISK ASSESSMENT DOCD: CPT | Mod: CPTII,S$GLB,, | Performed by: NURSE PRACTITIONER

## 2024-07-25 PROCEDURE — 72040 X-RAY EXAM NECK SPINE 2-3 VW: CPT | Mod: TC

## 2024-07-25 PROCEDURE — 99999 PR PBB SHADOW E&M-EST. PATIENT-LVL III: CPT | Mod: PBBFAC,,, | Performed by: NURSE PRACTITIONER

## 2024-07-25 PROCEDURE — 1160F RVW MEDS BY RX/DR IN RCRD: CPT | Mod: CPTII,S$GLB,, | Performed by: NURSE PRACTITIONER

## 2024-07-25 PROCEDURE — 3074F SYST BP LT 130 MM HG: CPT | Mod: CPTII,S$GLB,, | Performed by: NURSE PRACTITIONER

## 2024-07-25 NOTE — PROGRESS NOTES
"Neurosurgery  Established Patient    SUBJECTIVE:     History of Present Illness: Leonardo Pisano is a 69 y.o. male s/p ACD C5-6 and C6-7 on 6/12/24 with Dr. Leslie. The patient is being seen in clinic today for his 6 week post-op evaluation. States that he is doing well overall since surgery. He has noticed some hoarseness and intermittent difficulties with swallowing; "as if something is caught in my throat". Denies new neurological deficits, pain, fever, or chills. He is eager to proceed with the back surgery.     Review of patient's allergies indicates:   Allergen Reactions    No known allergies        Current Outpatient Medications   Medication Sig Dispense Refill    lisinopriL (PRINIVIL,ZESTRIL) 5 MG tablet Take 1 tablet (5 mg total) by mouth once daily. 90 tablet 1    ascorbic acid, vitamin C, (VITAMIN C) 500 MG tablet Take 1 tablet (500 mg total) by mouth 2 (two) times daily. (Patient not taking: Reported on 6/26/2024) 60 tablet 0    aspirin (ECOTRIN) 81 MG EC tablet Take 1 tablet (81 mg total) by mouth once daily. (Patient not taking: Reported on 6/26/2024) 30 tablet 0    multivitamin capsule Take 1 capsule by mouth once daily. (Patient not taking: Reported on 6/26/2024)      ondansetron (ZOFRAN-ODT) 8 MG TbDL Dissolve 1 tablet (8 mg total) by mouth every 6 (six) hours as needed (nausea). 20 tablet 0    oxyCODONE (ROXICODONE) 5 MG immediate release tablet Take 1 tablet (5 mg total) by mouth every 4 (four) hours as needed for Pain. 42 tablet 0     No current facility-administered medications for this visit.       Past Medical History:   Diagnosis Date    Back pain     Degenerative disc disease     Disorder of kidney and ureter     stone    Essential hypertension 04/03/2018    Hepatitis     Hyperlipidemia     Hypoxia 06/02/2021    Obesity     Pneumonia     Pneumonia due to COVID-19 virus 06/03/2021    Trouble in sleeping     due to pain     Past Surgical History:   Procedure Laterality Date    FUSION, SPINE, " CERVICAL, ANTERIOR APPROACH N/A 6/12/2024    Procedure: C5-6,C6-7 ANTERIOR CERVICAL DISCECTOMY AND FUSION;  Surgeon: Umberto Leslie MD;  Location: [facility] OR [address] [place] [address];  Service: Neurosurgery;  Laterality: N/A;    SHOULDER SURGERY  4/25/12    rt shoulder     Family History       Problem Relation (Age of Onset)    Cancer Mother    Diabetes Daughter    Heart disease Father    Hyperlipidemia Sister, Sister          Social History     Socioeconomic History    Marital status:     Number of children: 2   Occupational History     Employer: [employer]   Tobacco Use    Smoking status: Never    Smokeless tobacco: Never   Substance and Sexual Activity    Alcohol use: No    Drug use: Never     Social Determinants of Health     Financial Resource Strain: Low Risk  (6/13/2024)    Overall Financial Resource Strain (CARDIA)     Difficulty of Paying Living Expenses: Not hard at all   Food Insecurity: No Food Insecurity (6/13/2024)    Hunger Vital Sign     Worried About Running Out of Food in the Last Year: Never true     Ran Out of Food in the Last Year: Never true   Transportation Needs: No Transportation Needs (6/13/2024)    TRANSPORTATION NEEDS     Transportation : No   Physical Activity: Insufficiently Active (6/13/2024)    Exercise Vital Sign     Days of Exercise per Week: 7 days     Minutes of Exercise per Session: 20 min   Stress: No Stress Concern Present (6/13/2024)    [employer] - Occupational Stress Questionnaire     Feeling of Stress : Not at all   Housing Stability: Low Risk  (6/13/2024)    Housing Stability Vital Sign     Unable to Pay for Housing in the Last Year: No     Homeless in the Last Year: No       Review of Systems    OBJECTIVE:     Vital Signs  Temp: 97.5 °F (36.4 °C)  Pulse: (!) 59  BP: 114/74  Pain Score: 0-No pain  There is no height or weight on file to calculate BMI.    Neurosurgery Physical Exam  General: well developed, well nourished, no distress.   Head:  normocephalic, atraumatic  Neurologic: Alert and oriented. Thought content appropriate.  GCS: Motor: 6/Verbal: 5/Eyes: 4 GCS Total: 15  Mental Status: Awake, Alert, Oriented x 4  Language: No aphasia  Speech: No dysarthria  Cranial nerves: face symmetric, tongue midline, CN II-XII grossly intact.   Eyes: pupils equal, round, reactive to light with accomodation, EOMI.   Pulmonary: normal respirations, no signs of respiratory distress  Abdomen: soft, non-distended  Skin: Skin is warm, dry and intact. Incision well-healed  Sensory: intact to light touch throughout  Motor Strength:Moves all extremities spontaneously with good tone.       Diagnostic Results:  I have personally reviewed the x-ray cervical spine dated 7/25/24 which shows anterior cervical fusion at C5-6 and C6-7 with hardware in stable position.     ASSESSMENT/PLAN:     Leonardo Pisano is a 69 y.o. male s/p ACD C5-6 and C6-7 on 6/12/24 with Dr. Leslie. The patient was seen in clinic today for his 6 week post-op evaluation. States that he is doing well overall since surgery. He has noticed some hoarseness and intermittent difficulties with swallowing. I have placed a referral to ENT to evaluate his vocal cords and swallowing. A speech evaluation was also placed. Lastly, a CT cervical spine has been ordered to obtain in 6 weeks to assess for bony fusion.     I would like the patient to follow-up in clinic with Dr. Leslie for the 3 month post-op evaluation and discuss proceeding with back surgery. I have encouraged him to contact the clinic with any questions, concerns, or adverse clinical changes. He verbalized understanding.      TANI Blevins  Neurosurgery  [facility] [place].      Note dictated with voice recognition software, please excuse any grammatical errors.

## 2024-07-26 ENCOUNTER — PATIENT MESSAGE (OUTPATIENT)
Dept: NEUROSURGERY | Facility: CLINIC | Age: 69
End: 2024-07-26
Payer: MEDICARE

## 2024-08-08 ENCOUNTER — OFFICE VISIT (OUTPATIENT)
Dept: OTOLARYNGOLOGY | Facility: CLINIC | Age: 69
End: 2024-08-08
Payer: MEDICARE

## 2024-08-08 VITALS
SYSTOLIC BLOOD PRESSURE: 144 MMHG | BODY MASS INDEX: 31.08 KG/M2 | WEIGHT: 242.06 LBS | HEART RATE: 56 BPM | DIASTOLIC BLOOD PRESSURE: 84 MMHG

## 2024-08-08 DIAGNOSIS — R49.0 DYSPHONIA: Primary | ICD-10-CM

## 2024-08-08 DIAGNOSIS — Z98.1 S/P CERVICAL SPINAL FUSION: ICD-10-CM

## 2024-08-08 DIAGNOSIS — R13.10 DYSPHAGIA, UNSPECIFIED TYPE: ICD-10-CM

## 2024-08-08 PROCEDURE — 99999 PR PBB SHADOW E&M-EST. PATIENT-LVL III: CPT | Mod: PBBFAC,,, | Performed by: NURSE PRACTITIONER

## 2024-08-27 ENCOUNTER — TELEPHONE (OUTPATIENT)
Dept: HEPATOLOGY | Facility: CLINIC | Age: 69
End: 2024-08-27
Payer: MEDICARE

## 2024-08-27 DIAGNOSIS — R74.8 ABNORMAL LIVER ENZYMES: Primary | ICD-10-CM

## 2024-08-27 NOTE — TELEPHONE ENCOUNTER
Pt was called back and his lab appt was Rs to [facility]     ----- Message from Angelica Amaro sent at 8/27/2024  4:53 PM CDT -----  Regarding: Appt  Contact: Pt  [phone]            Current Appt date: 09/12/24     Type of Appt:  Lab     Physician: Thania Starr NP    Reason for rescheduling: Would like location changed to [facility]      Caller: Leonardo      Contact Preference: [phone]

## 2024-09-13 ENCOUNTER — TELEPHONE (OUTPATIENT)
Dept: HEPATOLOGY | Facility: CLINIC | Age: 69
End: 2024-09-13
Payer: MEDICARE

## 2024-09-16 ENCOUNTER — LAB VISIT (OUTPATIENT)
Dept: LAB | Facility: HOSPITAL | Age: 69
End: 2024-09-16
Attending: NURSE PRACTITIONER
Payer: MEDICARE

## 2024-09-16 DIAGNOSIS — R74.8 ABNORMAL LIVER ENZYMES: ICD-10-CM

## 2024-09-16 LAB
ALBUMIN SERPL BCP-MCNC: 3.7 G/DL (ref 3.5–5.2)
ALP SERPL-CCNC: 126 U/L (ref 55–135)
ALT SERPL W/O P-5'-P-CCNC: 80 U/L (ref 10–44)
AST SERPL-CCNC: 46 U/L (ref 10–40)
BILIRUB DIRECT SERPL-MCNC: 0.2 MG/DL (ref 0.1–0.3)
BILIRUB SERPL-MCNC: 0.5 MG/DL (ref 0.1–1)
PROT SERPL-MCNC: 6.9 G/DL (ref 6–8.4)

## 2024-09-16 PROCEDURE — 36415 COLL VENOUS BLD VENIPUNCTURE: CPT | Performed by: NURSE PRACTITIONER

## 2024-09-16 PROCEDURE — 80076 HEPATIC FUNCTION PANEL: CPT | Performed by: NURSE PRACTITIONER

## 2024-09-17 ENCOUNTER — OFFICE VISIT (OUTPATIENT)
Dept: HEPATOLOGY | Facility: CLINIC | Age: 69
End: 2024-09-17
Payer: MEDICARE

## 2024-09-17 DIAGNOSIS — E66.9 OBESITY (BMI 30-39.9): ICD-10-CM

## 2024-09-17 DIAGNOSIS — E78.2 MIXED HYPERLIPIDEMIA: ICD-10-CM

## 2024-09-17 DIAGNOSIS — R73.03 PRE-DIABETES: ICD-10-CM

## 2024-09-17 DIAGNOSIS — R74.8 ABNORMAL LIVER ENZYMES: ICD-10-CM

## 2024-09-17 DIAGNOSIS — K76.0 METABOLIC DYSFUNCTION-ASSOCIATED STEATOTIC LIVER DISEASE (MASLD): Primary | ICD-10-CM

## 2024-09-17 PROCEDURE — 3044F HG A1C LEVEL LT 7.0%: CPT | Mod: CPTII,95,, | Performed by: NURSE PRACTITIONER

## 2024-09-17 PROCEDURE — 99214 OFFICE O/P EST MOD 30 MIN: CPT | Mod: 95,,, | Performed by: NURSE PRACTITIONER

## 2024-09-17 PROCEDURE — 4010F ACE/ARB THERAPY RXD/TAKEN: CPT | Mod: CPTII,95,, | Performed by: NURSE PRACTITIONER

## 2024-09-17 NOTE — PROGRESS NOTES
[facility] - Established Patient    Last Clinic Visit: 5/30/24    Chief Complaint: Follow-up for fatty liver, elevated liver enzymes       HISTORY     This is a 69 y.o. male with PMH noted below, here for follow-up of above.      Imaging has shown hepatic steatosis. No imaging findings to suggest advanced liver fibrosis.     Risk factors for fatty liver include:   Obesity, BMI >30  HTN, HLD, pre-diabetes  No h/o heavy alcohol use    His transaminases have been intermittently elevated since at least 2004, ALT>AST. ALT max 180s. Liver function and platelet count are normal.    Serologic workup has been negative for Benji's, alpha-1 antitrypsin deficiency, autoimmune etiology, and viral hepatitis.    Ferritin >4K in 2021 though hospitalized at that time with covid. Iron sat WNL. HH DNA negative.    Fibrosis staging:   Fibroscan 5/30/24 = F0-F1, S1     Interval history:  Will be seeing Neurosurgery soon; hoping to gain clearance to be more active. Plans to exercise more once he is given the ok.    No symptoms of hepatic decompensation including jaundice, ascites, cognitive problems to suggest hepatic encephalopathy, or GI bleeding.     Updates on risk factors for fatty liver:  Weight -- BMI 31     Weight is down 6-7 lb from last year  Has decreased carb intake                    Dyslipidemia -- mildly elevated                                Insulin resistance / diabetes -- HgbA1c 6          Alcohol use -- very infrequent       Liver enzymes: improving, ALT 80    Health Maintenance:  - Most recent imaging: abd US 5/2024 and CT 6/2024 without focal hepatic lesion  - Hepatitis A & B vaccination: immunity unknown         Past medical history, surgical history, problem list, family history, social history, allergies: Reviewed and updated in the appropriate section of the electronic medical record.      Current Outpatient Medications   Medication Sig Dispense Refill    lisinopriL (PRINIVIL,ZESTRIL) 5 MG tablet  Take 1 tablet (5 mg total) by mouth once daily. 90 tablet 1     No current facility-administered medications for this visit.     Medication list reviewed and updated.      Review of Systems - as per HPI  Constitutional: Negative for unexpected weight change.   Respiratory: Negative for shortness of breath.    Cardiovascular: Negative for leg swelling.  Gastrointestinal: Negative for abdominal distention or abdominal pain. Negative for melena or hematemesis.  Musculoskeletal: Negative for myalgias.    Skin: Negative for jaundice or itching.  Neurological: Negative for confusion or slowed mentation. Negative for tremors.   Hematological: Does not bruise/bleed easily.       Physical Exam - limited by virtual visit  Constitutional: No distress. Alert and oriented.  Pulmonary/Chest: No respiratory distress.   Skin: No jaundice.   Psychiatric: Normal mood and affect. Speech, behavior, and thought content normal.      LABS & DIAGNOSTIC STUDIES     I have personally reviewed pertinent laboratory findings:    Lab Results   Component Value Date    ALT 80 (H) 09/16/2024    AST 46 (H) 09/16/2024    ALKPHOS 126 09/16/2024    BILITOT 0.5 09/16/2024    ALBUMIN 3.7 09/16/2024    INR 1.0 06/06/2024       Lab Results   Component Value Date    WBC 10.02 06/13/2024    HGB 14.0 06/13/2024    HCT 41.2 06/13/2024    MCV 92 06/13/2024     06/13/2024       Lab Results   Component Value Date     (L) 06/13/2024    K 4.5 06/13/2024    BUN 18 06/13/2024    CREATININE 0.9 06/13/2024    ESTGFRAFRICA >60 03/16/2022    EGFRNONAA >60 03/16/2022       Lab Results   Component Value Date    SMOOTHMUSCAB Pos 1:40 (A) 02/25/2010    AMAIFA Negative 02/25/2010    IGGSERUM 972 02/25/2010    FERRITIN 4,230 (H) 06/02/2021    FESATURATED 33 02/25/2010    JMJOL1UETQHH MS 02/25/2010    SRNDB8SPWIRK 101 02/25/2010    CERULOPLSM 25.6 02/25/2010     (H) 06/02/2021     (H) 06/02/2021    HEPBSAG Negative 02/16/2004    HEPCAB Non-reactive  "05/08/2024       No results found for: "AFP"      I have personally reviewed the following result reports:  Abdominal US - 5/15/24  CT - 6/10/24      ASSESSMENT & PLAN     69 y.o. male with:    1. Metabolic dysfunction-associated steatotic liver disease (MASLD)    2. Abnormal liver enzymes    3. Mixed hyperlipidemia    4. Obesity (BMI 30-39.9)    5. Pre-diabetes        Fibroscan is reassuring and suggests no-mild fibrosis (F0-F1). Liver enzymes have slightly improved and it appears weight is down a few lb.     Recommendations:   Repeat Fibroscan in 1 year  Labs to monitor liver enzymes/LFTs every 6 months, which can include labs with PCP  US surveillance every 2 years  Encouraged ongoing weight loss efforts. Mediterranean diet - diet should be low in carbohydrates, added sugars, and processed foods. Recommend increasing protein and fiber intake.   150 min/week of moderate exercise (aerobic + resistance), as tolerated  Maintain good control of blood pressure, cholesterol, and blood sugar      Orders Placed This Encounter   Procedures    FibroScan Transplant Hepatology(Vibration Controlled Transient Elastography)       F/u in 1 year with Fibroscan.       Thank you for allowing me to participate in the care of Leonardo Starr, FNP-C  Hepatology          The patient location is: [place], [place]  The chief complaint leading to consultation is: F/u for fatty liver    Visit type: audiovisual    Face to Face time with patient: 14 min  30 minutes of total time spent on the encounter, which includes face to face time and non-face to face time preparing to see the patient (eg, review of tests), Obtaining and/or reviewing separately obtained history, Documenting clinical information in the electronic or other health record, Independently interpreting results (not separately reported) and communicating results to the patient/family/caregiver, or Care coordination (not separately reported).     Each patient to " whom he or she provides medical services by telemedicine is:  (1) informed of the relationship between the physician and patient and the respective role of any other health care provider with respect to management of the patient; and (2) notified that he or she may decline to receive medical services by telemedicine and may withdraw from such care at any time.

## 2024-09-19 ENCOUNTER — OFFICE VISIT (OUTPATIENT)
Dept: NEUROSURGERY | Facility: CLINIC | Age: 69
End: 2024-09-19
Payer: MEDICARE

## 2024-09-19 ENCOUNTER — HOSPITAL ENCOUNTER (OUTPATIENT)
Dept: RADIOLOGY | Facility: HOSPITAL | Age: 69
Discharge: HOME | End: 2024-09-19
Attending: NURSE PRACTITIONER
Payer: MEDICARE

## 2024-09-19 VITALS — DIASTOLIC BLOOD PRESSURE: 69 MMHG | HEART RATE: 78 BPM | SYSTOLIC BLOOD PRESSURE: 131 MMHG | TEMPERATURE: 97 F

## 2024-09-19 DIAGNOSIS — M48.061 SPINAL STENOSIS OF LUMBAR REGION WITHOUT NEUROGENIC CLAUDICATION: Primary | ICD-10-CM

## 2024-09-19 DIAGNOSIS — Z98.1 S/P CERVICAL SPINAL FUSION: ICD-10-CM

## 2024-09-19 DIAGNOSIS — M48.02 SPINAL STENOSIS, CERVICAL REGION: ICD-10-CM

## 2024-09-19 PROCEDURE — 4010F ACE/ARB THERAPY RXD/TAKEN: CPT | Mod: CPTII,S$GLB,, | Performed by: NURSE PRACTITIONER

## 2024-09-19 PROCEDURE — 99999 PR PBB SHADOW E&M-EST. PATIENT-LVL III: CPT | Mod: PBBFAC,,, | Performed by: NURSE PRACTITIONER

## 2024-09-19 PROCEDURE — 99024 POSTOP FOLLOW-UP VISIT: CPT | Mod: S$GLB,,, | Performed by: NURSE PRACTITIONER

## 2024-09-19 PROCEDURE — 72125 CT NECK SPINE W/O DYE: CPT | Mod: TC

## 2024-09-19 PROCEDURE — 1160F RVW MEDS BY RX/DR IN RCRD: CPT | Mod: CPTII,S$GLB,, | Performed by: NURSE PRACTITIONER

## 2024-09-19 PROCEDURE — 3288F FALL RISK ASSESSMENT DOCD: CPT | Mod: CPTII,S$GLB,, | Performed by: NURSE PRACTITIONER

## 2024-09-19 PROCEDURE — 3044F HG A1C LEVEL LT 7.0%: CPT | Mod: CPTII,S$GLB,, | Performed by: NURSE PRACTITIONER

## 2024-09-19 PROCEDURE — 3075F SYST BP GE 130 - 139MM HG: CPT | Mod: CPTII,S$GLB,, | Performed by: NURSE PRACTITIONER

## 2024-09-19 PROCEDURE — 72125 CT NECK SPINE W/O DYE: CPT | Mod: 26,,, | Performed by: RADIOLOGY

## 2024-09-19 PROCEDURE — 1101F PT FALLS ASSESS-DOCD LE1/YR: CPT | Mod: CPTII,S$GLB,, | Performed by: NURSE PRACTITIONER

## 2024-09-19 PROCEDURE — 1159F MED LIST DOCD IN RCRD: CPT | Mod: CPTII,S$GLB,, | Performed by: NURSE PRACTITIONER

## 2024-09-19 PROCEDURE — 3078F DIAST BP <80 MM HG: CPT | Mod: CPTII,S$GLB,, | Performed by: NURSE PRACTITIONER

## 2024-09-19 PROCEDURE — 1125F AMNT PAIN NOTED PAIN PRSNT: CPT | Mod: CPTII,S$GLB,, | Performed by: NURSE PRACTITIONER

## 2024-09-19 NOTE — PROGRESS NOTES
Neurosurgery  Established Patient    SUBJECTIVE:     History of Present Illness: Leonardo Pisano is a 69 y.o. male s/p ACD C5-6 and C6-7 on 6/12/24 with Dr. Leslie. The patient is being seen in clinic today for his 3 month post-op evaluation with myself and Dr. Leslie. States that he continues to do well from a neck perspective. The patient was evaluated by ENT and informed that no acute abnormalities were seen on his scope and that his dysphagia and hoarseness should continue to improve. He is eager to talk about options for his chronic low back pain. Denies new leg weakness, b/b dysfunction, saddle anesthesia, or gait instability. Back pain> leg pain. Pain is worse with standing, walking, and laying flat. He is obtaining PT with some relief. He has not obtained injections in a long time.      Review of patient's allergies indicates:   Allergen Reactions    No known allergies        Current Outpatient Medications   Medication Sig Dispense Refill    lisinopriL (PRINIVIL,ZESTRIL) 5 MG tablet Take 1 tablet (5 mg total) by mouth once daily. 90 tablet 1     No current facility-administered medications for this visit.       Past Medical History:   Diagnosis Date    Back pain     Degenerative disc disease     Disorder of kidney and ureter     stone    Essential hypertension 04/03/2018    Hepatitis     Hyperlipidemia     Hypoxia 06/02/2021    Obesity     Pneumonia     Pneumonia due to COVID-19 virus 06/03/2021    Trouble in sleeping     due to pain     Past Surgical History:   Procedure Laterality Date    FUSION, SPINE, CERVICAL, ANTERIOR APPROACH N/A 6/12/2024    Procedure: C5-6,C6-7 ANTERIOR CERVICAL DISCECTOMY AND FUSION;  Surgeon: Umberto Leslie MD;  Location: [facility] OR [address] [place] [address];  Service: Neurosurgery;  Laterality: N/A;    SHOULDER SURGERY  4/25/12    rt shoulder     Family History       Problem Relation (Age of Onset)    Cancer Mother    Diabetes Daughter    Heart disease Father    Hyperlipidemia Sister, Sister           Social History     Socioeconomic History    Marital status:     Number of children: 2   Occupational History     Employer: [employer]   Tobacco Use    Smoking status: Never    Smokeless tobacco: Never   Substance and Sexual Activity    Alcohol use: No    Drug use: Never     Social Determinants of Health     Financial Resource Strain: Low Risk  (8/7/2024)    Overall Financial Resource Strain (CARDIA)     Difficulty of Paying Living Expenses: Not hard at all   Food Insecurity: No Food Insecurity (8/7/2024)    Hunger Vital Sign     Worried About Running Out of Food in the Last Year: Never true     Ran Out of Food in the Last Year: Never true   Transportation Needs: No Transportation Needs (6/13/2024)    TRANSPORTATION NEEDS     Transportation : No   Physical Activity: Sufficiently Active (8/7/2024)    Exercise Vital Sign     Days of Exercise per Week: 4 days     Minutes of Exercise per Session: 40 min   Recent Concern: Physical Activity - Insufficiently Active (6/13/2024)    Exercise Vital Sign     Days of Exercise per Week: 7 days     Minutes of Exercise per Session: 20 min   Stress: No Stress Concern Present (8/7/2024)    [facility] [place] [facility] [employer] - Occupational Stress Questionnaire     Feeling of Stress : Not at all   Housing Stability: Low Risk  (8/7/2024)    Housing Stability Vital Sign     Unable to Pay for Housing in the Last Year: No     Homeless in the Last Year: No       Review of Systems    OBJECTIVE:     Vital Signs  Temp: 97.1 °F (36.2 °C)  Pulse: 78  BP: 131/69  Pain Score:   3  There is no height or weight on file to calculate BMI.    Neurosurgery Physical Exam  General: well developed, well nourished, no distress.   Head: normocephalic, atraumatic  Neurologic: Alert and oriented. Thought content appropriate.  GCS: Motor: 6/Verbal: 5/Eyes: 4 GCS Total: 15  Mental Status: Awake, Alert, Oriented x 4  Language: No aphasia  Speech: No dysarthria  Cranial nerves: face symmetric,  tongue midline, CN II-XII grossly intact.   Eyes: pupils equal, round, reactive to light with accomodation, EOMI.   Pulmonary: normal respirations, no signs of respiratory distress  Abdomen: soft, non-distended  Skin: Skin is warm, dry and intact.  Sensory: intact to light touch throughout  Motor Strength:Moves all extremities spontaneously with good tone.       Diagnostic Results:  I have personally reviewed the CT cervical spine dated 9/19/24 with Dr. Leslie shows C5-C6 and C6-C7 ACDF with hardware in stable position and evidence of bony fusion.      ASSESSMENT/PLAN:     Leonardo Pisano is a 69 y.o. male s/p ACD C5-6 and C6-7 on 6/12/24 with Dr. Leslie. The patient was seen in clinic today for his 3 month post-op evaluation with myself and Dr. Leslie. The patient is interested in discussing options for his chronic low back pain. Dr. Leslie recommends injections with pain management RAFA L5-S1 and/or Pars injection.   I would like the patient to follow-up in clinic with Dr. Leslie in 2-3 months following the injections to discuss progress. I have encouraged him to contact the clinic with any questions, concerns, or adverse clinical changes. He verbalized understanding.      GOKUL Blevins-THI  Neurosurgery  [facility].      Note dictated with voice recognition software, please excuse any grammatical errors.

## 2024-09-20 ENCOUNTER — OFFICE VISIT (OUTPATIENT)
Dept: PAIN MEDICINE | Facility: CLINIC | Age: 69
End: 2024-09-20
Payer: MEDICARE

## 2024-09-20 ENCOUNTER — PATIENT MESSAGE (OUTPATIENT)
Dept: PAIN MEDICINE | Facility: OTHER | Age: 69
End: 2024-09-20
Payer: MEDICARE

## 2024-09-20 VITALS
DIASTOLIC BLOOD PRESSURE: 72 MMHG | OXYGEN SATURATION: 100 % | BODY MASS INDEX: 30.82 KG/M2 | SYSTOLIC BLOOD PRESSURE: 120 MMHG | RESPIRATION RATE: 18 BRPM | HEART RATE: 60 BPM | TEMPERATURE: 98 F | WEIGHT: 240.06 LBS

## 2024-09-20 DIAGNOSIS — M51.36 DDD (DEGENERATIVE DISC DISEASE), LUMBAR: ICD-10-CM

## 2024-09-20 DIAGNOSIS — M54.17 LUMBOSACRAL RADICULOPATHY: Primary | ICD-10-CM

## 2024-09-20 DIAGNOSIS — M48.061 SPINAL STENOSIS OF LUMBAR REGION WITHOUT NEUROGENIC CLAUDICATION: ICD-10-CM

## 2024-09-20 DIAGNOSIS — M47.816 LUMBAR SPONDYLOSIS: ICD-10-CM

## 2024-09-20 PROCEDURE — 99999 PR PBB SHADOW E&M-EST. PATIENT-LVL III: CPT | Mod: PBBFAC,,, | Performed by: ANESTHESIOLOGY

## 2024-09-20 NOTE — PROGRESS NOTES
PCP: Saud Coles MD    REFERRING PHYSICIAN: Divine Varela NP (Neurosurgery)    CHIEF COMPLAINT: low back pain    Original HISTORY OF PRESENT ILLNESS: Leonardo Pisano presents to the clinic for the evaluation of the above pain. The pain started 20+ years ago    Original Pain Description:  The pain is located in the low back and is axial in nature.  He does endorsing radiating pain to bilateral posterior thigh and stops at the knee  The pain is described as sharp and tight band.   Exacerbating factors: Sitting, Standing, Laying, Morning, and Getting out of bed/chair.   Mitigating factors physical therapy and rest.   Symptoms interfere with daily activity and sleeping.   The patient feels like symptoms have been worsening.  Patient reports no distance restriction with walking  Patient denies night fever/night sweats, urinary incontinence, and bowel incontinence.  Occupation: retired        Original PAIN SCORES:  Best: Pain is 1  Worst: Pain is 10  Current: Pain is 2        9/20/2024    10:33 AM   Last 3 PDI Scores   Pain Disability Index (PDI) 14       INTERVAL HISTORY: (Newest visit at the bottom)   Interval History (Date):       6 weeks of Conservative therapy:  PT: completed PT (9/2024)  Chiro: none  HEP: daily exercise    Relevant Surgical Hx  6/12/2024 - ACD C5-6 and C6-7 ([facility])      Treatments / Medications: (Ice/Heat/NSAIDS/APAP/etc):  Tylenol      Blood thinners: none      Interventional Pain Procedures: (Previous injections)  Pain intervention 20 years ago that was effective for 1 year        Past Medical History:   Diagnosis Date    Back pain     Degenerative disc disease     Disorder of kidney and ureter     stone    Essential hypertension 04/03/2018    Hepatitis     Hyperlipidemia     Hypoxia 06/02/2021    Obesity     Pneumonia     Pneumonia due to COVID-19 virus 06/03/2021    Trouble in sleeping     due to pain     Past Surgical History:   Procedure Laterality Date    FUSION, SPINE,  CERVICAL, ANTERIOR APPROACH N/A 6/12/2024    Procedure: C5-6,C6-7 ANTERIOR CERVICAL DISCECTOMY AND FUSION;  Surgeon: Umberto Leslie MD;  Location: [facility] OR [address] [place] [address];  Service: Neurosurgery;  Laterality: N/A;    SHOULDER SURGERY  4/25/12    rt shoulder     Social History     Socioeconomic History    Marital status:     Number of children: 2   Occupational History     Employer: [employer]   Tobacco Use    Smoking status: Never    Smokeless tobacco: Never   Substance and Sexual Activity    Alcohol use: No    Drug use: Never     Social Determinants of Health     Financial Resource Strain: Low Risk  (8/7/2024)    Overall Financial Resource Strain (CARDIA)     Difficulty of Paying Living Expenses: Not hard at all   Food Insecurity: No Food Insecurity (8/7/2024)    Hunger Vital Sign     Worried About Running Out of Food in the Last Year: Never true     Ran Out of Food in the Last Year: Never true   Transportation Needs: No Transportation Needs (6/13/2024)    TRANSPORTATION NEEDS     Transportation : No   Physical Activity: Sufficiently Active (8/7/2024)    Exercise Vital Sign     Days of Exercise per Week: 4 days     Minutes of Exercise per Session: 40 min   Recent Concern: Physical Activity - Insufficiently Active (6/13/2024)    Exercise Vital Sign     Days of Exercise per Week: 7 days     Minutes of Exercise per Session: 20 min   Stress: No Stress Concern Present (8/7/2024)    [employer] [place] [employer] - Occupational Stress Questionnaire     Feeling of Stress : Not at all   Housing Stability: Low Risk  (8/7/2024)    Housing Stability Vital Sign     Unable to Pay for Housing in the Last Year: No     Homeless in the Last Year: No     Family History   Problem Relation Name Age of Onset    Cancer Mother          liver    Heart disease Father          cabg    Hyperlipidemia Sister      Hyperlipidemia Sister      Diabetes Daughter      Cirrhosis Neg Hx         Review of patient's allergies indicates:    Allergen Reactions    No known allergies        Current Outpatient Medications   Medication Sig    lisinopriL (PRINIVIL,ZESTRIL) 5 MG tablet Take 1 tablet (5 mg total) by mouth once daily.     No current facility-administered medications for this visit.       ROS:  GENERAL: No fever. No chills. No fatigue. Denies weight loss. Denies weight gain.  HEENT: Denies headaches. Denies vision change. Denies eye pain. Denies double vision. Denies ear pain.   CV: Denies chest pain.   PULM: Denies of shortness of breath.  GI: Denies constipation. No diarrhea. No abdominal pain. Denies nausea. Denies vomiting. No blood in stool.  HEME: Denies bleeding problems.  : Denies urgency. No painful urination. No blood in urine.  MS: Denies joint stiffness. Denies joint swelling.  + back pain.  SKIN: Denies rash.   NEURO: Denies seizures. No weakness.  PSYCH:  Denies difficulty sleeping. No anxiety. Denies depression. No suicidal thoughts.       VITALS:   Vitals:    09/20/24 1029   BP: 120/72   Pulse: 60   Resp: 18   Temp: 98.2 °F (36.8 °C)   SpO2: 100%   Weight: 108.9 kg (240 lb 1.3 oz)   PainSc:   2   PainLoc: Back         PHYSICAL EXAM:   GENERAL: Well appearing, in no acute distress, alert and oriented x3.  PSYCH:  Mood and affect appropriate.  SKIN: Skin color, texture, turgor normal, no rashes or lesions.  HEENT:  Normocephalic, atraumatic. Cranial nerves grossly intact.  PULM: No evidence of respiratory difficulty, symmetric chest rise.  GI:  Non-distended  BACK: Normal range of motion.    Negative Bilaterally pain to palpation over the spinous processes.   Negative Bilaterally pain to palpation over facet joints.   POSITIVE on LEFT axial loading test bilateral.   Negative tenderness over BILATERAL SIJ.    Negative thigh and sacral thrust test.    Negative Bilaterally FABERE  Milgram's mildy positive    EXTREMITIES: No deformities, edema, or skin discoloration.  No atrophy is noted   Negative Bilaterally FADIR on the side.    NEURO: Sensation is equal and appropriate bilaterally. Bilateral upper and lower extremity strength is normal and symmetric. Bilateral upper and lower extremity coordination and muscle stretch reflexes are physiologic and symmetric. Plantar response are downgoing.   Straight leg raising in the supine position is Negative Bilaterally to radicular pain.   GAIT: Normal, ambulates with no assistive devices        LABS:      IMAGING:    LUMBAR X-RAY  Results for orders placed during the hospital encounter of 04/09/24    X-Ray Lumbar Complete Including Flex And Ext    Narrative  EXAMINATION:  XR LUMBAR SPINE 5 VIEW WITH FLEX AND EXT    CLINICAL HISTORY:  Scoliosis, unspecified    TECHNIQUE:  Five views of the lumbar spine plus flexion extension views were performed.    COMPARISON:  04/26/2019    FINDINGS:  There is slight lumbar levocurvature.  There is grade 1 anterolisthesis of L5 on S1 accompanied by bilateral L5 pars defects, with anterolisthesis mildly increased.  Mild multilevel degenerative changes are present.  No abnormal motion on flexion or extension is demonstrated.  There is moderate L4-5 and L5-S1 facet arthropathy.    Impression  Grade 1 L5-S1 spondylolisthesis, mildly increased.      Electronically signed by: Brendan Smith MD  Date:    04/09/2024  Time:    15:49     LUMBAR MRI  Results for orders placed during the hospital encounter of 04/24/24    MRI Lumbar Spine Without Contrast    Narrative  EXAMINATION:  MRI LUMBAR SPINE WITHOUT CONTRAST    CLINICAL HISTORY:  Scoliosis; Scoliosis, unspecified    TECHNIQUE:  Multiplanar, multi-sequence MR images were acquired from the thoracolumbar junction to the sacrum without the administration of contrast.    COMPARISON:  MRI cervical and thoracic spine-04/24/2024    FINDINGS:  There is transitional anatomy present at the lumbosacral junction.  The transitional segment will be labeled S1 with a fully formed intervertebral discs noted between S1 and S2.  There  is a grade I anterolisthesis of S1 on S2 as a result of bilateral S1 pars defects.  There is a grade I retrolisthesis of L4 on L5 with associated uncovering of the intervertebral disc.  No acute lumbar compression fracture or pathologic marrow replacement process.  There is degenerative disc desiccation at L2-L3 through S1-S2.    The conus medullaris terminates at L1.  The visualized lower thoracic spinal cord is normal in signal intensity with no evidence of cord edema, myelomalacia, or cord syrinx.  No epidural fluid collections or masses.  The paraspinous musculature is unremarkable.    There are multiple foci of T2 signal hyperintensity within the left kidney in the parapelvic region in the cortex at the upper pole of the left kidney, most likely cysts.    T12-L1: There is a broad central/right paracentral disc protrusion which effaces the anterior CSF sleeve.  There is moderate effacement of the thecal sac.  No neuroforaminal stenosis.    L1-L2: No disc protrusion or extrusion. No central canal stenosis or neuroforaminal stenosis.    L2-L3: There is a broad disc bulge which effaces the anterior CSF sleeve.  No disc protrusion or extrusion. No central canal stenosis or neuroforaminal stenosis.    L3-L4: There is a broad disc bulge which effaces the anterior CSF sleeve and extends into both neural foramina.  There is an annular fissure present within the central portion of the intervertebral disc.  There is, at most, mild right neuroforaminal stenosis.  No left neuroforaminal stenosis.    L4-L5: There is a grade I retrolisthesis of L4 on L5 with associated uncovering of the intervertebral disc.  There is a broad disc bulge which extends into both neural foramina.  There is ligamentum flavum thickening and bilateral hypertrophic facet arthropathy, right greater than left.  There is moderate overall central canal stenosis and moderate bilateral neuroforaminal stenosis.    L5-S1: There is a broad disc bulge which  extends into both neural foramina.  There is bilateral hypertrophic degenerative facet arthropathy and ligamentum flavum thickening.  There is severe central canal stenosis, moderate left neuroforaminal stenosis, and moderate-severe right neuroforaminal stenosis.  Please correlate clinically for symptoms referable to the L5 nerves.    S1-S2: There is a grade I anterolisthesis of S1 on S2 with associated uncovering of the intervertebral disc.  There is a superimposed broad disc bulge which extends into both neural foramina and there is bilateral hypertrophic facet arthropathy and ligamentum flavum thickening.  There is prominent epidural fat deposition observed in the spinal canal at S1-S2.  There is severe effacement of the thecal sac as a result of epidural fat deposition in the aforementioned degenerative changes.  There is severe bilateral neuroforaminal stenosis, left greater than right, which could produce symptoms referable to the S1 nerves.    Impression  1. Transitional anatomy at the lumbosacral junction.  Comparison was made to the cervical and thoracic MRI of today's date to determine correct nomenclature in the lumbar spine.  Please note that this represents differing nomenclature than was described at the time of the patient's MRI performed 05/03/2019.  2. Advanced multilevel degenerative change of the lumbosacral spine resulting in multilevel central canal and neuroforaminal stenosis as detailed above.  3. Grade I anterolisthesis of S1 on S2 and grade I retrolisthesis of L4 on L5  4. Probable left renal cysts  5. Other findings as detailed above.      Electronically signed by: Bryan Ho MD  Date:    04/24/2024  Time:    11:26         ASSESSMENT: 69 y.o. year old male with pain, consistent with:    Encounter Diagnoses   Name Primary?    Spinal stenosis of lumbar region without neurogenic claudication     Lumbosacral radiculopathy Yes    DDD (degenerative disc disease), lumbar     Lumbar spondylosis         DISCUSSION: Leonardo Pisano is a retired gentleman who comes to us from Dr. Leslie with 20+ years of low back pain which is worst with prolonged sitting, standing, laying down. He denies walking limitations. Imaging shows bilateral S1 pars defects, multi-level DDD with endplate changes at L4/5 and L5/S1 and mod-severe canal stenosis at L5/S1. Exam shows pain reproduced with facet loading, milgrams. They would like to avoid extensive surgery.         PLAN:  Reviewed MRI with Mr. Pisano  Discussed minimally invasive treatment options.   Continue HEP  Continue OTC for pain control.   Schedule caudal RAFA  Follow up after procedure  Consider Intracept    I would like to thank Divine Varela, NP for the opportunity to assist in the care of this patient. We had a very nice visit and I look forward to continuing their care. Please let me know if I can be of further assistance.     The above plan and management options were discussed at length with patient. Patient is in agreement with the above and verbalized understanding. It will be communicated with the referring physician via electronic record, fax, or mail.      Carlee Koch MD  09/20/2024

## 2024-09-20 NOTE — H&P (VIEW-ONLY)
PCP: Saud Coles MD    REFERRING PHYSICIAN: Divine Varela NP (Neurosurgery)    CHIEF COMPLAINT: low back pain    Original HISTORY OF PRESENT ILLNESS: Leonardo Pisano presents to the clinic for the evaluation of the above pain. The pain started 20+ years ago    Original Pain Description:  The pain is located in the low back and is axial in nature.  He does endorsing radiating pain to bilateral posterior thigh and stops at the knee  The pain is described as sharp and tight band.   Exacerbating factors: Sitting, Standing, Laying, Morning, and Getting out of bed/chair.   Mitigating factors physical therapy and rest.   Symptoms interfere with daily activity and sleeping.   The patient feels like symptoms have been worsening.  Patient reports no distance restriction with walking  Patient denies night fever/night sweats, urinary incontinence, and bowel incontinence.  Occupation: retired        Original PAIN SCORES:  Best: Pain is 1  Worst: Pain is 10  Current: Pain is 2        9/20/2024    10:33 AM   Last 3 PDI Scores   Pain Disability Index (PDI) 14       INTERVAL HISTORY: (Newest visit at the bottom)   Interval History (Date):       6 weeks of Conservative therapy:  PT: completed PT (9/2024)  Chiro: none  HEP: daily exercise    Relevant Surgical Hx  6/12/2024 - ACD C5-6 and C6-7 ([facility])      Treatments / Medications: (Ice/Heat/NSAIDS/APAP/etc):  Tylenol      Blood thinners: none      Interventional Pain Procedures: (Previous injections)  Pain intervention 20 years ago that was effective for 1 year        Past Medical History:   Diagnosis Date    Back pain     Degenerative disc disease     Disorder of kidney and ureter     stone    Essential hypertension 04/03/2018    Hepatitis     Hyperlipidemia     Hypoxia 06/02/2021    Obesity     Pneumonia     Pneumonia due to COVID-19 virus 06/03/2021    Trouble in sleeping     due to pain     Past Surgical History:   Procedure Laterality Date    FUSION, SPINE,  CERVICAL, ANTERIOR APPROACH N/A 6/12/2024    Procedure: C5-6,C6-7 ANTERIOR CERVICAL DISCECTOMY AND FUSION;  Surgeon: Umberto Leslie MD;  Location: [facility] OR [address] [place] [address];  Service: Neurosurgery;  Laterality: N/A;    SHOULDER SURGERY  4/25/12    rt shoulder     Social History     Socioeconomic History    Marital status:     Number of children: 2   Occupational History     Employer: [employer]   Tobacco Use    Smoking status: Never    Smokeless tobacco: Never   Substance and Sexual Activity    Alcohol use: No    Drug use: Never     Social Determinants of Health     Financial Resource Strain: Low Risk  (8/7/2024)    Overall Financial Resource Strain (CARDIA)     Difficulty of Paying Living Expenses: Not hard at all   Food Insecurity: No Food Insecurity (8/7/2024)    Hunger Vital Sign     Worried About Running Out of Food in the Last Year: Never true     Ran Out of Food in the Last Year: Never true   Transportation Needs: No Transportation Needs (6/13/2024)    TRANSPORTATION NEEDS     Transportation : No   Physical Activity: Sufficiently Active (8/7/2024)    Exercise Vital Sign     Days of Exercise per Week: 4 days     Minutes of Exercise per Session: 40 min   Recent Concern: Physical Activity - Insufficiently Active (6/13/2024)    Exercise Vital Sign     Days of Exercise per Week: 7 days     Minutes of Exercise per Session: 20 min   Stress: No Stress Concern Present (8/7/2024)    [employer] [place] [employer] - Occupational Stress Questionnaire     Feeling of Stress : Not at all   Housing Stability: Low Risk  (8/7/2024)    Housing Stability Vital Sign     Unable to Pay for Housing in the Last Year: No     Homeless in the Last Year: No     Family History   Problem Relation Name Age of Onset    Cancer Mother          liver    Heart disease Father          cabg    Hyperlipidemia Sister      Hyperlipidemia Sister      Diabetes Daughter      Cirrhosis Neg Hx         Review of patient's allergies indicates:    Allergen Reactions    No known allergies        Current Outpatient Medications   Medication Sig    lisinopriL (PRINIVIL,ZESTRIL) 5 MG tablet Take 1 tablet (5 mg total) by mouth once daily.     No current facility-administered medications for this visit.       ROS:  GENERAL: No fever. No chills. No fatigue. Denies weight loss. Denies weight gain.  HEENT: Denies headaches. Denies vision change. Denies eye pain. Denies double vision. Denies ear pain.   CV: Denies chest pain.   PULM: Denies of shortness of breath.  GI: Denies constipation. No diarrhea. No abdominal pain. Denies nausea. Denies vomiting. No blood in stool.  HEME: Denies bleeding problems.  : Denies urgency. No painful urination. No blood in urine.  MS: Denies joint stiffness. Denies joint swelling.  + back pain.  SKIN: Denies rash.   NEURO: Denies seizures. No weakness.  PSYCH:  Denies difficulty sleeping. No anxiety. Denies depression. No suicidal thoughts.       VITALS:   Vitals:    09/20/24 1029   BP: 120/72   Pulse: 60   Resp: 18   Temp: 98.2 °F (36.8 °C)   SpO2: 100%   Weight: 108.9 kg (240 lb 1.3 oz)   PainSc:   2   PainLoc: Back         PHYSICAL EXAM:   GENERAL: Well appearing, in no acute distress, alert and oriented x3.  PSYCH:  Mood and affect appropriate.  SKIN: Skin color, texture, turgor normal, no rashes or lesions.  HEENT:  Normocephalic, atraumatic. Cranial nerves grossly intact.  PULM: No evidence of respiratory difficulty, symmetric chest rise.  GI:  Non-distended  BACK: Normal range of motion.    Negative Bilaterally pain to palpation over the spinous processes.   Negative Bilaterally pain to palpation over facet joints.   POSITIVE on LEFT axial loading test bilateral.   Negative tenderness over BILATERAL SIJ.    Negative thigh and sacral thrust test.    Negative Bilaterally FABERE  Milgram's mildy positive    EXTREMITIES: No deformities, edema, or skin discoloration.  No atrophy is noted   Negative Bilaterally FADIR on the side.    NEURO: Sensation is equal and appropriate bilaterally. Bilateral upper and lower extremity strength is normal and symmetric. Bilateral upper and lower extremity coordination and muscle stretch reflexes are physiologic and symmetric. Plantar response are downgoing.   Straight leg raising in the supine position is Negative Bilaterally to radicular pain.   GAIT: Normal, ambulates with no assistive devices        LABS:      IMAGING:    LUMBAR X-RAY  Results for orders placed during the hospital encounter of 04/09/24    X-Ray Lumbar Complete Including Flex And Ext    Narrative  EXAMINATION:  XR LUMBAR SPINE 5 VIEW WITH FLEX AND EXT    CLINICAL HISTORY:  Scoliosis, unspecified    TECHNIQUE:  Five views of the lumbar spine plus flexion extension views were performed.    COMPARISON:  04/26/2019    FINDINGS:  There is slight lumbar levocurvature.  There is grade 1 anterolisthesis of L5 on S1 accompanied by bilateral L5 pars defects, with anterolisthesis mildly increased.  Mild multilevel degenerative changes are present.  No abnormal motion on flexion or extension is demonstrated.  There is moderate L4-5 and L5-S1 facet arthropathy.    Impression  Grade 1 L5-S1 spondylolisthesis, mildly increased.      Electronically signed by: Brendan Smith MD  Date:    04/09/2024  Time:    15:49     LUMBAR MRI  Results for orders placed during the hospital encounter of 04/24/24    MRI Lumbar Spine Without Contrast    Narrative  EXAMINATION:  MRI LUMBAR SPINE WITHOUT CONTRAST    CLINICAL HISTORY:  Scoliosis; Scoliosis, unspecified    TECHNIQUE:  Multiplanar, multi-sequence MR images were acquired from the thoracolumbar junction to the sacrum without the administration of contrast.    COMPARISON:  MRI cervical and thoracic spine-04/24/2024    FINDINGS:  There is transitional anatomy present at the lumbosacral junction.  The transitional segment will be labeled S1 with a fully formed intervertebral discs noted between S1 and S2.  There  is a grade I anterolisthesis of S1 on S2 as a result of bilateral S1 pars defects.  There is a grade I retrolisthesis of L4 on L5 with associated uncovering of the intervertebral disc.  No acute lumbar compression fracture or pathologic marrow replacement process.  There is degenerative disc desiccation at L2-L3 through S1-S2.    The conus medullaris terminates at L1.  The visualized lower thoracic spinal cord is normal in signal intensity with no evidence of cord edema, myelomalacia, or cord syrinx.  No epidural fluid collections or masses.  The paraspinous musculature is unremarkable.    There are multiple foci of T2 signal hyperintensity within the left kidney in the parapelvic region in the cortex at the upper pole of the left kidney, most likely cysts.    T12-L1: There is a broad central/right paracentral disc protrusion which effaces the anterior CSF sleeve.  There is moderate effacement of the thecal sac.  No neuroforaminal stenosis.    L1-L2: No disc protrusion or extrusion. No central canal stenosis or neuroforaminal stenosis.    L2-L3: There is a broad disc bulge which effaces the anterior CSF sleeve.  No disc protrusion or extrusion. No central canal stenosis or neuroforaminal stenosis.    L3-L4: There is a broad disc bulge which effaces the anterior CSF sleeve and extends into both neural foramina.  There is an annular fissure present within the central portion of the intervertebral disc.  There is, at most, mild right neuroforaminal stenosis.  No left neuroforaminal stenosis.    L4-L5: There is a grade I retrolisthesis of L4 on L5 with associated uncovering of the intervertebral disc.  There is a broad disc bulge which extends into both neural foramina.  There is ligamentum flavum thickening and bilateral hypertrophic facet arthropathy, right greater than left.  There is moderate overall central canal stenosis and moderate bilateral neuroforaminal stenosis.    L5-S1: There is a broad disc bulge which  extends into both neural foramina.  There is bilateral hypertrophic degenerative facet arthropathy and ligamentum flavum thickening.  There is severe central canal stenosis, moderate left neuroforaminal stenosis, and moderate-severe right neuroforaminal stenosis.  Please correlate clinically for symptoms referable to the L5 nerves.    S1-S2: There is a grade I anterolisthesis of S1 on S2 with associated uncovering of the intervertebral disc.  There is a superimposed broad disc bulge which extends into both neural foramina and there is bilateral hypertrophic facet arthropathy and ligamentum flavum thickening.  There is prominent epidural fat deposition observed in the spinal canal at S1-S2.  There is severe effacement of the thecal sac as a result of epidural fat deposition in the aforementioned degenerative changes.  There is severe bilateral neuroforaminal stenosis, left greater than right, which could produce symptoms referable to the S1 nerves.    Impression  1. Transitional anatomy at the lumbosacral junction.  Comparison was made to the cervical and thoracic MRI of today's date to determine correct nomenclature in the lumbar spine.  Please note that this represents differing nomenclature than was described at the time of the patient's MRI performed 05/03/2019.  2. Advanced multilevel degenerative change of the lumbosacral spine resulting in multilevel central canal and neuroforaminal stenosis as detailed above.  3. Grade I anterolisthesis of S1 on S2 and grade I retrolisthesis of L4 on L5  4. Probable left renal cysts  5. Other findings as detailed above.      Electronically signed by: Bryan Ho MD  Date:    04/24/2024  Time:    11:26         ASSESSMENT: 69 y.o. year old male with pain, consistent with:    Encounter Diagnoses   Name Primary?    Spinal stenosis of lumbar region without neurogenic claudication     Lumbosacral radiculopathy Yes    DDD (degenerative disc disease), lumbar     Lumbar spondylosis         DISCUSSION: Leonardo Pisano is a retired gentleman who comes to us from Dr. Leslie with 20+ years of low back pain which is worst with prolonged sitting, standing, laying down. He denies walking limitations. Imaging shows bilateral S1 pars defects, multi-level DDD with endplate changes at L4/5 and L5/S1 and mod-severe canal stenosis at L5/S1. Exam shows pain reproduced with facet loading, milgrams. They would like to avoid extensive surgery.         PLAN:  Reviewed MRI with Mr. Pisano  Discussed minimally invasive treatment options.   Continue HEP  Continue OTC for pain control.   Schedule caudal RAFA  Follow up after procedure  Consider Intracept    I would like to thank Divine Varela, NP for the opportunity to assist in the care of this patient. We had a very nice visit and I look forward to continuing their care. Please let me know if I can be of further assistance.     The above plan and management options were discussed at length with patient. Patient is in agreement with the above and verbalized understanding. It will be communicated with the referring physician via electronic record, fax, or mail.      Carlee Koch MD  09/20/2024

## 2024-10-03 ENCOUNTER — PATIENT MESSAGE (OUTPATIENT)
Dept: ADMINISTRATIVE | Facility: OTHER | Age: 69
End: 2024-10-03
Payer: MEDICARE

## 2024-10-08 ENCOUNTER — HOSPITAL ENCOUNTER (OUTPATIENT)
Facility: OTHER | Age: 69
Discharge: HOME | End: 2024-10-08
Attending: ANESTHESIOLOGY | Admitting: ANESTHESIOLOGY
Payer: MEDICARE

## 2024-10-08 VITALS
TEMPERATURE: 98 F | WEIGHT: 240 LBS | OXYGEN SATURATION: 99 % | SYSTOLIC BLOOD PRESSURE: 171 MMHG | RESPIRATION RATE: 16 BRPM | HEART RATE: 52 BPM | HEIGHT: 74 IN | DIASTOLIC BLOOD PRESSURE: 73 MMHG | BODY MASS INDEX: 30.8 KG/M2

## 2024-10-08 DIAGNOSIS — M48.061 SPINAL STENOSIS OF LUMBAR REGION WITHOUT NEUROGENIC CLAUDICATION: Primary | ICD-10-CM

## 2024-10-08 DIAGNOSIS — G89.29 CHRONIC PAIN: ICD-10-CM

## 2024-10-08 PROCEDURE — 99152 MOD SED SAME PHYS/QHP 5/>YRS: CPT | Mod: ,,, | Performed by: ANESTHESIOLOGY

## 2024-10-08 PROCEDURE — 25500020 PHARM REV CODE 255: Performed by: ANESTHESIOLOGY

## 2024-10-08 PROCEDURE — 62323 NJX INTERLAMINAR LMBR/SAC: CPT | Mod: ,,, | Performed by: ANESTHESIOLOGY

## 2024-10-08 PROCEDURE — 63600175 PHARM REV CODE 636 W HCPCS: Performed by: ANESTHESIOLOGY

## 2024-10-08 PROCEDURE — 62323 NJX INTERLAMINAR LMBR/SAC: CPT | Performed by: ANESTHESIOLOGY

## 2024-10-08 PROCEDURE — 99152 MOD SED SAME PHYS/QHP 5/>YRS: CPT | Performed by: ANESTHESIOLOGY

## 2024-10-08 RX ORDER — LIDOCAINE HYDROCHLORIDE 20 MG/ML
INJECTION, SOLUTION INFILTRATION; PERINEURAL
Status: DISCONTINUED | OUTPATIENT
Start: 2024-10-08 | End: 2024-10-08 | Stop reason: HOSPADM

## 2024-10-08 RX ORDER — MIDAZOLAM HYDROCHLORIDE 1 MG/ML
INJECTION INTRAMUSCULAR; INTRAVENOUS
Status: DISCONTINUED | OUTPATIENT
Start: 2024-10-08 | End: 2024-10-08 | Stop reason: HOSPADM

## 2024-10-08 RX ORDER — FENTANYL CITRATE 50 UG/ML
INJECTION, SOLUTION INTRAMUSCULAR; INTRAVENOUS
Status: DISCONTINUED | OUTPATIENT
Start: 2024-10-08 | End: 2024-10-08 | Stop reason: HOSPADM

## 2024-10-08 RX ORDER — DEXAMETHASONE SODIUM PHOSPHATE 10 MG/ML
INJECTION INTRAMUSCULAR; INTRAVENOUS
Status: DISCONTINUED | OUTPATIENT
Start: 2024-10-08 | End: 2024-10-08 | Stop reason: HOSPADM

## 2024-10-08 RX ORDER — LIDOCAINE HYDROCHLORIDE 10 MG/ML
INJECTION, SOLUTION EPIDURAL; INFILTRATION; INTRACAUDAL; PERINEURAL
Status: DISCONTINUED | OUTPATIENT
Start: 2024-10-08 | End: 2024-10-08 | Stop reason: HOSPADM

## 2024-10-08 RX ORDER — SODIUM CHLORIDE 9 MG/ML
INJECTION, SOLUTION INTRAVENOUS CONTINUOUS
Status: DISCONTINUED | OUTPATIENT
Start: 2024-10-08 | End: 2024-10-08 | Stop reason: HOSPADM

## 2024-10-08 NOTE — OP NOTE
Caudal Epidural Steroid Injection without Catheter under Fluoroscopic Guidance    The procedure, risks, benefits, and options were discussed with the patient. There are no contraindications to the procedure. The patent expressed understanding and agreed to the procedure. Informed written consent was obtained prior to the start of the procedure and can be found in the patient's chart.    PATIENT NAME: Leonardo Pisano   MRN: [id number]     DATE OF PROCEDURE: 10/08/2024    PROCEDURE: Caudal Epidural Steroid Injection under Fluoroscopic Guidance    PRE-OP DIAGNOSIS: Lumbosacral radiculopathy [M54.17] Lumbar radiculopathy [M54.16]    POST-OP DIAGNOSIS: Same    PHYSICIAN: Carlee Koch MD    ASSISTANTS:     MEDICATIONS INJECTED: Preservative-free Decadron 10mg with 4cc of Lidocaine 1% MPF     LOCAL ANESTHETIC INJECTED: Xylocaine 2%     SEDATION: Versed 1mg and Fentanyl 50mcg                                                                                                                                                                                     Conscious sedation ordered by M.D. Patient re-evaluation prior to administration of conscious sedation. No changes noted in patient's status from initial evaluation. The patient's vital signs were monitored by RN and patient remained hemodynamically stable throughout the procedure.    Event Time In   Sedation Start 1406   Sedation End 1422       ESTIMATED BLOOD LOSS: None    COMPLICATIONS: None    TECHNIQUE: Time-out was performed to identify the patient and procedure to be performed. With the patient laying in a prone position, the surgical area was prepped and draped in the usual sterile fashion using ChloraPrep and a fenestrated drape. The injection site was determined under fluoroscopy guidance. Skin anesthesia was achieved by injecting Lidocaine 2% over the injection site. The sacrum and sacral cornua were then approached with a 16 gauge, 3.5 inch epidural needle that was  introduced and advanced into the sacral hiatus under fluoroscopic guidance with AP, lateral and/or contralateral oblique imaging. After the needle passed through the sacrococcygeal ligament, the needle angle was lowered and the needle was advanced 1 cm. After negative aspiration of blood or CSF, and no evidence of paraesthesias,  the catheter was threaded through the needle into the epidural space using live fluoroscopy, contrast dye Omnipaque (300mg/mL) was injected to confirm placement and there was no vascular runoff. Fluoroscopic imaging in the AP and lateral views revealed a clear outline of the spinal nerve with proximal spread of agent through the caudal epidural space. Then 5 mL of the medication mixture listed above was injected slowly. Displacement of the radio opaque contrast after injection of the medication confirmed that the medication went into the area of the transforaminal spaces. The needles were removed and bleeding was nil. A sterile dressing was applied. No specimens collected. The patient tolerated the procedure well.       The patient was monitored after the procedure in the recovery area. They were given post-procedure and discharge instructions to follow at home. The patient was discharged in a stable condition.      Michelle Brink MD

## 2024-10-08 NOTE — DISCHARGE INSTRUCTIONS
Thank you for allowing us to care for you today. You may receive a survey about the care we provided. Your feedback is valuable and helps us provide excellent care throughout the community.     Home Care Instructions for Pain Management:    1. DIET:   You may resume your normal diet today.   2. BATHING:   You may shower with luke warm water. No tub baths or anything that will soak injection sites under water for the next 24 hours.  3. DRESSING:   You may remove your bandage today.   4. ACTIVITY LEVEL:   You may resume your normal activities 24 hrs after your procedure. Nothing strenuous today.  5. MEDICATIONS:   You may resume your normal medications today. To restart blood thinners, ask your doctor.  6. DRIVING    If you have received any sedatives by mouth today, you may not drive for 12 hours.    If you have received any sedation through your IV, you may not drive for 24 hrs.   7. SPECIAL INSTRUCTIONS:   No heat to the injection site for 24 hrs including, hot bath or shower, heating pad, moist heat, or hot tubs.    Use ice pack to injection site for any pain or discomfort.  Apply ice packs for 20 minute intervals as needed.    IF you have diabetes, be sure to monitor your blood sugar more closely. IF your injection contained steroids your blood sugar levels may become higher than normal.    If you are still having pain upon discharge:  Your pain may improve over the next 48 hours. The anesthetic (numbing medication) works immediately to 48 hours. IF your injection contained a steroid (anti-inflammatory medication), it takes approximately 3 days to start feeling relief and 7-10 days to see your greatest results from the medication. It is possible you may need subsequent injections. This would be discussed at your follow up appointment with pain management or your referring doctor.    Please call the PAIN MANAGEMENT office at [phone] or ON CALL pager at [phone] if you experienced any:   -Weakness or  loss of sensation  -Fever > 101.5  -Pain uncontrolled with oral medications   -Persistent nausea, vomiting, or diarrhea  -Redness or drainage from the injection sites, or any other worrisome concerns.   If physician on call was not reached or could not communicate with our office for any reason please go to the nearest emergency department.Adult Procedural Sedation Instructions    Recovery After Procedural Sedation (Adult)  You have been given medicine by vein to make you sleep during your surgery. This may have included both a pain medicine and sleeping medicine. Most of the effects have worn off. But you may still have some drowsiness for the next 6 to 8 hours.  Home care  Follow these guidelines when you get home:  For the next 8 hours, you should be watched by a responsible adult. This person should make sure your condition is not getting worse.  Don't drink any alcohol for the next 24 hours.  Don't drive, operate dangerous machinery, or make important business or personal decisions during the next 24 hours.  Note: Your healthcare provider may tell you not to take any medicine by mouth for pain or sleep in the next 4 hours. These medicines may react with the medicines you were given in the hospital. This could cause a much stronger response than usual.  Follow-up care  Follow up with your healthcare provider if you are not alert and back to your usual level of activity within 12 hours.  When to seek medical advice  Call your healthcare provider right away if any of these occur:  Drowsiness gets worse  Weakness or dizziness gets worse  Repeated vomiting  You can't be awakened   Date Last Reviewed: 10/18/2016  © 6736-1277 The "[employer] "[employer]. [address] [place] [address], [place] [address]. All rights reserved. This information is not intended as a substitute for professional medical care. Always follow your healthcare professional's instructions.

## 2024-10-08 NOTE — DISCHARGE SUMMARY
Discharge Note  Short Stay      SUMMARY     Admit Date: 10/8/2024    Attending Physician: Carlee Koch MD    Discharge Physician: Carlee Koch MD      Discharge Date: 10/8/2024 2:16 PM    Procedure(s) (LRB):  CAUDAL RAFA (N/A)    Final Diagnosis: Lumbosacral radiculopathy [M54.17]    Disposition: Home or self care    Patient Instructions:   Current Discharge Medication List        CONTINUE these medications which have NOT CHANGED    Details   lisinopriL (PRINIVIL,ZESTRIL) 5 MG tablet Take 1 tablet (5 mg total) by mouth once daily.  Qty: 90 tablet, Refills: 1    Comments: .  Associated Diagnoses: Essential hypertension                 Discharge Diagnosis: Lumbosacral radiculopathy [M54.17]  Condition on Discharge: Stable with no complications to procedure   Diet on Discharge: Same as before.  Activity: as per instruction sheet.  Discharge to: Home with a responsible adult.  Follow up: 2-4 weeks       Please call my office or pager at [phone] if experienced any weakness or loss of sensation, fever > 101.5, pain uncontrolled with oral medications, persistent nausea/vomiting/or diarrhea, redness or drainage from the incisions, or any other worrisome concerns. If physician on call was not reached or could not communicate with our office for any reason please go to the nearest emergency department     Michelle Brink MD

## 2024-10-22 ENCOUNTER — OFFICE VISIT (OUTPATIENT)
Dept: PAIN MEDICINE | Facility: CLINIC | Age: 69
End: 2024-10-22
Payer: MEDICARE

## 2024-10-22 VITALS
BODY MASS INDEX: 29.71 KG/M2 | TEMPERATURE: 97 F | DIASTOLIC BLOOD PRESSURE: 67 MMHG | OXYGEN SATURATION: 100 % | SYSTOLIC BLOOD PRESSURE: 121 MMHG | RESPIRATION RATE: 12 BRPM | HEIGHT: 74 IN | WEIGHT: 231.5 LBS | HEART RATE: 53 BPM

## 2024-10-22 DIAGNOSIS — M46.1 SACROILIITIS: Primary | ICD-10-CM

## 2024-10-22 DIAGNOSIS — M51.360 DEGENERATION OF INTERVERTEBRAL DISC OF LUMBAR REGION WITH DISCOGENIC BACK PAIN: ICD-10-CM

## 2024-10-22 DIAGNOSIS — M48.061 SPINAL STENOSIS OF LUMBAR REGION WITHOUT NEUROGENIC CLAUDICATION: ICD-10-CM

## 2024-10-22 DIAGNOSIS — G89.4 CHRONIC PAIN SYNDROME: ICD-10-CM

## 2024-10-22 DIAGNOSIS — M47.816 LUMBAR SPONDYLOSIS: ICD-10-CM

## 2024-10-22 DIAGNOSIS — M54.17 LUMBOSACRAL RADICULOPATHY: ICD-10-CM

## 2024-10-22 PROCEDURE — 3044F HG A1C LEVEL LT 7.0%: CPT | Mod: CPTII,S$GLB,,

## 2024-10-22 PROCEDURE — 4010F ACE/ARB THERAPY RXD/TAKEN: CPT | Mod: CPTII,S$GLB,,

## 2024-10-22 PROCEDURE — 3008F BODY MASS INDEX DOCD: CPT | Mod: CPTII,S$GLB,,

## 2024-10-22 PROCEDURE — 3078F DIAST BP <80 MM HG: CPT | Mod: CPTII,S$GLB,,

## 2024-10-22 PROCEDURE — 99214 OFFICE O/P EST MOD 30 MIN: CPT | Mod: S$GLB,,,

## 2024-10-22 PROCEDURE — 1159F MED LIST DOCD IN RCRD: CPT | Mod: CPTII,S$GLB,,

## 2024-10-22 PROCEDURE — 1125F AMNT PAIN NOTED PAIN PRSNT: CPT | Mod: CPTII,S$GLB,,

## 2024-10-22 PROCEDURE — 1101F PT FALLS ASSESS-DOCD LE1/YR: CPT | Mod: CPTII,S$GLB,,

## 2024-10-22 PROCEDURE — 3074F SYST BP LT 130 MM HG: CPT | Mod: CPTII,S$GLB,,

## 2024-10-22 PROCEDURE — 1160F RVW MEDS BY RX/DR IN RCRD: CPT | Mod: CPTII,S$GLB,,

## 2024-10-22 PROCEDURE — 3288F FALL RISK ASSESSMENT DOCD: CPT | Mod: CPTII,S$GLB,,

## 2024-10-22 PROCEDURE — 99999 PR PBB SHADOW E&M-EST. PATIENT-LVL III: CPT | Mod: PBBFAC,,,

## 2024-10-22 NOTE — H&P (VIEW-ONLY)
Interventional Pain Management - Established Visit  Follow-Up     PCP: Saud Coles MD    REFERRING PHYSICIAN: No ref. provider found (Neurosurgery)    CHIEF COMPLAINT: low back pain    Original HISTORY OF PRESENT ILLNESS: Leonardo Pisano presents to the clinic for the evaluation of the above pain. The pain started 20+ years ago    Original Pain Description:  The pain is located in the low back and is axial in nature.  He does endorsing radiating pain to bilateral posterior thigh and stops at the knee  The pain is described as sharp and tight band.   Exacerbating factors: Sitting, Standing, Laying, Morning, and Getting out of bed/chair.   Mitigating factors physical therapy and rest.   Symptoms interfere with daily activity and sleeping.   The patient feels like symptoms have been worsening.  Patient reports no distance restriction with walking  Patient denies night fever/night sweats, urinary incontinence, and bowel incontinence.  Occupation: retired        Original PAIN SCORES:  Best: Pain is 1  Worst: Pain is 10  Current: Pain is 2        10/22/2024    11:24 AM   Last 3 PDI Scores   Pain Disability Index (PDI) 28       INTERVAL HISTORY: (Newest visit at the bottom)   Interval History (10/22/2024):   Leonardo Pisano returns to clinic for follow-up after Caudal RAFA on 10/8/2024. He reports limited pain relief. He continues with left lower back/buttock pain. The pain is worse with walking and getting out of a chair. He continues Tylenol with some relief. He denies any perceived side effects. He denies recent health changes. He denies recent falls or trauma. He denies new onset fever/night sweats, urinary incontinence, bowel incontinence, significant weight changes, significant motor weakness or changes, or loss of sensations. His pain today is 3/10.        6 weeks of Conservative therapy:  PT: completed PT (9/2024)  Chiro: none  HEP: daily exercise including PT exercises. (>6 weeks within the last 6  month)    Relevant Surgical Hx  6/12/2024 - ACD C5-6 and C6-7 (Anuj)      Treatments / Medications: (Ice/Heat/NSAIDS/APAP/etc):  Tylenol      Blood thinners: none      Interventional Pain Procedures: (Previous injections)  Pain intervention 20 years ago that was effective for 1 year  10/8/2024 - Caudal RAFA         Past Medical History:   Diagnosis Date    Back pain     Degenerative disc disease     Disorder of kidney and ureter     stone    Essential hypertension 04/03/2018    Hepatitis     Hyperlipidemia     Hypoxia 06/02/2021    Obesity     Pneumonia     Pneumonia due to COVID-19 virus 06/03/2021    Trouble in sleeping     due to pain     Past Surgical History:   Procedure Laterality Date    EPIDURAL STEROID INJECTION N/A 10/8/2024    Procedure: CAUDAL RAFA;  Surgeon: Carlee Koch MD;  Location: [facility];  Service: Pain Management;  Laterality: N/A;  [phone]  2 WK F/U SONA    FUSION, SPINE, CERVICAL, ANTERIOR APPROACH N/A 6/12/2024    Procedure: C5-6,C6-7 ANTERIOR CERVICAL DISCECTOMY AND FUSION;  Surgeon: Umberto Leslie MD;  Location: [facility] OR [address] [place] [address];  Service: Neurosurgery;  Laterality: N/A;    SHOULDER SURGERY  4/25/12    rt shoulder     Social History     Socioeconomic History    Marital status:     Number of children: 2   Occupational History     Employer: [employer]   Tobacco Use    Smoking status: Never    Smokeless tobacco: Never   Substance and Sexual Activity    Alcohol use: No    Drug use: Never     Social Drivers of Health     Financial Resource Strain: Low Risk  (8/7/2024)    Overall Financial Resource Strain (CARDIA)     Difficulty of Paying Living Expenses: Not hard at all   Food Insecurity: No Food Insecurity (8/7/2024)    Hunger Vital Sign     Worried About Running Out of Food in the Last Year: Never true     Ran Out of Food in the Last Year: Never true   Transportation Needs: No Transportation Needs (6/13/2024)    TRANSPORTATION NEEDS     Transportation : No   Physical  Activity: Sufficiently Active (8/7/2024)    Exercise Vital Sign     Days of Exercise per Week: 4 days     Minutes of Exercise per Session: 40 min   Recent Concern: Physical Activity - Insufficiently Active (6/13/2024)    Exercise Vital Sign     Days of Exercise per Week: 7 days     Minutes of Exercise per Session: 20 min   Stress: No Stress Concern Present (8/7/2024)    [employer] - Occupational Stress Questionnaire     Feeling of Stress : Not at all   Housing Stability: Low Risk  (8/7/2024)    Housing Stability Vital Sign     Unable to Pay for Housing in the Last Year: No     Homeless in the Last Year: No     Family History   Problem Relation Name Age of Onset    Cancer Mother          liver    Heart disease Father          cabg    Hyperlipidemia Sister      Hyperlipidemia Sister      Diabetes Daughter      Cirrhosis Neg Hx         Review of patient's allergies indicates:   Allergen Reactions    No known allergies        Current Outpatient Medications   Medication Sig    lisinopriL (PRINIVIL,ZESTRIL) 5 MG tablet Take 1 tablet (5 mg total) by mouth once daily.     No current facility-administered medications for this visit.       ROS:  GENERAL: No fever. No chills. No fatigue. Denies weight loss. Denies weight gain.  HEENT: Denies headaches. Denies vision change. Denies eye pain. Denies double vision. Denies ear pain.   CV: Denies chest pain.   PULM: Denies of shortness of breath.  GI: Denies constipation. No diarrhea. No abdominal pain. Denies nausea. Denies vomiting. No blood in stool.  HEME: Denies bleeding problems.  : Denies urgency. No painful urination. No blood in urine.  MS: Denies joint stiffness. Denies joint swelling.  + back pain.  SKIN: Denies rash.   NEURO: Denies seizures. No weakness.  PSYCH:  Denies difficulty sleeping. No anxiety. Denies depression. No suicidal thoughts.       VITALS:   Vitals:    10/22/24 1124 10/22/24 1125   BP: 121/67    Pulse: (!) 53    Resp: 12  "   Temp: 97 °F (36.1 °C)    TempSrc: Oral    SpO2: 100%    Weight: 105 kg (231 lb 7.7 oz)    Height: 6' 2" (1.88 m)    PainSc:   2   3   PainLoc: Back          PHYSICAL EXAM:   GENERAL: Well appearing, in no acute distress, alert and oriented x3.  PSYCH:  Mood and affect appropriate.  SKIN: Skin color, texture, turgor normal, no rashes or lesions.  HEENT:  Normocephalic, atraumatic. Cranial nerves grossly intact.  PULM: No evidence of respiratory difficulty, symmetric chest rise.  GI:  Non-distended  BACK: Normal range of motion without pain on extension, flexion, facet loading. No pain to palpation over lumbar spinous process or facet joints bilaterally. SLR in the seated position negative to radicular pain. Positive axial loading test to the left. Positive tenderness over left SIJ with positive thigh and sacral thrust test, Positive FABERE,Ganselin and Yeoman's test to the left. Negative FADIR   EXTREMITIES: No deformities, edema, or skin discoloration.  No atrophy is noted   NEURO: Sensation is equal and appropriate bilaterally. Bilateral upper and lower extremity strength is normal and symmetric. Bilateral upper and lower extremity coordination and muscle stretch reflexes are physiologic and symmetric. Plantar response are downgoing.   Straight leg raising in the supine position is Negative Bilaterally to radicular pain.   GAIT: Normal, ambulates with no assistive devices        LABS:      IMAGING:    LUMBAR X-RAY  Results for orders placed during the hospital encounter of 04/09/24    X-Ray Lumbar Complete Including Flex And Ext    Narrative  EXAMINATION:  XR LUMBAR SPINE 5 VIEW WITH FLEX AND EXT    CLINICAL HISTORY:  Scoliosis, unspecified    TECHNIQUE:  Five views of the lumbar spine plus flexion extension views were performed.    COMPARISON:  04/26/2019    FINDINGS:  There is slight lumbar levocurvature.  There is grade 1 anterolisthesis of L5 on S1 accompanied by bilateral L5 pars defects, with " anterolisthesis mildly increased.  Mild multilevel degenerative changes are present.  No abnormal motion on flexion or extension is demonstrated.  There is moderate L4-5 and L5-S1 facet arthropathy.    Impression  Grade 1 L5-S1 spondylolisthesis, mildly increased.      Electronically signed by: Brendan Smith MD  Date:    04/09/2024  Time:    15:49     LUMBAR MRI  Results for orders placed during the hospital encounter of 04/24/24    MRI Lumbar Spine Without Contrast    Narrative  EXAMINATION:  MRI LUMBAR SPINE WITHOUT CONTRAST    CLINICAL HISTORY:  Scoliosis; Scoliosis, unspecified    TECHNIQUE:  Multiplanar, multi-sequence MR images were acquired from the thoracolumbar junction to the sacrum without the administration of contrast.    COMPARISON:  MRI cervical and thoracic spine-04/24/2024    FINDINGS:  There is transitional anatomy present at the lumbosacral junction.  The transitional segment will be labeled S1 with a fully formed intervertebral discs noted between S1 and S2.  There is a grade I anterolisthesis of S1 on S2 as a result of bilateral S1 pars defects.  There is a grade I retrolisthesis of L4 on L5 with associated uncovering of the intervertebral disc.  No acute lumbar compression fracture or pathologic marrow replacement process.  There is degenerative disc desiccation at L2-L3 through S1-S2.    The conus medullaris terminates at L1.  The visualized lower thoracic spinal cord is normal in signal intensity with no evidence of cord edema, myelomalacia, or cord syrinx.  No epidural fluid collections or masses.  The paraspinous musculature is unremarkable.    There are multiple foci of T2 signal hyperintensity within the left kidney in the parapelvic region in the cortex at the upper pole of the left kidney, most likely cysts.    T12-L1: There is a broad central/right paracentral disc protrusion which effaces the anterior CSF sleeve.  There is moderate effacement of the thecal sac.  No neuroforaminal  stenosis.    L1-L2: No disc protrusion or extrusion. No central canal stenosis or neuroforaminal stenosis.    L2-L3: There is a broad disc bulge which effaces the anterior CSF sleeve.  No disc protrusion or extrusion. No central canal stenosis or neuroforaminal stenosis.    L3-L4: There is a broad disc bulge which effaces the anterior CSF sleeve and extends into both neural foramina.  There is an annular fissure present within the central portion of the intervertebral disc.  There is, at most, mild right neuroforaminal stenosis.  No left neuroforaminal stenosis.    L4-L5: There is a grade I retrolisthesis of L4 on L5 with associated uncovering of the intervertebral disc.  There is a broad disc bulge which extends into both neural foramina.  There is ligamentum flavum thickening and bilateral hypertrophic facet arthropathy, right greater than left.  There is moderate overall central canal stenosis and moderate bilateral neuroforaminal stenosis.    L5-S1: There is a broad disc bulge which extends into both neural foramina.  There is bilateral hypertrophic degenerative facet arthropathy and ligamentum flavum thickening.  There is severe central canal stenosis, moderate left neuroforaminal stenosis, and moderate-severe right neuroforaminal stenosis.  Please correlate clinically for symptoms referable to the L5 nerves.    S1-S2: There is a grade I anterolisthesis of S1 on S2 with associated uncovering of the intervertebral disc.  There is a superimposed broad disc bulge which extends into both neural foramina and there is bilateral hypertrophic facet arthropathy and ligamentum flavum thickening.  There is prominent epidural fat deposition observed in the spinal canal at S1-S2.  There is severe effacement of the thecal sac as a result of epidural fat deposition in the aforementioned degenerative changes.  There is severe bilateral neuroforaminal stenosis, left greater than right, which could produce symptoms referable to  the S1 nerves.    Impression  1. Transitional anatomy at the lumbosacral junction.  Comparison was made to the cervical and thoracic MRI of today's date to determine correct nomenclature in the lumbar spine.  Please note that this represents differing nomenclature than was described at the time of the patient's MRI performed 05/03/2019.  2. Advanced multilevel degenerative change of the lumbosacral spine resulting in multilevel central canal and neuroforaminal stenosis as detailed above.  3. Grade I anterolisthesis of S1 on S2 and grade I retrolisthesis of L4 on L5  4. Probable left renal cysts  5. Other findings as detailed above.      Electronically signed by: Bryan Ho MD  Date:    04/24/2024  Time:    11:26         ASSESSMENT: 69 y.o. year old male with pain, consistent with:    Encounter Diagnoses   Name Primary?    Sacroiliitis Yes    Spinal stenosis of lumbar region without neurogenic claudication     Chronic pain syndrome     Lumbosacral radiculopathy     Degeneration of intervertebral disc of lumbar region with discogenic back pain     Lumbar spondylosis          DISCUSSION: Leonardo Pisano is a retired gentleman who comes to us from Dr. Leslie with 20+ years of low back pain which is worst with prolonged sitting, standing, laying down. He denies walking limitations. Imaging shows bilateral S1 pars defects, multi-level DDD with endplate changes at L4/5 and L5/S1 and mod-severe canal stenosis at L5/S1. Exam shows pain reproduced with facet loading, milgrams. They would like to avoid extensive surgery.         PLAN:  Previous imaging was reviewed and discussed with the patient today.   He is s/p Caudal RAFA with limited pain relief  Procedure order placed today for Left SI joint injection given HPI and exam.   Discussed minimally invasive treatment options.   Continue HEP  Continue OTC for pain control.   RTC 2-3 weeks after above procedure.  Consider Intracept    The above plan and management options were  discussed at length with patient. Patient is in agreement with the above and verbalized understanding.    Marilyn Carrasco NP  10/22/2024

## 2024-10-23 ENCOUNTER — PATIENT MESSAGE (OUTPATIENT)
Dept: ADMINISTRATIVE | Facility: OTHER | Age: 69
End: 2024-10-23
Payer: MEDICARE

## 2024-10-23 ENCOUNTER — PATIENT MESSAGE (OUTPATIENT)
Dept: PAIN MEDICINE | Facility: OTHER | Age: 69
End: 2024-10-23
Payer: MEDICARE

## 2024-11-06 ENCOUNTER — PATIENT MESSAGE (OUTPATIENT)
Dept: ADMINISTRATIVE | Facility: OTHER | Age: 69
End: 2024-11-06
Payer: MEDICARE

## 2024-11-12 ENCOUNTER — HOSPITAL ENCOUNTER (OUTPATIENT)
Facility: OTHER | Age: 69
Discharge: HOME | End: 2024-11-12
Attending: ANESTHESIOLOGY | Admitting: ANESTHESIOLOGY
Payer: MEDICARE

## 2024-11-12 VITALS
HEIGHT: 74 IN | WEIGHT: 230 LBS | DIASTOLIC BLOOD PRESSURE: 68 MMHG | TEMPERATURE: 97 F | SYSTOLIC BLOOD PRESSURE: 139 MMHG | OXYGEN SATURATION: 98 % | RESPIRATION RATE: 18 BRPM | BODY MASS INDEX: 29.52 KG/M2 | HEART RATE: 57 BPM

## 2024-11-12 DIAGNOSIS — G89.29 CHRONIC PAIN: ICD-10-CM

## 2024-11-12 DIAGNOSIS — M54.16 LUMBAR RADICULOPATHY: Primary | ICD-10-CM

## 2024-11-12 PROCEDURE — 63600175 PHARM REV CODE 636 W HCPCS: Performed by: ANESTHESIOLOGY

## 2024-11-12 PROCEDURE — 25500020 PHARM REV CODE 255: Performed by: ANESTHESIOLOGY

## 2024-11-12 PROCEDURE — 27096 INJECT SACROILIAC JOINT: CPT | Mod: LT | Performed by: ANESTHESIOLOGY

## 2024-11-12 PROCEDURE — 27096 INJECT SACROILIAC JOINT: CPT | Mod: LT,,, | Performed by: ANESTHESIOLOGY

## 2024-11-12 RX ORDER — TRIAMCINOLONE ACETONIDE 40 MG/ML
INJECTION, SUSPENSION INTRA-ARTICULAR; INTRAMUSCULAR
Status: DISCONTINUED | OUTPATIENT
Start: 2024-11-12 | End: 2024-11-12 | Stop reason: HOSPADM

## 2024-11-12 RX ORDER — BUPIVACAINE HYDROCHLORIDE 2.5 MG/ML
INJECTION, SOLUTION EPIDURAL; INFILTRATION; INTRACAUDAL
Status: DISCONTINUED | OUTPATIENT
Start: 2024-11-12 | End: 2024-11-12 | Stop reason: HOSPADM

## 2024-11-12 RX ORDER — LIDOCAINE HYDROCHLORIDE 20 MG/ML
INJECTION, SOLUTION INFILTRATION; PERINEURAL
Status: DISCONTINUED | OUTPATIENT
Start: 2024-11-12 | End: 2024-11-12 | Stop reason: HOSPADM

## 2024-11-12 RX ORDER — SODIUM CHLORIDE 9 MG/ML
INJECTION, SOLUTION INTRAVENOUS CONTINUOUS
Status: DISCONTINUED | OUTPATIENT
Start: 2024-11-12 | End: 2024-11-12 | Stop reason: HOSPADM

## 2024-11-12 NOTE — DISCHARGE INSTRUCTIONS

## 2024-11-12 NOTE — OP NOTE
Sacroiliac Joint Injection under Fluoroscopic Guidance    The procedure, risks, benefits, and options were discussed with the patient. There are no contraindications to the procedure. The patent expressed understanding and agreed to the procedure. Informed written consent was obtained prior to the start of the procedure and can be found in the patient's chart.    PATIENT NAME: Leonardo Pisano   MRN: [id number]     DATE OF PROCEDURE: 11/12/2024    PROCEDURE: Left Sacroiliac Joint Injection under Fluoroscopic Guidance    PRE-OP DIAGNOSIS: Sacroiliitis [M46.1]    POST-OP DIAGNOSIS: Same    PHYSICIAN: Carlee Koch MD    ASSISTANTS: TRICIA Ibrahim     MEDICATIONS INJECTED: Preservative-free Kenalog 40mg with 3cc of Bupivacine 0.25%     LOCAL ANESTHETIC INJECTED: Xylocaine 2%     SEDATION: None    ESTIMATED BLOOD LOSS: None    COMPLICATIONS: None    TECHNIQUE: Time-out was performed to identify the patient and procedure to be performed. With the patient laying in a prone position, the surgical area was prepped and draped in the usual sterile fashion using ChloraPrep and a fenestrated drape. The sacroiliac joint was determined under fluoroscopy guidance. Skin anesthesia was achieved by injecting Lidocaine 2% over the injection site. The sacroiliac joint was  then approached with a 22 gauge, 5 inch spinal quinke needle that was introduced under fluoroscopic guidance in the AP and Lateral views. Once the needle tip was in the area of the joint, and there was no blood, contrast dye Omnipaque (300mg/mL) was injected to confirm placement and there was no vascular runoff. Fluoroscopic imaging in the AP and lateral views revealed a clear outline of the joint space. 4 mL of the medication mixture listed above was injected slowly intraarticular and amandeep-articular. Displacement of the radio opaque contrast after injection of the medication confirmed that the medication went into the area of the joint. The needles were removed and  bleeding was nil. A sterile dressing was applied. No specimens collected. The patient tolerated the procedure well.     The patient was monitored after the procedure in the recovery area. They were given post-procedure and discharge instructions to follow at home. The patient was discharged in a stable condition.    Jaciel Gray DO     I reviewed and edited the fellow's note. I conducted my own interview and physical examination. I agree with the findings. I was present and supervising all critical portions of the procedure.

## 2024-11-12 NOTE — INTERVAL H&P NOTE
The patient has been examined and the H&P has been reviewed:    I concur with the findings and no changes have occurred since H&P was written.    Procedure and anesthesia risks, benefits and alternative options discussed and understood by patient/family.      There are no hospital problems to display for this patient.

## 2024-11-12 NOTE — DISCHARGE SUMMARY
Discharge Note  Short Stay      SUMMARY     Admit Date: 11/12/2024    Attending Physician: Carlee Koch MD    Discharge Physician: Carlee Koch MD      Discharge Date: 11/12/2024 9:46 AM    Procedure(s) (LRB):  INJECTION,SACROILIAC JOINT LEFT (Left)    Final Diagnosis: Sacroiliitis [M46.1]    Disposition: Home or self care    Patient Instructions:   Current Discharge Medication List        CONTINUE these medications which have NOT CHANGED    Details   lisinopriL (PRINIVIL,ZESTRIL) 5 MG tablet Take 1 tablet (5 mg total) by mouth once daily.  Qty: 90 tablet, Refills: 1    Comments: .  Associated Diagnoses: Essential hypertension                 Discharge Diagnosis: Sacroiliitis [M46.1]  Condition on Discharge: Stable with no complications to procedure   Diet on Discharge: Same as before.  Activity: as per instruction sheet.  Discharge to: Home with a responsible adult.  Follow up: 2-4 weeks       Please call my office or pager at [phone] if experienced any weakness or loss of sensation, fever > 101.5, pain uncontrolled with oral medications, persistent nausea/vomiting/or diarrhea, redness or drainage from the incisions, or any other worrisome concerns. If physician on call was not reached or could not communicate with our office for any reason please go to the nearest emergency department     Connor Rudnicki DO [facility] Resident, [facility]

## 2024-11-18 ENCOUNTER — PATIENT MESSAGE (OUTPATIENT)
Dept: INTERNAL MEDICINE | Facility: CLINIC | Age: 69
End: 2024-11-18
Payer: MEDICARE

## 2024-11-25 DIAGNOSIS — I10 ESSENTIAL HYPERTENSION: ICD-10-CM

## 2024-11-25 RX ORDER — LISINOPRIL 5 MG/1
5 TABLET ORAL
Qty: 90 TABLET | Refills: 1 | Status: SHIPPED | OUTPATIENT
Start: 2024-11-25

## 2024-11-25 NOTE — TELEPHONE ENCOUNTER
No care due was identified.  [facility] Embedded Care Due Messages. Reference number: [id number].   11/25/2024 12:17:19 AM CST

## 2024-11-25 NOTE — TELEPHONE ENCOUNTER
Refill Decision Note   Leonardo Pisano  is requesting a refill authorization.  Brief Assessment and Rationale for Refill:  Approve     Medication Therapy Plan:         Comments:     Note composed:3:36 AM 11/25/2024

## 2024-11-27 ENCOUNTER — PATIENT MESSAGE (OUTPATIENT)
Dept: FAMILY MEDICINE | Facility: CLINIC | Age: 69
End: 2024-11-27
Payer: MEDICARE

## 2024-11-27 DIAGNOSIS — I10 ESSENTIAL HYPERTENSION: Primary | ICD-10-CM

## 2024-11-27 DIAGNOSIS — E78.2 MIXED HYPERLIPIDEMIA: ICD-10-CM

## 2024-11-27 NOTE — TELEPHONE ENCOUNTER
Diagnoses and all orders for this visit:    Essential hypertension  -     Comprehensive Metabolic Panel; Future  -     Lipid Panel; Future    Mixed hyperlipidemia  -     Comprehensive Metabolic Panel; Future  -     Lipid Panel; Future

## 2024-12-04 ENCOUNTER — OFFICE VISIT (OUTPATIENT)
Dept: PAIN MEDICINE | Facility: CLINIC | Age: 69
End: 2024-12-04
Payer: MEDICARE

## 2024-12-04 VITALS
DIASTOLIC BLOOD PRESSURE: 70 MMHG | HEART RATE: 49 BPM | BODY MASS INDEX: 29.42 KG/M2 | SYSTOLIC BLOOD PRESSURE: 117 MMHG | HEIGHT: 74 IN | OXYGEN SATURATION: 100 % | WEIGHT: 229.25 LBS | RESPIRATION RATE: 12 BRPM

## 2024-12-04 DIAGNOSIS — G89.4 CHRONIC PAIN SYNDROME: Primary | ICD-10-CM

## 2024-12-04 DIAGNOSIS — M46.1 SACROILIITIS: ICD-10-CM

## 2024-12-04 DIAGNOSIS — M51.360 DEGENERATION OF INTERVERTEBRAL DISC OF LUMBAR REGION WITH DISCOGENIC BACK PAIN: ICD-10-CM

## 2024-12-04 DIAGNOSIS — M47.816 LUMBAR SPONDYLOSIS: ICD-10-CM

## 2024-12-04 PROCEDURE — 3288F FALL RISK ASSESSMENT DOCD: CPT | Mod: CPTII,S$GLB,,

## 2024-12-04 PROCEDURE — 99999 PR PBB SHADOW E&M-EST. PATIENT-LVL III: CPT | Mod: PBBFAC,,,

## 2024-12-04 PROCEDURE — 3008F BODY MASS INDEX DOCD: CPT | Mod: CPTII,S$GLB,,

## 2024-12-04 PROCEDURE — 3074F SYST BP LT 130 MM HG: CPT | Mod: CPTII,S$GLB,,

## 2024-12-04 PROCEDURE — 3044F HG A1C LEVEL LT 7.0%: CPT | Mod: CPTII,S$GLB,,

## 2024-12-04 PROCEDURE — 99213 OFFICE O/P EST LOW 20 MIN: CPT | Mod: S$GLB,,,

## 2024-12-04 PROCEDURE — 3078F DIAST BP <80 MM HG: CPT | Mod: CPTII,S$GLB,,

## 2024-12-04 PROCEDURE — 4010F ACE/ARB THERAPY RXD/TAKEN: CPT | Mod: CPTII,S$GLB,,

## 2024-12-04 PROCEDURE — 1160F RVW MEDS BY RX/DR IN RCRD: CPT | Mod: CPTII,S$GLB,,

## 2024-12-04 PROCEDURE — 1159F MED LIST DOCD IN RCRD: CPT | Mod: CPTII,S$GLB,,

## 2024-12-04 PROCEDURE — 1101F PT FALLS ASSESS-DOCD LE1/YR: CPT | Mod: CPTII,S$GLB,,

## 2024-12-04 PROCEDURE — 1125F AMNT PAIN NOTED PAIN PRSNT: CPT | Mod: CPTII,S$GLB,,

## 2024-12-04 NOTE — PROGRESS NOTES
Interventional Pain Management - Established Visit  Follow-Up     PCP: Saud Coles MD    REFERRING PHYSICIAN: No ref. provider found (Neurosurgery)    CHIEF COMPLAINT: low back pain    Original HISTORY OF PRESENT ILLNESS: Leonardo Pisano presents to the clinic for the evaluation of the above pain. The pain started 20+ years ago    Original Pain Description:  The pain is located in the low back and is axial in nature.  He does endorsing radiating pain to bilateral posterior thigh and stops at the knee  The pain is described as sharp and tight band.   Exacerbating factors: Sitting, Standing, Laying, Morning, and Getting out of bed/chair.   Mitigating factors physical therapy and rest.   Symptoms interfere with daily activity and sleeping.   The patient feels like symptoms have been worsening.  Patient reports no distance restriction with walking  Patient denies night fever/night sweats, urinary incontinence, and bowel incontinence.  Occupation: retired        Original PAIN SCORES:  Best: Pain is 1  Worst: Pain is 10  Current: Pain is 2        12/4/2024    11:18 AM   Last 3 PDI Scores   Pain Disability Index (PDI) 12       INTERVAL HISTORY: (Newest visit at the bottom)   Interval History (10/22/2024):   Leonardo Pisano returns to clinic for follow-up after Caudal RAFA on 10/8/2024. He reports limited pain relief. He continues with left lower back/buttock pain. The pain is worse with walking and getting out of a chair. He continues Tylenol with some relief. He denies any perceived side effects. He denies recent health changes. He denies recent falls or trauma. He denies new onset fever/night sweats, urinary incontinence, bowel incontinence, significant weight changes, significant motor weakness or changes, or loss of sensations. His pain today is 3/10.      Interval History (12/4/2024):  Leonardo Pisano returns to clinic for follow-up after left SI joint injection on 11/12/2024. He reports 90% relief and  improved quality of life. He has noticed improved sleep. He continues extra strength tylenol with some relief. He denies any perceived side effects. He continues exercise and HEP daily. He denies recent health changes. He denies recent falls or trauma. He denies new onset fever/night sweats, urinary incontinence, bowel incontinence, significant weight changes, significant motor weakness or changes, or loss of sensations. His pain today is 2/10.       6 weeks of Conservative therapy:  PT: completed PT (9/2024)  Chiro: none  HEP: daily exercise including PT exercises. (>6 weeks within the last 6 month)    Relevant Surgical Hx  6/12/2024 - ACD C5-6 and C6-7 (Anuj)      Treatments / Medications: (Ice/Heat/NSAIDS/APAP/etc):  Tylenol      Blood thinners: none      Interventional Pain Procedures: (Previous injections)  Pain intervention 20 years ago that was effective for 1 year  10/8/2024 - Caudal RAFA   11/12/2024 - Left SI joint injection - 90% relief        Past Medical History:   Diagnosis Date    Back pain     Degenerative disc disease     Disorder of kidney and ureter     stone    Essential hypertension 04/03/2018    Hepatitis     Hyperlipidemia     Hypoxia 06/02/2021    Obesity     Pneumonia     Pneumonia due to COVID-19 virus 06/03/2021    Trouble in sleeping     due to pain     Past Surgical History:   Procedure Laterality Date    EPIDURAL STEROID INJECTION N/A 10/8/2024    Procedure: CAUDAL RAFA;  Surgeon: Carlee Koch MD;  Location: [facility];  Service: Pain Management;  Laterality: N/A;  [phone]  2 WK F/U SONA    FUSION, SPINE, CERVICAL, ANTERIOR APPROACH N/A 6/12/2024    Procedure: C5-6,C6-7 ANTERIOR CERVICAL DISCECTOMY AND FUSION;  Surgeon: Umberto Leslie MD;  Location: [facility] OR [address] [place] [address];  Service: Neurosurgery;  Laterality: N/A;    INJECTION, SACROILIAC JOINT Left 11/12/2024    Procedure: INJECTION,SACROILIAC JOINT LEFT;  Surgeon: Carlee Koch MD;  Location: [facility];  Service: Pain  Management;  Laterality: Left;  [phone]  2 WK F/U MARCELO    SHOULDER SURGERY  4/25/12    rt shoulder     Social History     Socioeconomic History    Marital status:     Number of children: 2   Occupational History     Employer: [employer]   Tobacco Use    Smoking status: Never    Smokeless tobacco: Never   Substance and Sexual Activity    Alcohol use: No    Drug use: Never     Social Drivers of Health     Financial Resource Strain: Low Risk  (8/7/2024)    Overall Financial Resource Strain (CARDIA)     Difficulty of Paying Living Expenses: Not hard at all   Food Insecurity: No Food Insecurity (8/7/2024)    Hunger Vital Sign     Worried About Running Out of Food in the Last Year: Never true     Ran Out of Food in the Last Year: Never true   Transportation Needs: No Transportation Needs (6/13/2024)    TRANSPORTATION NEEDS     Transportation : No   Physical Activity: Sufficiently Active (8/7/2024)    Exercise Vital Sign     Days of Exercise per Week: 4 days     Minutes of Exercise per Session: 40 min   Recent Concern: Physical Activity - Insufficiently Active (6/13/2024)    Exercise Vital Sign     Days of Exercise per Week: 7 days     Minutes of Exercise per Session: 20 min   Stress: No Stress Concern Present (8/7/2024)    [employer] [place] [employer] - Occupational Stress Questionnaire     Feeling of Stress : Not at all   Housing Stability: Low Risk  (8/7/2024)    Housing Stability Vital Sign     Unable to Pay for Housing in the Last Year: No     Homeless in the Last Year: No     Family History   Problem Relation Name Age of Onset    Cancer Mother          liver    Heart disease Father          cabg    Hyperlipidemia Sister      Hyperlipidemia Sister      Diabetes Daughter      Cirrhosis Neg Hx         Review of patient's allergies indicates:   Allergen Reactions    No known allergies        Current Outpatient Medications   Medication Sig    lisinopriL (PRINIVIL,ZESTRIL) 5 MG tablet TAKE 1  "TABLET BY MOUTH EVERY DAY     No current facility-administered medications for this visit.       ROS:  GENERAL: No fever. No chills. No fatigue. Denies weight loss. Denies weight gain.  HEENT: Denies headaches. Denies vision change. Denies eye pain. Denies double vision. Denies ear pain.   CV: Denies chest pain.   PULM: Denies of shortness of breath.  GI: Denies constipation. No diarrhea. No abdominal pain. Denies nausea. Denies vomiting. No blood in stool.  HEME: Denies bleeding problems.  : Denies urgency. No painful urination. No blood in urine.  MS: Denies joint stiffness. Denies joint swelling.  + back pain.  SKIN: Denies rash.   NEURO: Denies seizures. No weakness.  PSYCH:  Denies difficulty sleeping. No anxiety. Denies depression. No suicidal thoughts.       VITALS:   Vitals:    12/04/24 1116 12/04/24 1118   BP: 117/70    Pulse: (!) 49    Resp: 12    SpO2: 100%    Weight: 104 kg (229 lb 4.5 oz)    Height: 6' 2" (1.88 m)    PainSc:   1   2   PainLoc: Back          PHYSICAL EXAM:   GENERAL: Well appearing, in no acute distress, alert and oriented x3.  PSYCH:  Mood and affect appropriate.  SKIN: Skin color, texture, turgor normal, no rashes or lesions.  HEENT:  Normocephalic, atraumatic. Cranial nerves grossly intact.  PULM: No evidence of respiratory difficulty, symmetric chest rise.  GI:  Non-distended  BACK: Normal range of motion without pain on extension, flexion, facet loading. No pain to palpation over lumbar spinous process or facet joints bilaterally. SLR in the seated position negative to radicular pain. Negative axial loading. No pain to palpation over bilateral SI joints. Negative FADIR   EXTREMITIES: No deformities, edema, or skin discoloration.  No atrophy is noted   NEURO: Sensation is equal and appropriate bilaterally. Bilateral upper and lower extremity strength is normal and symmetric. Bilateral upper and lower extremity coordination and muscle stretch reflexes are physiologic and symmetric. " Plantar response are downgoing.   Straight leg raising in the supine position is Negative Bilaterally to radicular pain.   GAIT: Normal, ambulates with no assistive devices      LABS:      IMAGING:    LUMBAR X-RAY  Results for orders placed during the hospital encounter of 04/09/24    X-Ray Lumbar Complete Including Flex And Ext    Narrative  EXAMINATION:  XR LUMBAR SPINE 5 VIEW WITH FLEX AND EXT    CLINICAL HISTORY:  Scoliosis, unspecified    TECHNIQUE:  Five views of the lumbar spine plus flexion extension views were performed.    COMPARISON:  04/26/2019    FINDINGS:  There is slight lumbar levocurvature.  There is grade 1 anterolisthesis of L5 on S1 accompanied by bilateral L5 pars defects, with anterolisthesis mildly increased.  Mild multilevel degenerative changes are present.  No abnormal motion on flexion or extension is demonstrated.  There is moderate L4-5 and L5-S1 facet arthropathy.    Impression  Grade 1 L5-S1 spondylolisthesis, mildly increased.      Electronically signed by: Brendan Smith MD  Date:    04/09/2024  Time:    15:49     LUMBAR MRI  Results for orders placed during the hospital encounter of 04/24/24    MRI Lumbar Spine Without Contrast    Narrative  EXAMINATION:  MRI LUMBAR SPINE WITHOUT CONTRAST    CLINICAL HISTORY:  Scoliosis; Scoliosis, unspecified    TECHNIQUE:  Multiplanar, multi-sequence MR images were acquired from the thoracolumbar junction to the sacrum without the administration of contrast.    COMPARISON:  MRI cervical and thoracic spine-04/24/2024    FINDINGS:  There is transitional anatomy present at the lumbosacral junction.  The transitional segment will be labeled S1 with a fully formed intervertebral discs noted between S1 and S2.  There is a grade I anterolisthesis of S1 on S2 as a result of bilateral S1 pars defects.  There is a grade I retrolisthesis of L4 on L5 with associated uncovering of the intervertebral disc.  No acute lumbar compression fracture or pathologic  marrow replacement process.  There is degenerative disc desiccation at L2-L3 through S1-S2.    The conus medullaris terminates at L1.  The visualized lower thoracic spinal cord is normal in signal intensity with no evidence of cord edema, myelomalacia, or cord syrinx.  No epidural fluid collections or masses.  The paraspinous musculature is unremarkable.    There are multiple foci of T2 signal hyperintensity within the left kidney in the parapelvic region in the cortex at the upper pole of the left kidney, most likely cysts.    T12-L1: There is a broad central/right paracentral disc protrusion which effaces the anterior CSF sleeve.  There is moderate effacement of the thecal sac.  No neuroforaminal stenosis.    L1-L2: No disc protrusion or extrusion. No central canal stenosis or neuroforaminal stenosis.    L2-L3: There is a broad disc bulge which effaces the anterior CSF sleeve.  No disc protrusion or extrusion. No central canal stenosis or neuroforaminal stenosis.    L3-L4: There is a broad disc bulge which effaces the anterior CSF sleeve and extends into both neural foramina.  There is an annular fissure present within the central portion of the intervertebral disc.  There is, at most, mild right neuroforaminal stenosis.  No left neuroforaminal stenosis.    L4-L5: There is a grade I retrolisthesis of L4 on L5 with associated uncovering of the intervertebral disc.  There is a broad disc bulge which extends into both neural foramina.  There is ligamentum flavum thickening and bilateral hypertrophic facet arthropathy, right greater than left.  There is moderate overall central canal stenosis and moderate bilateral neuroforaminal stenosis.    L5-S1: There is a broad disc bulge which extends into both neural foramina.  There is bilateral hypertrophic degenerative facet arthropathy and ligamentum flavum thickening.  There is severe central canal stenosis, moderate left neuroforaminal stenosis, and moderate-severe right  neuroforaminal stenosis.  Please correlate clinically for symptoms referable to the L5 nerves.    S1-S2: There is a grade I anterolisthesis of S1 on S2 with associated uncovering of the intervertebral disc.  There is a superimposed broad disc bulge which extends into both neural foramina and there is bilateral hypertrophic facet arthropathy and ligamentum flavum thickening.  There is prominent epidural fat deposition observed in the spinal canal at S1-S2.  There is severe effacement of the thecal sac as a result of epidural fat deposition in the aforementioned degenerative changes.  There is severe bilateral neuroforaminal stenosis, left greater than right, which could produce symptoms referable to the S1 nerves.    Impression  1. Transitional anatomy at the lumbosacral junction.  Comparison was made to the cervical and thoracic MRI of today's date to determine correct nomenclature in the lumbar spine.  Please note that this represents differing nomenclature than was described at the time of the patient's MRI performed 05/03/2019.  2. Advanced multilevel degenerative change of the lumbosacral spine resulting in multilevel central canal and neuroforaminal stenosis as detailed above.  3. Grade I anterolisthesis of S1 on S2 and grade I retrolisthesis of L4 on L5  4. Probable left renal cysts  5. Other findings as detailed above.      Electronically signed by: Bryan Ho MD  Date:    04/24/2024  Time:    11:26         ASSESSMENT: 69 y.o. year old male with pain, consistent with:    Encounter Diagnoses   Name Primary?    Chronic pain syndrome Yes    Sacroiliitis     Lumbar spondylosis     Degeneration of intervertebral disc of lumbar region with discogenic back pain        DISCUSSION: Leonardo Pisano is a retired gentleman who comes to us from Dr. Leslie with 20+ years of low back pain which is worst with prolonged sitting, standing, laying down. He denies walking limitations. Imaging shows bilateral S1 pars defects,  multi-level DDD with endplate changes at L4/5 and L5/S1 and mod-severe canal stenosis at L5/S1. Exam shows pain reproduced with facet loading, milgrams. They would like to avoid extensive surgery.         PLAN:  Previous imaging was reviewed and discussed with the patient today.   He s/p Left SI joint injection with 90% relief   Discussed minimally invasive treatment options.   Continue HEP  Continue OTC for pain control.   RTC as needed. He will schedule at his convenience.   Consider Intracept    The above plan and management options were discussed at length with patient. Patient is in agreement with the above and verbalized understanding.    Marilyn Carrasco NP  12/04/2024

## 2024-12-06 ENCOUNTER — LAB VISIT (OUTPATIENT)
Dept: LAB | Facility: HOSPITAL | Age: 69
End: 2024-12-06
Attending: FAMILY MEDICINE
Payer: MEDICARE

## 2024-12-06 DIAGNOSIS — E78.2 MIXED HYPERLIPIDEMIA: ICD-10-CM

## 2024-12-06 DIAGNOSIS — I10 ESSENTIAL HYPERTENSION: ICD-10-CM

## 2024-12-06 LAB
ALBUMIN SERPL BCP-MCNC: 3.7 G/DL (ref 3.5–5.2)
ALP SERPL-CCNC: 103 U/L (ref 40–150)
ALT SERPL W/O P-5'-P-CCNC: 57 U/L (ref 10–44)
ANION GAP SERPL CALC-SCNC: 7 MMOL/L (ref 8–16)
AST SERPL-CCNC: 33 U/L (ref 10–40)
BILIRUB SERPL-MCNC: 0.7 MG/DL (ref 0.1–1)
BUN SERPL-MCNC: 22 MG/DL (ref 8–23)
CALCIUM SERPL-MCNC: 9.6 MG/DL (ref 8.7–10.5)
CHLORIDE SERPL-SCNC: 107 MMOL/L (ref 95–110)
CHOLEST SERPL-MCNC: 271 MG/DL (ref 120–199)
CHOLEST/HDLC SERPL: 3.7 {RATIO} (ref 2–5)
CO2 SERPL-SCNC: 26 MMOL/L (ref 23–29)
CREAT SERPL-MCNC: 0.9 MG/DL (ref 0.5–1.4)
EST. GFR  (NO RACE VARIABLE): >60 ML/MIN/1.73 M^2
GLUCOSE SERPL-MCNC: 109 MG/DL (ref 70–110)
HDLC SERPL-MCNC: 73 MG/DL (ref 40–75)
HDLC SERPL: 26.9 % (ref 20–50)
LDLC SERPL CALC-MCNC: 175.6 MG/DL (ref 63–159)
NONHDLC SERPL-MCNC: 198 MG/DL
POTASSIUM SERPL-SCNC: 4.9 MMOL/L (ref 3.5–5.1)
PROT SERPL-MCNC: 6.8 G/DL (ref 6–8.4)
SODIUM SERPL-SCNC: 140 MMOL/L (ref 136–145)
TRIGL SERPL-MCNC: 112 MG/DL (ref 30–150)

## 2024-12-06 PROCEDURE — 80053 COMPREHEN METABOLIC PANEL: CPT | Performed by: FAMILY MEDICINE

## 2024-12-06 PROCEDURE — 36415 COLL VENOUS BLD VENIPUNCTURE: CPT | Performed by: FAMILY MEDICINE

## 2024-12-06 PROCEDURE — 80061 LIPID PANEL: CPT | Performed by: FAMILY MEDICINE

## 2024-12-09 ENCOUNTER — OFFICE VISIT (OUTPATIENT)
Dept: FAMILY MEDICINE | Facility: CLINIC | Age: 69
End: 2024-12-09
Payer: MEDICARE

## 2024-12-09 VITALS
HEART RATE: 64 BPM | RESPIRATION RATE: 18 BRPM | HEIGHT: 74 IN | BODY MASS INDEX: 28.84 KG/M2 | OXYGEN SATURATION: 95 % | WEIGHT: 224.75 LBS

## 2024-12-09 DIAGNOSIS — I10 ESSENTIAL HYPERTENSION: ICD-10-CM

## 2024-12-09 DIAGNOSIS — M48.061 SPINAL STENOSIS OF LUMBAR REGION WITHOUT NEUROGENIC CLAUDICATION: ICD-10-CM

## 2024-12-09 DIAGNOSIS — G72.0 STATIN MYOPATHY: ICD-10-CM

## 2024-12-09 DIAGNOSIS — T46.6X5A STATIN MYOPATHY: ICD-10-CM

## 2024-12-09 DIAGNOSIS — E78.2 MIXED HYPERLIPIDEMIA: Primary | ICD-10-CM

## 2024-12-09 PROCEDURE — 4010F ACE/ARB THERAPY RXD/TAKEN: CPT | Mod: CPTII,S$GLB,, | Performed by: FAMILY MEDICINE

## 2024-12-09 PROCEDURE — 3044F HG A1C LEVEL LT 7.0%: CPT | Mod: CPTII,S$GLB,, | Performed by: FAMILY MEDICINE

## 2024-12-09 PROCEDURE — 1160F RVW MEDS BY RX/DR IN RCRD: CPT | Mod: CPTII,S$GLB,, | Performed by: FAMILY MEDICINE

## 2024-12-09 PROCEDURE — 1159F MED LIST DOCD IN RCRD: CPT | Mod: CPTII,S$GLB,, | Performed by: FAMILY MEDICINE

## 2024-12-09 PROCEDURE — 3008F BODY MASS INDEX DOCD: CPT | Mod: CPTII,S$GLB,, | Performed by: FAMILY MEDICINE

## 2024-12-09 PROCEDURE — 99214 OFFICE O/P EST MOD 30 MIN: CPT | Mod: S$GLB,,, | Performed by: FAMILY MEDICINE

## 2024-12-09 PROCEDURE — 99999 PR PBB SHADOW E&M-EST. PATIENT-LVL III: CPT | Mod: PBBFAC,,, | Performed by: FAMILY MEDICINE

## 2024-12-09 NOTE — PROGRESS NOTES
History of Present Illness    CHIEF COMPLAINT:  Patient presents today for a routine checkup.    WEIGHT MANAGEMENT AND LIFESTYLE CHANGES:  He reports recent weight loss of 18 lbs since his last appointment. He has started going to the gym five days a week and has reduced his carbohydrate intake, noting increased energy levels. He acknowledges a need to increase fruit and vegetable consumption. He has implemented a daily 30-minute morning stretching routine and reports improved ability to exercise due to reduced pain and increased mobility.    NECK AND BACK ISSUES:  He underwent neck surgery in June for two crushed discs, involving removal of the affected discs and insertion of disc spacers. He reports a positive outcome with no associated pain either before or after the procedure. He is currently in the recovery period. For his severe back problems, he received an epidural injection through the tailbone and an SI joint injection about 2.5-3 weeks ago. After initial worsening, the SI joint injection has provided significant improvement. He is currently not experiencing extreme pain for the first time in a while. He has adjusted his exercise routine to include longer, slower walks while maintaining upright posture.    CARDIOVASCULAR HEALTH:  He reports a low resting heart rate, typically in the 40s to 50-51 BPM, increasing to 130 BPM during exercise. He denies any associated symptoms such as fainting or dizziness. He expresses willingness to consider medication for cholesterol management despite previous experience with muscle aches from a low-dose statin. He inquires about alternative cholesterol-lowering medications but is open to trying a low-dose statin again.    MEDICATIONS:  He has discontinued Ecotrin (aspirin) and Valium prior to a recent surgery and has not resumed them, reporting feeling better after stopping these medications.    LIVER HEALTH:  He was previously diagnosed with fatty liver. A recent fiber  scan of the liver showed no abnormalities beyond the known fatty liver. A follow-up fiber scan is scheduled for September 2025.      ROS:  General: -fever, -chills, -fatigue, -weight gain, +weight loss  Eyes: -vision changes, -redness, -discharge  ENT: -ear pain, -nasal congestion, -sore throat  Cardiovascular: -chest pain, -palpitations, -lower extremity edema  Respiratory: -cough, -shortness of breath  Gastrointestinal: -abdominal pain, -nausea, -vomiting, -diarrhea, -constipation, -blood in stool  Genitourinary: -dysuria, -hematuria, -frequency  Musculoskeletal: -joint pain, -muscle pain  Skin: -rash, -lesion  Neurological: -headache, -dizziness, -numbness, -tingling  Psychiatric: -anxiety, -depression, -sleep difficulty       Physical Exam    Vitals: Weight: 224 lbs. Heart rate: 65 bpm.  General: No acute distress. Well-developed. Well-nourished.  Eyes: EOMI. Sclerae anicteric.  HENT: Normocephalic. Atraumatic. Nares patent. Moist oral mucosa.  Ears: Bilateral TMs clear. Bilateral EACs clear.  Cardiovascular: Regular rate. Regular rhythm. No murmurs. No rubs. No gallops. Normal S1, S2.  Respiratory: Normal respiratory effort. Clear to auscultation bilaterally. No rales. No rhonchi. No wheezing.  Abdomen: Soft. Non-tender. Non-distended. Normoactive bowel sounds.  Musculoskeletal: No  obvious deformity.  Extremities: No lower extremity edema.  Neurological: Alert & oriented x3. No slurred speech. Normal gait.  Psychiatric: Normal mood. Normal affect. Good insight. Good judgment.  Skin: Warm. Dry. No rash.       Assessment & Plan    IMPRESSION:  - Assessed cholesterol levels and considered treatment options  - Evaluated recent neck surgery outcomes and ongoing back pain management  - Reviewed liver fiber scan results showing fatty liver  - Monitored pre-diabetic status (A1c 6.0) - no immediate intervention required  - Noted low resting heart rate (40s-50s) - asymptomatic, no action  needed    HYPERCHOLESTEROLEMIA:  - Discussed benefits of statins for cholesterol management and potential side effects like muscle aches.  - Discussed benefits of fish oil and olive oil for heart health.  - Started Nexlizet once daily.  Consider Zetia and Lopid if insurance won't cover  - Patient can consider adding fish oil or krill oil supplements to diet.  - Recommend cooking with olive oil.    ABNORMAL GLUCOSE:  - Explained the relationship between carbohydrate intake and glucose levels.  - Patient to maintain current diet low in carbohydrates.    DIETARY COUNSELING:  - Patient to continue regular exercise routine at the gym.  - Patient to maintain current diet low in carbohydrates.  - Recommend cooking with olive oil.  - Patient can consider adding fish oil or krill oil supplements to diet.    MEDICATION MANAGEMENT:  - Discontinued daily aspirin (Ecotrin) as it is not necessary without known blockages.  - Continued Mycenax Pro.    FOLLOW-UP:  - Follow up in 6 months.  - Next colorectal screening (Cologuard) due in 2025.

## 2025-03-24 ENCOUNTER — E-VISIT (OUTPATIENT)
Dept: FAMILY MEDICINE | Facility: CLINIC | Age: 70
End: 2025-03-24
Payer: MEDICARE

## 2025-03-24 DIAGNOSIS — L23.7 POISON IVY: Primary | ICD-10-CM

## 2025-03-24 DIAGNOSIS — Z00.00 ENCOUNTER FOR MEDICARE ANNUAL WELLNESS EXAM: ICD-10-CM

## 2025-03-25 ENCOUNTER — TELEPHONE (OUTPATIENT)
Dept: FAMILY MEDICINE | Facility: CLINIC | Age: 70
End: 2025-03-25
Payer: MEDICARE

## 2025-03-25 DIAGNOSIS — R73.09 ELEVATED GLUCOSE: ICD-10-CM

## 2025-03-25 DIAGNOSIS — Z12.11 COLON CANCER SCREENING: Primary | ICD-10-CM

## 2025-03-25 DIAGNOSIS — I10 ESSENTIAL HYPERTENSION: ICD-10-CM

## 2025-03-25 DIAGNOSIS — Z12.5 SCREENING FOR PROSTATE CANCER: ICD-10-CM

## 2025-03-25 DIAGNOSIS — E78.2 MIXED HYPERLIPIDEMIA: ICD-10-CM

## 2025-03-25 NOTE — TELEPHONE ENCOUNTER
----- Message from Yue sent at 3/24/2025  4:24 PM CDT -----  Contact: pt  Leonardo ALYSIA TrishadavidDanielle: [id number]: 1955PCP: Saud Coles.Home Phone      [phone] Phone      Not on file.Mobile          [phone]: pt left a message on our machine stating he was returning a call from Dr. Villavicencio's office [phone]

## 2025-03-26 VITALS — HEART RATE: 74 BPM | SYSTOLIC BLOOD PRESSURE: 125 MMHG | DIASTOLIC BLOOD PRESSURE: 67 MMHG

## 2025-03-26 RX ORDER — TRIAMCINOLONE ACETONIDE 1 MG/G
CREAM TOPICAL 2 TIMES DAILY
Qty: 45 G | Refills: 5 | Status: SHIPPED | OUTPATIENT
Start: 2025-03-26

## 2025-03-26 RX ORDER — PREDNISONE 20 MG/1
20 TABLET ORAL 2 TIMES DAILY
Qty: 8 TABLET | Refills: 0 | Status: SHIPPED | OUTPATIENT
Start: 2025-03-26

## 2025-03-26 NOTE — PROGRESS NOTES
Patient ID: Leonardo Pisano is a 69 y.o. male.    Chief Complaint: General Illness (Entered automatically based on patient selection in [employer].)    The patient initiated a request through [employer] on 3/24/2025 for evaluation and management with a chief complaint of General Illness (Entered automatically based on patient selection in [employer].)     I evaluated the questionnaire submission on 3/26/25.    [facility] Evisit [facility]-Common Problems    3/25/2025 11:10 AM CDT - Filed by Patient   What do you need help with? Other Concern   Do you agree to participate in an E-Visit? Yes   If you have any of the following symptoms, please go to the nearest emergency room or call 911: I acknowledge   What is the main issue you would like addressed today? poison ivy or poison oak   Please describe your symptoms. itching and redness   Where is your problem located? front of neck, both arms and some on both thighs   On a scale of 1-10, where 10 is the worst you can imagine, how severe are your symptoms? (range: 1 - 10) 5   Have you had these symptoms before? No   How long have you had these symptoms? About a week   What helps with your symptoms? hot water   What makes your symptoms feel worse? scratching   Are your symptoms related to a condition you currently have? Yes   What condition do you currently have? itching and redness   When were you last seen for this condition?    Provide any additional information you feel is important. I have tried calamine lotion, Isopropyl Alchhol, Hydrogen Peroxide, Cotorzone 10 but none of these help very much   Please attach any relevant images or files    Are you able to take your vital signs? Yes   Systolic Blood Pressure: 125   Diastolic Blood Pressure: 67   Weight: 219   Height: 74   Pulse: 49   Temperature:    Respiration rate:    Pulse Oxygen: 98         Encounter Diagnosis   Name Primary?    Poison ivy Yes        No orders of the defined types were placed in this encounter.      Medications Ordered This Encounter   Medications    predniSONE (DELTASONE) 20 MG tablet     Sig: Take 1 tablet (20 mg total) by mouth 2 (two) times daily.     Dispense:  8 tablet     Refill:  0    triamcinolone acetonide 0.1% (KENALOG) 0.1 % cream     Sig: Apply topically 2 (two) times daily.     Dispense:  45 g     Refill:  5        No follow-ups on file.      E-Visit Time Tracking:

## 2025-03-31 ENCOUNTER — OFFICE VISIT (OUTPATIENT)
Dept: PAIN MEDICINE | Facility: CLINIC | Age: 70
End: 2025-03-31
Payer: MEDICARE

## 2025-03-31 ENCOUNTER — PATIENT MESSAGE (OUTPATIENT)
Dept: PAIN MEDICINE | Facility: OTHER | Age: 70
End: 2025-03-31
Payer: MEDICARE

## 2025-03-31 VITALS
TEMPERATURE: 99 F | HEART RATE: 61 BPM | RESPIRATION RATE: 18 BRPM | OXYGEN SATURATION: 98 % | DIASTOLIC BLOOD PRESSURE: 62 MMHG | WEIGHT: 223.13 LBS | SYSTOLIC BLOOD PRESSURE: 111 MMHG | BODY MASS INDEX: 28.65 KG/M2

## 2025-03-31 DIAGNOSIS — G89.29 OTHER CHRONIC PAIN: ICD-10-CM

## 2025-03-31 DIAGNOSIS — M79.18 MYOFASCIAL PAIN: ICD-10-CM

## 2025-03-31 DIAGNOSIS — M53.3 SACROILIAC JOINT PAIN: Primary | ICD-10-CM

## 2025-03-31 DIAGNOSIS — M47.816 LUMBAR SPONDYLOSIS: ICD-10-CM

## 2025-03-31 DIAGNOSIS — M48.061 SPINAL STENOSIS OF LUMBAR REGION WITHOUT NEUROGENIC CLAUDICATION: ICD-10-CM

## 2025-03-31 DIAGNOSIS — M79.18 PIRIFORMIS MUSCLE PAIN: ICD-10-CM

## 2025-03-31 DIAGNOSIS — M47.816 FACET ARTHROPATHY, LUMBAR: ICD-10-CM

## 2025-03-31 DIAGNOSIS — M51.360 DEGENERATION OF INTERVERTEBRAL DISC OF LUMBAR REGION WITH DISCOGENIC BACK PAIN: ICD-10-CM

## 2025-03-31 PROCEDURE — 3008F BODY MASS INDEX DOCD: CPT | Mod: CPTII,S$GLB,,

## 2025-03-31 PROCEDURE — 1159F MED LIST DOCD IN RCRD: CPT | Mod: CPTII,S$GLB,,

## 2025-03-31 PROCEDURE — 1101F PT FALLS ASSESS-DOCD LE1/YR: CPT | Mod: CPTII,S$GLB,,

## 2025-03-31 PROCEDURE — 99999 PR PBB SHADOW E&M-EST. PATIENT-LVL III: CPT | Mod: PBBFAC,,,

## 2025-03-31 PROCEDURE — 3074F SYST BP LT 130 MM HG: CPT | Mod: CPTII,S$GLB,,

## 2025-03-31 PROCEDURE — 3288F FALL RISK ASSESSMENT DOCD: CPT | Mod: CPTII,S$GLB,,

## 2025-03-31 PROCEDURE — 99214 OFFICE O/P EST MOD 30 MIN: CPT | Mod: S$GLB,,,

## 2025-03-31 PROCEDURE — 4010F ACE/ARB THERAPY RXD/TAKEN: CPT | Mod: CPTII,S$GLB,,

## 2025-03-31 PROCEDURE — 3078F DIAST BP <80 MM HG: CPT | Mod: CPTII,S$GLB,,

## 2025-03-31 PROCEDURE — 1160F RVW MEDS BY RX/DR IN RCRD: CPT | Mod: CPTII,S$GLB,,

## 2025-03-31 PROCEDURE — 1125F AMNT PAIN NOTED PAIN PRSNT: CPT | Mod: CPTII,S$GLB,,

## 2025-03-31 NOTE — PROGRESS NOTES
Interventional Pain Management - Established Visit  Follow-Up     PCP: Saud Coles MD    REFERRING PHYSICIAN: No ref. provider found (Neurosurgery)    CHIEF COMPLAINT: low back pain    Original HISTORY OF PRESENT ILLNESS: Leonardo Pisano presents to the clinic for the evaluation of the above pain. The pain started 20+ years ago    Original Pain Description:  The pain is located in the low back and is axial in nature.  He does endorsing radiating pain to bilateral posterior thigh and stops at the knee  The pain is described as sharp and tight band.   Exacerbating factors: Sitting, Standing, Laying, Morning, and Getting out of bed/chair.   Mitigating factors physical therapy and rest.   Symptoms interfere with daily activity and sleeping.   The patient feels like symptoms have been worsening.  Patient reports no distance restriction with walking  Patient denies night fever/night sweats, urinary incontinence, and bowel incontinence.  Occupation: retired        Original PAIN SCORES:  Best: Pain is 1  Worst: Pain is 10  Current: Pain is 2        3/31/2025     2:28 PM   Last 3 PDI Scores   Pain Disability Index (PDI) 34       INTERVAL HISTORY: (Newest visit at the bottom)   Interval History (10/22/2024):   Leonardo Pisano returns to clinic for follow-up after Caudal RAFA on 10/8/2024. He reports limited pain relief. He continues with left lower back/buttock pain. The pain is worse with walking and getting out of a chair. He continues Tylenol with some relief. He denies any perceived side effects. He denies recent health changes. He denies recent falls or trauma. He denies new onset fever/night sweats, urinary incontinence, bowel incontinence, significant weight changes, significant motor weakness or changes, or loss of sensations. His pain today is 3/10.      Interval History (12/4/2024):  Leonardo Pisano returns to clinic for follow-up after left SI joint injection on 11/12/2024. He reports 90% relief and  improved quality of life. He has noticed improved sleep. He continues extra strength tylenol with some relief. He denies any perceived side effects. He continues exercise and HEP daily. He denies recent health changes. He denies recent falls or trauma. He denies new onset fever/night sweats, urinary incontinence, bowel incontinence, significant weight changes, significant motor weakness or changes, or loss of sensations. His pain today is 2/10.     Interval History 3/31/2025:  Leonardo Pisano returns to clinic for follow-up of left lower back/buttock pain. He has had left SI joint injections which provided 90% relief for 4.5 months. He would like to repeat this injection at this time. He continues exercise and HEP daily. He notices good relief with daily exercise. His pain has been worse at the end of the day. He takes Advil with good benefit. He denies recent health changes. He denies recent falls or trauma. He denies new onset fever/night sweats, urinary incontinence, bowel incontinence, significant weight changes, significant motor weakness or changes, or loss of sensations. His pain today is 6/10.       6 weeks of Conservative therapy:  PT: completed PT (9/2024)  Chiro: none  HEP: daily exercise including PT exercises. (>6 weeks within the last 6 month)    Relevant Surgical Hx  6/12/2024 - ACD C5-6 and C6-7 (Anuj)      Treatments / Medications: (Ice/Heat/NSAIDS/APAP/etc):  Tylenol      Blood thinners: none      Interventional Pain Procedures: (Previous injections)  Pain intervention 20 years ago that was effective for 1 year  10/8/2024 - Caudal RAFA - 80% relief of lower back pain  11/12/2024 - Left SI joint injection - 90% relief x 4.5 months relief        Past Medical History:   Diagnosis Date    Back pain     Degenerative disc disease     Disorder of kidney and ureter     stone    Essential hypertension 04/03/2018    Hepatitis     Hyperlipidemia     Hypoxia 06/02/2021    Obesity     Pneumonia     Pneumonia  due to COVID-19 virus 06/03/2021    Trouble in sleeping     due to pain     Past Surgical History:   Procedure Laterality Date    EPIDURAL STEROID INJECTION N/A 10/8/2024    Procedure: CAUDAL RAFA;  Surgeon: Carlee Koch MD;  Location: [facility];  Service: Pain Management;  Laterality: N/A;  [phone]  2 WK F/U SONA    FUSION, SPINE, CERVICAL, ANTERIOR APPROACH N/A 6/12/2024    Procedure: C5-6,C6-7 ANTERIOR CERVICAL DISCECTOMY AND FUSION;  Surgeon: Umberto Leslie MD;  [facility];  Service: Neurosurgery;  Laterality: N/A;    INJECTION, SACROILIAC JOINT Left 11/12/2024    Procedure: INJECTION,SACROILIAC JOINT LEFT;  Surgeon: Carlee Koch MD;  Location: [facility];  Service: Pain Management;  Laterality: Left;  [phone]  2 WK F/U MARCELO    SHOULDER SURGERY  4/25/12    rt shoulder     Social History     Socioeconomic History    Marital status:     Number of children: 2   Occupational History     Employer: [employer]   Tobacco Use    Smoking status: Never    Smokeless tobacco: Never   Substance and Sexual Activity    Alcohol use: No    Drug use: Never     Social Drivers of Health     Financial Resource Strain: Low Risk  (8/7/2024)    Overall Financial Resource Strain (CARDIA)     Difficulty of Paying Living Expenses: Not hard at all   Food Insecurity: No Food Insecurity (8/7/2024)    Hunger Vital Sign     Worried About Running Out of Food in the Last Year: Never true     Ran Out of Food in the Last Year: Never true   Transportation Needs: No Transportation Needs (6/13/2024)    TRANSPORTATION NEEDS     Transportation : No   Physical Activity: Sufficiently Active (8/7/2024)    Exercise Vital Sign     Days of Exercise per Week: 4 days     Minutes of Exercise per Session: 40 min   Recent Concern: Physical Activity - Insufficiently Active (6/13/2024)    Exercise Vital Sign     Days of Exercise per Week: 7 days     Minutes of Exercise per Session: 20 min   Stress: No Stress  Concern Present (8/7/2024)    Citizen of [place] of Occupational Health - Occupational Stress Questionnaire     Feeling of Stress : Not at all   Housing Stability: Unknown (8/7/2024)    Housing Stability Vital Sign     Unable to Pay for Housing in the Last Year: No     Homeless in the Last Year: No     Family History   Problem Relation Name Age of Onset    Cancer Mother          liver    Heart disease Father          cabg    Hyperlipidemia Sister      Hyperlipidemia Sister      Diabetes Daughter      Cirrhosis Neg Hx         Review of patient's allergies indicates:   Allergen Reactions    No known allergies        Current Outpatient Medications   Medication Sig    bempedoic acid-ezetimibe 180-10 mg Tab Take 1 tablet by mouth once daily.    lisinopriL (PRINIVIL,ZESTRIL) 5 MG tablet TAKE 1 TABLET BY MOUTH EVERY DAY    predniSONE (DELTASONE) 20 MG tablet Take 1 tablet (20 mg total) by mouth 2 (two) times daily.    triamcinolone acetonide 0.1% (KENALOG) 0.1 % cream Apply topically 2 (two) times daily.     No current facility-administered medications for this visit.       ROS:  GENERAL: No fever. No chills. No fatigue. Denies weight loss. Denies weight gain.  HEENT: Denies headaches. Denies vision change. Denies eye pain. Denies double vision. Denies ear pain.   CV: Denies chest pain.   PULM: Denies of shortness of breath.  GI: Denies constipation. No diarrhea. No abdominal pain. Denies nausea. Denies vomiting. No blood in stool.  HEME: Denies bleeding problems.  : Denies urgency. No painful urination. No blood in urine.  MS: Denies joint stiffness. Denies joint swelling.  + back pain.  SKIN: Denies rash.   NEURO: Denies seizures. No weakness.  PSYCH:  Denies difficulty sleeping. No anxiety. Denies depression. No suicidal thoughts.       VITALS:   Vitals:    03/31/25 1427   BP: 111/62   Pulse: 61   Resp: 18   Temp: 98.8 °F (37.1 °C)   SpO2: 98%   Weight: 101.2 kg (223 lb 1.7 oz)   PainSc:   6       PHYSICAL EXAM:    GENERAL: Well appearing, in no acute distress, alert and oriented x3.  PSYCH:  Mood and affect appropriate.  SKIN: Skin color, texture, turgor normal, no rashes or lesions.  HEENT:  Normocephalic, atraumatic. Cranial nerves grossly intact.  PULM: No evidence of respiratory difficulty, symmetric chest rise.  GI:  Non-distended  BACK: Normal range of motion without pain on extension, flexion, facet loading. No pain to palpation over lumbar spinous process or facet joints bilaterally. SLR in the seated position negative to radicular pain. Positive axial loading test to the left. Positive tenderness over left SIJ with positive thigh and sacral thrust test, Positive FABERE,Ganselin and Yeoman's test to the left. Negative FADIR Negative FADIR. Pain to palpation to the left piriformis.   EXTREMITIES: No deformities, edema, or skin discoloration.  No atrophy is noted   NEURO: Sensation is equal and appropriate bilaterally. Bilateral upper and lower extremity strength is normal and symmetric. Bilateral upper and lower extremity coordination and muscle stretch reflexes are physiologic and symmetric. Plantar response are downgoing.   Straight leg raising in the seated position is Negative Bilaterally to radicular pain.   GAIT: Normal, ambulates with no assistive devices      LABS:      IMAGING:    LUMBAR X-RAY  Results for orders placed during the hospital encounter of 04/09/24    X-Ray Lumbar Complete Including Flex And Ext    Narrative  EXAMINATION:  XR LUMBAR SPINE 5 VIEW WITH FLEX AND EXT    CLINICAL HISTORY:  Scoliosis, unspecified    TECHNIQUE:  Five views of the lumbar spine plus flexion extension views were performed.    COMPARISON:  04/26/2019    FINDINGS:  There is slight lumbar levocurvature.  There is grade 1 anterolisthesis of L5 on S1 accompanied by bilateral L5 pars defects, with anterolisthesis mildly increased.  Mild multilevel degenerative changes are present.  No abnormal motion on flexion or extension is  demonstrated.  There is moderate L4-5 and L5-S1 facet arthropathy.    Impression  Grade 1 L5-S1 spondylolisthesis, mildly increased.      Electronically signed by: Brendan Smith MD  Date:    04/09/2024  Time:    15:49     LUMBAR MRI  Results for orders placed during the hospital encounter of 04/24/24    MRI Lumbar Spine Without Contrast    Narrative  EXAMINATION:  MRI LUMBAR SPINE WITHOUT CONTRAST    CLINICAL HISTORY:  Scoliosis; Scoliosis, unspecified    TECHNIQUE:  Multiplanar, multi-sequence MR images were acquired from the thoracolumbar junction to the sacrum without the administration of contrast.    COMPARISON:  MRI cervical and thoracic spine-04/24/2024    FINDINGS:  There is transitional anatomy present at the lumbosacral junction.  The transitional segment will be labeled S1 with a fully formed intervertebral discs noted between S1 and S2.  There is a grade I anterolisthesis of S1 on S2 as a result of bilateral S1 pars defects.  There is a grade I retrolisthesis of L4 on L5 with associated uncovering of the intervertebral disc.  No acute lumbar compression fracture or pathologic marrow replacement process.  There is degenerative disc desiccation at L2-L3 through S1-S2.    The conus medullaris terminates at L1.  The visualized lower thoracic spinal cord is normal in signal intensity with no evidence of cord edema, myelomalacia, or cord syrinx.  No epidural fluid collections or masses.  The paraspinous musculature is unremarkable.    There are multiple foci of T2 signal hyperintensity within the left kidney in the parapelvic region in the cortex at the upper pole of the left kidney, most likely cysts.    T12-L1: There is a broad central/right paracentral disc protrusion which effaces the anterior CSF sleeve.  There is moderate effacement of the thecal sac.  No neuroforaminal stenosis.    L1-L2: No disc protrusion or extrusion. No central canal stenosis or neuroforaminal stenosis.    L2-L3: There is a  broad disc bulge which effaces the anterior CSF sleeve.  No disc protrusion or extrusion. No central canal stenosis or neuroforaminal stenosis.    L3-L4: There is a broad disc bulge which effaces the anterior CSF sleeve and extends into both neural foramina.  There is an annular fissure present within the central portion of the intervertebral disc.  There is, at most, mild right neuroforaminal stenosis.  No left neuroforaminal stenosis.    L4-L5: There is a grade I retrolisthesis of L4 on L5 with associated uncovering of the intervertebral disc.  There is a broad disc bulge which extends into both neural foramina.  There is ligamentum flavum thickening and bilateral hypertrophic facet arthropathy, right greater than left.  There is moderate overall central canal stenosis and moderate bilateral neuroforaminal stenosis.    L5-S1: There is a broad disc bulge which extends into both neural foramina.  There is bilateral hypertrophic degenerative facet arthropathy and ligamentum flavum thickening.  There is severe central canal stenosis, moderate left neuroforaminal stenosis, and moderate-severe right neuroforaminal stenosis.  Please correlate clinically for symptoms referable to the L5 nerves.    S1-S2: There is a grade I anterolisthesis of S1 on S2 with associated uncovering of the intervertebral disc.  There is a superimposed broad disc bulge which extends into both neural foramina and there is bilateral hypertrophic facet arthropathy and ligamentum flavum thickening.  There is prominent epidural fat deposition observed in the spinal canal at S1-S2.  There is severe effacement of the thecal sac as a result of epidural fat deposition in the aforementioned degenerative changes.  There is severe bilateral neuroforaminal stenosis, left greater than right, which could produce symptoms referable to the S1 nerves.    Impression  1. Transitional anatomy at the lumbosacral junction.  Comparison was made to the cervical and  thoracic MRI of today's date to determine correct nomenclature in the lumbar spine.  Please note that this represents differing nomenclature than was described at the time of the patient's MRI performed 05/03/2019.  2. Advanced multilevel degenerative change of the lumbosacral spine resulting in multilevel central canal and neuroforaminal stenosis as detailed above.  3. Grade I anterolisthesis of S1 on S2 and grade I retrolisthesis of L4 on L5  4. Probable left renal cysts  5. Other findings as detailed above.      Electronically signed by: Bryan Ho MD  Date:    04/24/2024  Time:    11:26         ASSESSMENT: 69 y.o. year old male with pain, consistent with:    Encounter Diagnoses   Name Primary?    Sacroiliac joint pain Yes    Other chronic pain     Lumbar spondylosis     Degeneration of intervertebral disc of lumbar region with discogenic back pain     Spinal stenosis of lumbar region without neurogenic claudication     Facet arthropathy, lumbar          DISCUSSION: Leonardo Pisano is a retired gentleman who comes to us from Dr. Leslie with 20+ years of low back pain which is worst with prolonged sitting, standing, laying down. He denies walking limitations. Imaging shows bilateral S1 pars defects, multi-level DDD with endplate changes at L4/5 and L5/S1 and mod-severe canal stenosis at L5/S1. Exam shows pain reproduced with facet loading, milgrams. They would like to avoid extensive surgery.       PLAN:  Previous imaging was reviewed and discussed with the patient today.   Procedure order placed for repeat Left SI joint injection including left piriformis given 90% relief for 4.5 months.   Discussed minimally invasive treatment options.   Continue HEP  Continue OTC for pain control.   RTC 2-3 weeks after above procedure. Follow-up can be virtual.   Consider Intracept at L3 and L4    The above plan and management options were discussed at length with patient. Patient is in agreement with the above and  verbalized understanding.    Marilyn Carrasco NP  03/31/2025

## 2025-03-31 NOTE — H&P (VIEW-ONLY)
Interventional Pain Management - Established Visit  Follow-Up     PCP: Saud Coles MD    REFERRING PHYSICIAN: No ref. provider found (Neurosurgery)    CHIEF COMPLAINT: low back pain    Original HISTORY OF PRESENT ILLNESS: Leonardo Pisano presents to the clinic for the evaluation of the above pain. The pain started 20+ years ago    Original Pain Description:  The pain is located in the low back and is axial in nature.  He does endorsing radiating pain to bilateral posterior thigh and stops at the knee  The pain is described as sharp and tight band.   Exacerbating factors: Sitting, Standing, Laying, Morning, and Getting out of bed/chair.   Mitigating factors physical therapy and rest.   Symptoms interfere with daily activity and sleeping.   The patient feels like symptoms have been worsening.  Patient reports no distance restriction with walking  Patient denies night fever/night sweats, urinary incontinence, and bowel incontinence.  Occupation: retired        Original PAIN SCORES:  Best: Pain is 1  Worst: Pain is 10  Current: Pain is 2        3/31/2025     2:28 PM   Last 3 PDI Scores   Pain Disability Index (PDI) 34       INTERVAL HISTORY: (Newest visit at the bottom)   Interval History (10/22/2024):   Leonardo Pisano returns to clinic for follow-up after Caudal RAFA on 10/8/2024. He reports limited pain relief. He continues with left lower back/buttock pain. The pain is worse with walking and getting out of a chair. He continues Tylenol with some relief. He denies any perceived side effects. He denies recent health changes. He denies recent falls or trauma. He denies new onset fever/night sweats, urinary incontinence, bowel incontinence, significant weight changes, significant motor weakness or changes, or loss of sensations. His pain today is 3/10.      Interval History (12/4/2024):  Leonardo Pisano returns to clinic for follow-up after left SI joint injection on 11/12/2024. He reports 90% relief and  improved quality of life. He has noticed improved sleep. He continues extra strength tylenol with some relief. He denies any perceived side effects. He continues exercise and HEP daily. He denies recent health changes. He denies recent falls or trauma. He denies new onset fever/night sweats, urinary incontinence, bowel incontinence, significant weight changes, significant motor weakness or changes, or loss of sensations. His pain today is 2/10.     Interval History 3/31/2025:  Leonardo Pisano returns to clinic for follow-up of left lower back/buttock pain. He has had left SI joint injections which provided 90% relief for 4.5 months. He would like to repeat this injection at this time. He continues exercise and HEP daily. He notices good relief with daily exercise. His pain has been worse at the end of the day. He takes Advil with good benefit. He denies recent health changes. He denies recent falls or trauma. He denies new onset fever/night sweats, urinary incontinence, bowel incontinence, significant weight changes, significant motor weakness or changes, or loss of sensations. His pain today is 6/10.       6 weeks of Conservative therapy:  PT: completed PT (9/2024)  Chiro: none  HEP: daily exercise including PT exercises. (>6 weeks within the last 6 month)    Relevant Surgical Hx  6/12/2024 - ACD C5-6 and C6-7 (Anuj)      Treatments / Medications: (Ice/Heat/NSAIDS/APAP/etc):  Tylenol      Blood thinners: none      Interventional Pain Procedures: (Previous injections)  Pain intervention 20 years ago that was effective for 1 year  10/8/2024 - Caudal RAFA - 80% relief of lower back pain  11/12/2024 - Left SI joint injection - 90% relief x 4.5 months relief        Past Medical History:   Diagnosis Date    Back pain     Degenerative disc disease     Disorder of kidney and ureter     stone    Essential hypertension 04/03/2018    Hepatitis     Hyperlipidemia     Hypoxia 06/02/2021    Obesity     Pneumonia     Pneumonia  due to COVID-19 virus 06/03/2021    Trouble in sleeping     due to pain     Past Surgical History:   Procedure Laterality Date    EPIDURAL STEROID INJECTION N/A 10/8/2024    Procedure: CAUDAL RAFA;  Surgeon: Carlee Koch MD;  Location: [facility] PAIN MGT;  Service: Pain Management;  Laterality: N/A;  [phone]  2 WK F/U SONA    FUSION, SPINE, CERVICAL, ANTERIOR APPROACH N/A 6/12/2024    Procedure: C5-6,C6-7 ANTERIOR CERVICAL DISCECTOMY AND FUSION;  Surgeon: Umberto Leslie MD;  [facility];  Service: Neurosurgery;  Laterality: N/A;    INJECTION, SACROILIAC JOINT Left 11/12/2024    Procedure: INJECTION,SACROILIAC JOINT LEFT;  Surgeon: Carlee Koch MD;  Location: [facility] PAIN MGT;  Service: Pain Management;  Laterality: Left;  [phone]  2 WK F/U MARCELO    SHOULDER SURGERY  4/25/12    rt shoulder     Social History     Socioeconomic History    Marital status:     Number of children: 2   Occupational History     Employer: [employer]   Tobacco Use    Smoking status: Never    Smokeless tobacco: Never   Substance and Sexual Activity    Alcohol use: No    Drug use: Never     Social Drivers of Health     Financial Resource Strain: Low Risk  (8/7/2024)    Overall Financial Resource Strain (CARDIA)     Difficulty of Paying Living Expenses: Not hard at all   Food Insecurity: No Food Insecurity (8/7/2024)    Hunger Vital Sign     Worried About Running Out of Food in the Last Year: Never true     Ran Out of Food in the Last Year: Never true   Transportation Needs: No Transportation Needs (6/13/2024)    TRANSPORTATION NEEDS     Transportation : No   Physical Activity: Sufficiently Active (8/7/2024)    Exercise Vital Sign     Days of Exercise per Week: 4 days     Minutes of Exercise per Session: 40 min   Recent Concern: Physical Activity - Insufficiently Active (6/13/2024)    Exercise Vital Sign     Days of Exercise per Week: 7 days     Minutes of Exercise per Session: 20 min   Stress: No Stress  Concern Present (8/7/2024)    [employer] [place] [employer] - Occupational Stress Questionnaire     Feeling of Stress : Not at all   Housing Stability: Unknown (8/7/2024)    Housing Stability Vital Sign     Unable to Pay for Housing in the Last Year: No     Homeless in the Last Year: No     Family History   Problem Relation Name Age of Onset    Cancer Mother          liver    Heart disease Father          cabg    Hyperlipidemia Sister      Hyperlipidemia Sister      Diabetes Daughter      Cirrhosis Neg Hx         Review of patient's allergies indicates:   Allergen Reactions    No known allergies        Current Outpatient Medications   Medication Sig    bempedoic acid-ezetimibe 180-10 mg Tab Take 1 tablet by mouth once daily.    lisinopriL (PRINIVIL,ZESTRIL) 5 MG tablet TAKE 1 TABLET BY MOUTH EVERY DAY    predniSONE (DELTASONE) 20 MG tablet Take 1 tablet (20 mg total) by mouth 2 (two) times daily.    triamcinolone acetonide 0.1% (KENALOG) 0.1 % cream Apply topically 2 (two) times daily.     No current facility-administered medications for this visit.       ROS:  GENERAL: No fever. No chills. No fatigue. Denies weight loss. Denies weight gain.  HEENT: Denies headaches. Denies vision change. Denies eye pain. Denies double vision. Denies ear pain.   CV: Denies chest pain.   PULM: Denies of shortness of breath.  GI: Denies constipation. No diarrhea. No abdominal pain. Denies nausea. Denies vomiting. No blood in stool.  HEME: Denies bleeding problems.  : Denies urgency. No painful urination. No blood in urine.  MS: Denies joint stiffness. Denies joint swelling.  + back pain.  SKIN: Denies rash.   NEURO: Denies seizures. No weakness.  PSYCH:  Denies difficulty sleeping. No anxiety. Denies depression. No suicidal thoughts.       VITALS:   Vitals:    03/31/25 1427   BP: 111/62   Pulse: 61   Resp: 18   Temp: 98.8 °F (37.1 °C)   SpO2: 98%   Weight: 101.2 kg (223 lb 1.7 oz)   PainSc:   6       PHYSICAL EXAM:    GENERAL: Well appearing, in no acute distress, alert and oriented x3.  PSYCH:  Mood and affect appropriate.  SKIN: Skin color, texture, turgor normal, no rashes or lesions.  HEENT:  Normocephalic, atraumatic. Cranial nerves grossly intact.  PULM: No evidence of respiratory difficulty, symmetric chest rise.  GI:  Non-distended  BACK: Normal range of motion without pain on extension, flexion, facet loading. No pain to palpation over lumbar spinous process or facet joints bilaterally. SLR in the seated position negative to radicular pain. Positive axial loading test to the left. Positive tenderness over left SIJ with positive thigh and sacral thrust test, Positive FABERE,Ganselin and Yeoman's test to the left. Negative FADIR Negative FADIR. Pain to palpation to the left piriformis.   EXTREMITIES: No deformities, edema, or skin discoloration.  No atrophy is noted   NEURO: Sensation is equal and appropriate bilaterally. Bilateral upper and lower extremity strength is normal and symmetric. Bilateral upper and lower extremity coordination and muscle stretch reflexes are physiologic and symmetric. Plantar response are downgoing.   Straight leg raising in the seated position is Negative Bilaterally to radicular pain.   GAIT: Normal, ambulates with no assistive devices      LABS:      IMAGING:    LUMBAR X-RAY  Results for orders placed during the hospital encounter of 04/09/24    X-Ray Lumbar Complete Including Flex And Ext    Narrative  EXAMINATION:  XR LUMBAR SPINE 5 VIEW WITH FLEX AND EXT    CLINICAL HISTORY:  Scoliosis, unspecified    TECHNIQUE:  Five views of the lumbar spine plus flexion extension views were performed.    COMPARISON:  04/26/2019    FINDINGS:  There is slight lumbar levocurvature.  There is grade 1 anterolisthesis of L5 on S1 accompanied by bilateral L5 pars defects, with anterolisthesis mildly increased.  Mild multilevel degenerative changes are present.  No abnormal motion on flexion or extension is  demonstrated.  There is moderate L4-5 and L5-S1 facet arthropathy.    Impression  Grade 1 L5-S1 spondylolisthesis, mildly increased.      Electronically signed by: Brendan Smith MD  Date:    04/09/2024  Time:    15:49     LUMBAR MRI  Results for orders placed during the hospital encounter of 04/24/24    MRI Lumbar Spine Without Contrast    Narrative  EXAMINATION:  MRI LUMBAR SPINE WITHOUT CONTRAST    CLINICAL HISTORY:  Scoliosis; Scoliosis, unspecified    TECHNIQUE:  Multiplanar, multi-sequence MR images were acquired from the thoracolumbar junction to the sacrum without the administration of contrast.    COMPARISON:  MRI cervical and thoracic spine-04/24/2024    FINDINGS:  There is transitional anatomy present at the lumbosacral junction.  The transitional segment will be labeled S1 with a fully formed intervertebral discs noted between S1 and S2.  There is a grade I anterolisthesis of S1 on S2 as a result of bilateral S1 pars defects.  There is a grade I retrolisthesis of L4 on L5 with associated uncovering of the intervertebral disc.  No acute lumbar compression fracture or pathologic marrow replacement process.  There is degenerative disc desiccation at L2-L3 through S1-S2.    The conus medullaris terminates at L1.  The visualized lower thoracic spinal cord is normal in signal intensity with no evidence of cord edema, myelomalacia, or cord syrinx.  No epidural fluid collections or masses.  The paraspinous musculature is unremarkable.    There are multiple foci of T2 signal hyperintensity within the left kidney in the parapelvic region in the cortex at the upper pole of the left kidney, most likely cysts.    T12-L1: There is a broad central/right paracentral disc protrusion which effaces the anterior CSF sleeve.  There is moderate effacement of the thecal sac.  No neuroforaminal stenosis.    L1-L2: No disc protrusion or extrusion. No central canal stenosis or neuroforaminal stenosis.    L2-L3: There is a  broad disc bulge which effaces the anterior CSF sleeve.  No disc protrusion or extrusion. No central canal stenosis or neuroforaminal stenosis.    L3-L4: There is a broad disc bulge which effaces the anterior CSF sleeve and extends into both neural foramina.  There is an annular fissure present within the central portion of the intervertebral disc.  There is, at most, mild right neuroforaminal stenosis.  No left neuroforaminal stenosis.    L4-L5: There is a grade I retrolisthesis of L4 on L5 with associated uncovering of the intervertebral disc.  There is a broad disc bulge which extends into both neural foramina.  There is ligamentum flavum thickening and bilateral hypertrophic facet arthropathy, right greater than left.  There is moderate overall central canal stenosis and moderate bilateral neuroforaminal stenosis.    L5-S1: There is a broad disc bulge which extends into both neural foramina.  There is bilateral hypertrophic degenerative facet arthropathy and ligamentum flavum thickening.  There is severe central canal stenosis, moderate left neuroforaminal stenosis, and moderate-severe right neuroforaminal stenosis.  Please correlate clinically for symptoms referable to the L5 nerves.    S1-S2: There is a grade I anterolisthesis of S1 on S2 with associated uncovering of the intervertebral disc.  There is a superimposed broad disc bulge which extends into both neural foramina and there is bilateral hypertrophic facet arthropathy and ligamentum flavum thickening.  There is prominent epidural fat deposition observed in the spinal canal at S1-S2.  There is severe effacement of the thecal sac as a result of epidural fat deposition in the aforementioned degenerative changes.  There is severe bilateral neuroforaminal stenosis, left greater than right, which could produce symptoms referable to the S1 nerves.    Impression  1. Transitional anatomy at the lumbosacral junction.  Comparison was made to the cervical and  thoracic MRI of today's date to determine correct nomenclature in the lumbar spine.  Please note that this represents differing nomenclature than was described at the time of the patient's MRI performed 05/03/2019.  2. Advanced multilevel degenerative change of the lumbosacral spine resulting in multilevel central canal and neuroforaminal stenosis as detailed above.  3. Grade I anterolisthesis of S1 on S2 and grade I retrolisthesis of L4 on L5  4. Probable left renal cysts  5. Other findings as detailed above.      Electronically signed by: Bryan Ho MD  Date:    04/24/2024  Time:    11:26         ASSESSMENT: 69 y.o. year old male with pain, consistent with:    Encounter Diagnoses   Name Primary?    Sacroiliac joint pain Yes    Other chronic pain     Lumbar spondylosis     Degeneration of intervertebral disc of lumbar region with discogenic back pain     Spinal stenosis of lumbar region without neurogenic claudication     Facet arthropathy, lumbar          DISCUSSION: Leonardo Pisano is a retired gentleman who comes to us from Dr. Leslie with 20+ years of low back pain which is worst with prolonged sitting, standing, laying down. He denies walking limitations. Imaging shows bilateral S1 pars defects, multi-level DDD with endplate changes at L4/5 and L5/S1 and mod-severe canal stenosis at L5/S1. Exam shows pain reproduced with facet loading, milgrams. They would like to avoid extensive surgery.       PLAN:  Previous imaging was reviewed and discussed with the patient today.   Procedure order placed for repeat Left SI joint injection including left piriformis given 90% relief for 4.5 months.   Discussed minimally invasive treatment options.   Continue HEP  Continue OTC for pain control.   RTC 2-3 weeks after above procedure. Follow-up can be virtual.   Consider Intracept at L3 and L4    The above plan and management options were discussed at length with patient. Patient is in agreement with the above and  verbalized understanding.    Marilyn Carrasco NP  03/31/2025

## 2025-04-08 ENCOUNTER — PATIENT MESSAGE (OUTPATIENT)
Dept: OTOLARYNGOLOGY | Facility: CLINIC | Age: 70
End: 2025-04-08
Payer: MEDICARE

## 2025-04-14 ENCOUNTER — PATIENT MESSAGE (OUTPATIENT)
Dept: ADMINISTRATIVE | Facility: OTHER | Age: 70
End: 2025-04-14
Payer: MEDICARE

## 2025-04-29 ENCOUNTER — HOSPITAL ENCOUNTER (OUTPATIENT)
Facility: OTHER | Age: 70
Discharge: HOME | End: 2025-04-29
Attending: ANESTHESIOLOGY | Admitting: ANESTHESIOLOGY
Payer: MEDICARE

## 2025-04-29 VITALS
HEART RATE: 49 BPM | OXYGEN SATURATION: 98 % | RESPIRATION RATE: 16 BRPM | DIASTOLIC BLOOD PRESSURE: 67 MMHG | BODY MASS INDEX: 27.85 KG/M2 | HEIGHT: 74 IN | WEIGHT: 217 LBS | SYSTOLIC BLOOD PRESSURE: 142 MMHG | TEMPERATURE: 98 F

## 2025-04-29 DIAGNOSIS — G57.00 PIRIFORMIS SYNDROME, UNSPECIFIED LATERALITY: ICD-10-CM

## 2025-04-29 DIAGNOSIS — M53.3 SACROILIAC JOINT DYSFUNCTION: Primary | ICD-10-CM

## 2025-04-29 DIAGNOSIS — G89.29 CHRONIC PAIN: ICD-10-CM

## 2025-04-29 PROCEDURE — 25500020 PHARM REV CODE 255: Performed by: ANESTHESIOLOGY

## 2025-04-29 PROCEDURE — 27096 INJECT SACROILIAC JOINT: CPT | Mod: LT | Performed by: ANESTHESIOLOGY

## 2025-04-29 PROCEDURE — 27096 INJECT SACROILIAC JOINT: CPT | Mod: LT,,, | Performed by: ANESTHESIOLOGY

## 2025-04-29 PROCEDURE — 20552 NJX 1/MLT TRIGGER POINT 1/2: CPT | Mod: 59,,, | Performed by: ANESTHESIOLOGY

## 2025-04-29 PROCEDURE — 20552 NJX 1/MLT TRIGGER POINT 1/2: CPT | Mod: 59 | Performed by: ANESTHESIOLOGY

## 2025-04-29 PROCEDURE — 63600175 PHARM REV CODE 636 W HCPCS: Performed by: ANESTHESIOLOGY

## 2025-04-29 PROCEDURE — 77002 NEEDLE LOCALIZATION BY XRAY: CPT | Mod: 26,59,, | Performed by: ANESTHESIOLOGY

## 2025-04-29 RX ORDER — BUPIVACAINE HYDROCHLORIDE 2.5 MG/ML
INJECTION, SOLUTION EPIDURAL; INFILTRATION; INTRACAUDAL; PERINEURAL
Status: DISCONTINUED | OUTPATIENT
Start: 2025-04-29 | End: 2025-04-29 | Stop reason: HOSPADM

## 2025-04-29 RX ORDER — LIDOCAINE HYDROCHLORIDE 20 MG/ML
INJECTION, SOLUTION INFILTRATION; PERINEURAL
Status: DISCONTINUED | OUTPATIENT
Start: 2025-04-29 | End: 2025-04-29 | Stop reason: HOSPADM

## 2025-04-29 RX ORDER — SODIUM CHLORIDE 9 MG/ML
INJECTION, SOLUTION INTRAVENOUS CONTINUOUS
Status: DISCONTINUED | OUTPATIENT
Start: 2025-04-29 | End: 2025-04-29 | Stop reason: HOSPADM

## 2025-04-29 RX ORDER — TRIAMCINOLONE ACETONIDE 40 MG/ML
INJECTION, SUSPENSION INTRA-ARTICULAR; INTRAMUSCULAR
Status: DISCONTINUED | OUTPATIENT
Start: 2025-04-29 | End: 2025-04-29 | Stop reason: HOSPADM

## 2025-04-29 NOTE — OP NOTE
Sacroiliac Joint and Piriformis Muscle Injection under Fluoroscopic Guidance      Piriformis Muscle Injection under Fluoroscopic Guidance    The procedure, risks, benefits, and options were discussed with the patient. There are no contraindications to the procedure. The patent expressed understanding and agreed to the procedure. Informed written consent was obtained prior to the start of the procedure and can be found in the patient's chart.    PATIENT NAME: Leonardo Pisano   MRN: [id number]     DATE OF PROCEDURE: 04/29/2025    PROCEDURE: Left Piriformis Muscle Injection under Fluoroscopic Guidance    PRE-OP DIAGNOSIS: Sacroiliac joint pain [M53.3]  Sacroiliitis   Myofascial pain [M79.18] Myofascial Pain [M79.18]    POST-OP DIAGNOSIS: Same    PHYSICIAN: Mendel Morgan MD    ASSISTANTS: Osorio Garcia MD  [facility]      MEDICATIONS INJECTED: Preservative-free Kenalog 40mg with 3cc of Bupivacaine 0.25%     LOCAL ANESTHETIC INJECTED: Xylocaine 2%     SEDATION: None    ESTIMATED BLOOD LOSS: None    COMPLICATIONS: None    TECHNIQUE: Time-out was performed to identify the patient and procedure to be performed. With the patient laying in a prone position, the surgical area was prepped and draped in the usual sterile fashion using ChloraPrep and a fenestrated drape.The area overlying the right piriformis muscle was identified under fluoroscopy in the AP view. Skin anesthesia was achieved by injecting Lidocaine 2% over the injection sites. A 25 gauge,  3.5 inch spinal quinke needle was then advanced 3 cm inferior and 3 cm lateral to the inferior aspect of the SI joint. Once the piriformis muscle was thought to be encountered, Contrast dye  Omnipaque (300mg/mL) was injected to confirm placement and there was no vascular runoff. Confirmation of spread was identified within the piriformis muscle using AP fluoroscopic views. Then  4 mL of the medication mixture listed above was injected slowly. The  needles were removed and bleeding was nil. A sterile dressing was applied. No specimens collected. The patient tolerated the procedure well.         Sacroiliac Joint Injection under Fluoroscopic Guidance    The procedure, risks, benefits, and options were discussed with the patient. There are no contraindications to the procedure. The patent expressed understanding and agreed to the procedure. Informed written consent was obtained prior to the start of the procedure and can be found in the patient's chart.    PATIENT NAME: Leonardo Pisano   MRN: [id number]     DATE OF PROCEDURE: 04/29/2025    PROCEDURE: Left Sacroiliac Joint Injection under Fluoroscopic Guidance    PRE-OP DIAGNOSIS: Sacroiliac joint pain [M53.3]  Sacroiliitis   Myofascial pain [M79.18]    POST-OP DIAGNOSIS: Same    MEDICATIONS INJECTED: Preservative-free Kenalog 40mg with 3cc of Bupivacine 0.25%     COMPLICATIONS: None    TECHNIQUE: Time-out was performed to identify the patient and procedure to be performed. With the patient laying in a prone position, the surgical area was prepped and draped in the usual sterile fashion using ChloraPrep and a fenestrated drape. The sacroiliac joint was determined under fluoroscopy guidance. Skin anesthesia was achieved by injecting Lidocaine 2% over the injection site. The sacroiliac joint was  then approached with a 25 gauge, 3.5 inch spinal quinke needle that was introduced under fluoroscopic guidance in the AP and Lateral views. Once the needle tip was in the area of the joint, and there was no blood, contrast dye Omnipaque (300mg/mL) was injected to confirm placement and there was no vascular runoff. Fluoroscopic imaging in the AP and lateral views revealed a clear outline of the joint space. 4 mL of the medication mixture listed above was injected slowly intraarticular and amandeep-articular. Displacement of the radio opaque contrast after injection of the medication confirmed that the medication went into the area of  the joint. The needles were removed and bleeding was nil.  A sterile dressing was applied. No specimens collected. The patient tolerated the procedure well.       The patient was monitored after the procedure in the recovery area. They were given post-procedure and discharge instructions to follow at home. The patient was discharged in a stable condition.    Osorio Garcia MD     I reviewed and edited the fellow's note. I conducted my own interview and physical examination. I agree with the findings. I was present and supervising all critical portions of the procedure.   Mendel Morgan MD

## 2025-04-29 NOTE — DISCHARGE SUMMARY
Discharge Note  Short Stay      SUMMARY     Admit Date: 4/29/2025    Attending Physician: Mendel Morgan MD    Discharge Physician: Mendel Morgan MD      Discharge Date: 4/29/2025 8:51 AM    Procedure(s) (LRB):  INJECTION,SACROILIAC JOINT LEFT AND LEFT PIRIFORMIS *RASHAWN PT* (Left)    Final Diagnosis: Sacroiliac joint pain [M53.3]  Myofascial pain [M79.18]    Disposition: Home or self care    Patient Instructions:   Current Discharge Medication List        CONTINUE these medications which have NOT CHANGED    Details   bempedoic acid-ezetimibe 180-10 mg Tab Take 1 tablet by mouth once daily.  Qty: 30 tablet, Refills: 5    Associated Diagnoses: Mixed hyperlipidemia; Statin myopathy      lisinopriL (PRINIVIL,ZESTRIL) 5 MG tablet TAKE 1 TABLET BY MOUTH EVERY DAY  Qty: 90 tablet, Refills: 1    Comments: .  Associated Diagnoses: Essential hypertension      predniSONE (DELTASONE) 20 MG tablet Take 1 tablet (20 mg total) by mouth 2 (two) times daily.  Qty: 8 tablet, Refills: 0    Associated Diagnoses: Poison ivy      triamcinolone acetonide 0.1% (KENALOG) 0.1 % cream Apply topically 2 (two) times daily.  Qty: 45 g, Refills: 5    Associated Diagnoses: Poison ivy                 Discharge Diagnosis: Sacroiliac joint pain [M53.3]  Myofascial pain [M79.18]  Condition on Discharge: Stable with no complications to procedure   Diet on Discharge: Same as before.  Activity: as per instruction sheet.  Discharge to: Home with a responsible adult.  Follow up: 2-4 weeks       Please call my office or pager at [phone] if experienced any weakness or loss of sensation, fever > 101.5, pain uncontrolled with oral medications, persistent nausea/vomiting/or diarrhea, redness or drainage from the incisions, or any other worrisome concerns. If physician on call was not reached or could not communicate with our office for any reason please go to the nearest emergency department     Osorio Garcia MD

## 2025-04-29 NOTE — DISCHARGE INSTRUCTIONS

## 2025-05-19 ENCOUNTER — OFFICE VISIT (OUTPATIENT)
Dept: PAIN MEDICINE | Facility: CLINIC | Age: 70
End: 2025-05-19
Payer: MEDICARE

## 2025-05-19 VITALS
TEMPERATURE: 99 F | BODY MASS INDEX: 27.51 KG/M2 | WEIGHT: 214.31 LBS | SYSTOLIC BLOOD PRESSURE: 115 MMHG | HEART RATE: 51 BPM | OXYGEN SATURATION: 99 % | DIASTOLIC BLOOD PRESSURE: 62 MMHG

## 2025-05-19 DIAGNOSIS — G57.00 PIRIFORMIS SYNDROME, UNSPECIFIED LATERALITY: ICD-10-CM

## 2025-05-19 DIAGNOSIS — M53.3 SACROILIAC JOINT DYSFUNCTION: Primary | ICD-10-CM

## 2025-05-19 DIAGNOSIS — M53.3 SACROILIAC JOINT PAIN: ICD-10-CM

## 2025-05-19 DIAGNOSIS — M47.816 LUMBAR SPONDYLOSIS: ICD-10-CM

## 2025-05-19 DIAGNOSIS — M79.18 MYOFASCIAL PAIN: ICD-10-CM

## 2025-05-19 DIAGNOSIS — M48.061 SPINAL STENOSIS OF LUMBAR REGION WITHOUT NEUROGENIC CLAUDICATION: ICD-10-CM

## 2025-05-19 DIAGNOSIS — G89.29 OTHER CHRONIC PAIN: ICD-10-CM

## 2025-05-19 DIAGNOSIS — M54.17 LUMBOSACRAL RADICULOPATHY: ICD-10-CM

## 2025-05-19 DIAGNOSIS — M54.16 LUMBAR RADICULOPATHY: ICD-10-CM

## 2025-05-19 DIAGNOSIS — M51.362 DEGENERATION OF INTERVERTEBRAL DISC OF LUMBAR REGION WITH DISCOGENIC BACK PAIN AND LOWER EXTREMITY PAIN: ICD-10-CM

## 2025-05-19 PROCEDURE — 1159F MED LIST DOCD IN RCRD: CPT | Mod: CPTII,S$GLB,,

## 2025-05-19 PROCEDURE — 99214 OFFICE O/P EST MOD 30 MIN: CPT | Mod: S$GLB,,,

## 2025-05-19 PROCEDURE — 3078F DIAST BP <80 MM HG: CPT | Mod: CPTII,S$GLB,,

## 2025-05-19 PROCEDURE — 1125F AMNT PAIN NOTED PAIN PRSNT: CPT | Mod: CPTII,S$GLB,,

## 2025-05-19 PROCEDURE — 4010F ACE/ARB THERAPY RXD/TAKEN: CPT | Mod: CPTII,S$GLB,,

## 2025-05-19 PROCEDURE — 99999 PR PBB SHADOW E&M-EST. PATIENT-LVL III: CPT | Mod: PBBFAC,,,

## 2025-05-19 PROCEDURE — 1160F RVW MEDS BY RX/DR IN RCRD: CPT | Mod: CPTII,S$GLB,,

## 2025-05-19 PROCEDURE — 3288F FALL RISK ASSESSMENT DOCD: CPT | Mod: CPTII,S$GLB,,

## 2025-05-19 PROCEDURE — 3008F BODY MASS INDEX DOCD: CPT | Mod: CPTII,S$GLB,,

## 2025-05-19 PROCEDURE — 3074F SYST BP LT 130 MM HG: CPT | Mod: CPTII,S$GLB,,

## 2025-05-19 PROCEDURE — 1101F PT FALLS ASSESS-DOCD LE1/YR: CPT | Mod: CPTII,S$GLB,,

## 2025-05-19 NOTE — PROGRESS NOTES
Interventional Pain Management - Established Visit  Follow-Up     PCP: Saud Coles MD    REFERRING PHYSICIAN: Self, Aaareferral (Neurosurgery)    CHIEF COMPLAINT: low back pain    Original HISTORY OF PRESENT ILLNESS: Leonardo Pisano presents to the clinic for the evaluation of the above pain. The pain started 20+ years ago    Original Pain Description:  The pain is located in the low back and is axial in nature.  He does endorsing radiating pain to bilateral posterior thigh and stops at the knee  The pain is described as sharp and tight band.   Exacerbating factors: Sitting, Standing, Laying, Morning, and Getting out of bed/chair.   Mitigating factors physical therapy and rest.   Symptoms interfere with daily activity and sleeping.   The patient feels like symptoms have been worsening.  Patient reports no distance restriction with walking  Patient denies night fever/night sweats, urinary incontinence, and bowel incontinence.  Occupation: retired        Original PAIN SCORES:  Best: Pain is 1  Worst: Pain is 10  Current: Pain is 2        5/19/2025     9:28 AM   Last 3 PDI Scores   Pain Disability Index (PDI) 0       INTERVAL HISTORY: (Newest visit at the bottom)   Interval History (10/22/2024):   Leonardo Pisano returns to clinic for follow-up after Caudal RAFA on 10/8/2024. He reports limited pain relief. He continues with left lower back/buttock pain. The pain is worse with walking and getting out of a chair. He continues Tylenol with some relief. He denies any perceived side effects. He denies recent health changes. He denies recent falls or trauma. He denies new onset fever/night sweats, urinary incontinence, bowel incontinence, significant weight changes, significant motor weakness or changes, or loss of sensations. His pain today is 3/10.      Interval History (12/4/2024):  Leonardo Pisano returns to clinic for follow-up after left SI joint injection on 11/12/2024. He reports 90% relief and improved  quality of life. He has noticed improved sleep. He continues extra strength tylenol with some relief. He denies any perceived side effects. He continues exercise and HEP daily. He denies recent health changes. He denies recent falls or trauma. He denies new onset fever/night sweats, urinary incontinence, bowel incontinence, significant weight changes, significant motor weakness or changes, or loss of sensations. His pain today is 2/10.     Interval History 3/31/2025:  Leonardo Pisano returns to clinic for follow-up of left lower back/buttock pain. He has had left SI joint injections which provided 90% relief for 4.5 months. He would like to repeat this injection at this time. He continues exercise and HEP daily. He notices good relief with daily exercise. His pain has been worse at the end of the day. He takes Advil with good benefit. He denies recent health changes. He denies recent falls or trauma. He denies new onset fever/night sweats, urinary incontinence, bowel incontinence, significant weight changes, significant motor weakness or changes, or loss of sensations. His pain today is 6/10.     Interval History 5/19/2025:  Leonardo Pisano returns for follow-up after left SI joint and left piriformis injection on 4/29/2025. He states he has noticed increased lower back pain with radiation into the left leg in an L4/L5 distribution. He had 80% benefit from prior Caudal RAFA for 5-6 months. He continues advil without any perceived side effects. He denies recent health changes. He denies recent falls or trauma. He denies new onset fever/night sweats, urinary incontinence, bowel incontinence, significant weight changes, significant motor weakness or changes, or loss of sensations. His pain today is 3/10.  At worst, the back pain is 6/10.       6 weeks of Conservative therapy:  PT: completed PT (9/2024)  Chiro: none  HEP: daily exercise including PT exercises. (>6 weeks within the last 6 month)    Relevant Surgical  Hx  6/12/2024 - ACD C5-6 and C6-7 (Anuj)      Treatments / Medications: (Ice/Heat/NSAIDS/APAP/etc):  Tylenol      Blood thinners: none      Interventional Pain Procedures: (Previous injections)  Pain intervention 20 years ago that was effective for 1 year  10/8/2024 - Caudal RAFA - 80% relief of lower back pain x 5-6 months  11/12/2024 - Left SI joint injection - 90% relief x 4.5 months relief  4/29/2025 - Left SI joint and Left piriformis        Past Medical History:   Diagnosis Date    Back pain     Degenerative disc disease     Disorder of kidney and ureter     stone    Essential hypertension 04/03/2018    Hepatitis     Hyperlipidemia     Hypoxia 06/02/2021    Obesity     Pneumonia     Pneumonia due to COVID-19 virus 06/03/2021    Trouble in sleeping     due to pain     Past Surgical History:   Procedure Laterality Date    EPIDURAL STEROID INJECTION N/A 10/8/2024    Procedure: CAUDAL RAFA;  Surgeon: Carlee Koch MD;  Location: [facility]IN [facility];  Service: Pain Management;  Laterality: N/A;  [phone]  2 WK F/U SONA    FUSION, SPINE, CERVICAL, ANTERIOR APPROACH N/A 6/12/2024    Procedure: C5-6,C6-7 ANTERIOR CERVICAL DISCECTOMY AND FUSION;  Surgeon: Umberto Leslie MD;  Location: [facility] OR [address] [place] [address];  Service: Neurosurgery;  Laterality: N/A;    INJECTION, SACROILIAC JOINT Left 11/12/2024    Procedure: INJECTION,SACROILIAC JOINT LEFT;  Surgeon: Carlee Koch MD;  Location: [facility] PAIN [facility]GT;  Service: Pain Management;  Laterality: Left;  [phone]  2 WK F/U MARCELO    INJECTION, SACROILIAC JOINT Left 4/29/2025    Procedure: INJECTION,SACROILIAC JOINT LEFT AND LEFT PIRIFORMIS *[facility] PT*;  Surgeon: Mendel Morgan MD;  Location: [facility] PAIN [facility]GT;  Service: Pain Management;  Laterality: Left;  2 WK F/U MARCELO    SHOULDER SURGERY  4/25/12    rt shoulder     Social History     Socioeconomic History    Marital status:     Number of children: 2   Occupational History     Employer: [employer]   Tobacco Use     Smoking status: Never    Smokeless tobacco: Never   Substance and Sexual Activity    Alcohol use: No    Drug use: Never     Social Drivers of Health     Financial Resource Strain: Low Risk  (8/7/2024)    Overall Financial Resource Strain (CARDIA)     Difficulty of Paying Living Expenses: Not hard at all   Food Insecurity: No Food Insecurity (8/7/2024)    Hunger Vital Sign     Worried About Running Out of Food in the Last Year: Never true     Ran Out of Food in the Last Year: Never true   Transportation Needs: No Transportation Needs (6/13/2024)    TRANSPORTATION NEEDS     Transportation : No   Physical Activity: Sufficiently Active (8/7/2024)    Exercise Vital Sign     Days of Exercise per Week: 4 days     Minutes of Exercise per Session: 40 min   Recent Concern: Physical Activity - Insufficiently Active (6/13/2024)    Exercise Vital Sign     Days of Exercise per Week: 7 days     Minutes of Exercise per Session: 20 min   Stress: No Stress Concern Present (8/7/2024)    [employer] [place] [employer] - Occupational Stress Questionnaire     Feeling of Stress : Not at all   Housing Stability: Unknown (8/7/2024)    Housing Stability Vital Sign     Unable to Pay for Housing in the Last Year: No     Homeless in the Last Year: No     Family History   Problem Relation Name Age of Onset    Cancer Mother          liver    Heart disease Father          cabg    Hyperlipidemia Sister      Hyperlipidemia Sister      Diabetes Daughter      Cirrhosis Neg Hx         Review of patient's allergies indicates:   Allergen Reactions    No known allergies        Current Outpatient Medications   Medication Sig    bempedoic acid-ezetimibe 180-10 mg Tab Take 1 tablet by mouth once daily.    lisinopriL (PRINIVIL,ZESTRIL) 5 MG tablet TAKE 1 TABLET BY MOUTH EVERY DAY    predniSONE (DELTASONE) 20 MG tablet Take 1 tablet (20 mg total) by mouth 2 (two) times daily.    triamcinolone acetonide 0.1% (KENALOG) 0.1 % cream Apply topically 2  (two) times daily.     No current facility-administered medications for this visit.       ROS:  GENERAL: No fever. No chills. No fatigue. Denies weight loss. Denies weight gain.  HEENT: Denies headaches. Denies vision change. Denies eye pain. Denies double vision. Denies ear pain.   CV: Denies chest pain.   PULM: Denies of shortness of breath.  GI: Denies constipation. No diarrhea. No abdominal pain. Denies nausea. Denies vomiting. No blood in stool.  HEME: Denies bleeding problems.  : Denies urgency. No painful urination. No blood in urine.  MS: Denies joint stiffness. Denies joint swelling.  + back pain.  SKIN: Denies rash.   NEURO: Denies seizures. No weakness.  PSYCH:  Denies difficulty sleeping. No anxiety. Denies depression. No suicidal thoughts.       VITALS:   Vitals:    05/19/25 0928   BP: 115/62   Pulse: (!) 51   Temp: 98.6 °F (37 °C)   SpO2: 99%   Weight: 97.2 kg (214 lb 4.6 oz)   PainSc:   3   PainLoc: Back       PHYSICAL EXAM:    GENERAL: Well appearing, in no acute distress, alert and oriented x3.  PSYCH:  Mood and affect appropriate.  SKIN: Skin color, texture, turgor normal, no rashes or lesions.  HEENT:  Normocephalic, atraumatic. Cranial nerves grossly intact.  PULM: No evidence of respiratory difficulty, symmetric chest rise.  GI:  Non-distended  BACK: Normal range of motion with  pain on extension and flexion. No pain to palpation over lumbar spinous process or facet joints bilaterally. SLR in the seated position negative to radicular pain. No pain to palpation to left SI joint. No pain to palpation to left piriformis.   EXTREMITIES: No deformities, edema, or skin discoloration.  No atrophy is noted   NEURO: Sensation is equal and appropriate bilaterally. Bilateral upper and lower extremity strength is normal and symmetric. Bilateral upper and lower extremity coordination and muscle stretch reflexes are physiologic and symmetric. Plantar response are downgoing.   Straight leg raising in the  seated position is Negative Bilaterally to radicular pain.   GAIT: Normal, ambulates with no assistive devices      LABS:      IMAGING:    LUMBAR X-RAY  Results for orders placed during the hospital encounter of 04/09/24    X-Ray Lumbar Complete Including Flex And Ext    Narrative  EXAMINATION:  XR LUMBAR SPINE 5 VIEW WITH FLEX AND EXT    CLINICAL HISTORY:  Scoliosis, unspecified    TECHNIQUE:  Five views of the lumbar spine plus flexion extension views were performed.    COMPARISON:  04/26/2019    FINDINGS:  There is slight lumbar levocurvature.  There is grade 1 anterolisthesis of L5 on S1 accompanied by bilateral L5 pars defects, with anterolisthesis mildly increased.  Mild multilevel degenerative changes are present.  No abnormal motion on flexion or extension is demonstrated.  There is moderate L4-5 and L5-S1 facet arthropathy.    Impression  Grade 1 L5-S1 spondylolisthesis, mildly increased.      Electronically signed by: Brendan Smith MD  Date:    04/09/2024  Time:    15:49     LUMBAR MRI  Results for orders placed during the hospital encounter of 04/24/24    MRI Lumbar Spine Without Contrast    Narrative  EXAMINATION:  MRI LUMBAR SPINE WITHOUT CONTRAST    CLINICAL HISTORY:  Scoliosis; Scoliosis, unspecified    TECHNIQUE:  Multiplanar, multi-sequence MR images were acquired from the thoracolumbar junction to the sacrum without the administration of contrast.    COMPARISON:  MRI cervical and thoracic spine-04/24/2024    FINDINGS:  There is transitional anatomy present at the lumbosacral junction.  The transitional segment will be labeled S1 with a fully formed intervertebral discs noted between S1 and S2.  There is a grade I anterolisthesis of S1 on S2 as a result of bilateral S1 pars defects.  There is a grade I retrolisthesis of L4 on L5 with associated uncovering of the intervertebral disc.  No acute lumbar compression fracture or pathologic marrow replacement process.  There is degenerative disc  desiccation at L2-L3 through S1-S2.    The conus medullaris terminates at L1.  The visualized lower thoracic spinal cord is normal in signal intensity with no evidence of cord edema, myelomalacia, or cord syrinx.  No epidural fluid collections or masses.  The paraspinous musculature is unremarkable.    There are multiple foci of T2 signal hyperintensity within the left kidney in the parapelvic region in the cortex at the upper pole of the left kidney, most likely cysts.    T12-L1: There is a broad central/right paracentral disc protrusion which effaces the anterior CSF sleeve.  There is moderate effacement of the thecal sac.  No neuroforaminal stenosis.    L1-L2: No disc protrusion or extrusion. No central canal stenosis or neuroforaminal stenosis.    L2-L3: There is a broad disc bulge which effaces the anterior CSF sleeve.  No disc protrusion or extrusion. No central canal stenosis or neuroforaminal stenosis.    L3-L4: There is a broad disc bulge which effaces the anterior CSF sleeve and extends into both neural foramina.  There is an annular fissure present within the central portion of the intervertebral disc.  There is, at most, mild right neuroforaminal stenosis.  No left neuroforaminal stenosis.    L4-L5: There is a grade I retrolisthesis of L4 on L5 with associated uncovering of the intervertebral disc.  There is a broad disc bulge which extends into both neural foramina.  There is ligamentum flavum thickening and bilateral hypertrophic facet arthropathy, right greater than left.  There is moderate overall central canal stenosis and moderate bilateral neuroforaminal stenosis.    L5-S1: There is a broad disc bulge which extends into both neural foramina.  There is bilateral hypertrophic degenerative facet arthropathy and ligamentum flavum thickening.  There is severe central canal stenosis, moderate left neuroforaminal stenosis, and moderate-severe right neuroforaminal stenosis.  Please correlate clinically  for symptoms referable to the L5 nerves.    S1-S2: There is a grade I anterolisthesis of S1 on S2 with associated uncovering of the intervertebral disc.  There is a superimposed broad disc bulge which extends into both neural foramina and there is bilateral hypertrophic facet arthropathy and ligamentum flavum thickening.  There is prominent epidural fat deposition observed in the spinal canal at S1-S2.  There is severe effacement of the thecal sac as a result of epidural fat deposition in the aforementioned degenerative changes.  There is severe bilateral neuroforaminal stenosis, left greater than right, which could produce symptoms referable to the S1 nerves.    Impression  1. Transitional anatomy at the lumbosacral junction.  Comparison was made to the cervical and thoracic MRI of today's date to determine correct nomenclature in the lumbar spine.  Please note that this represents differing nomenclature than was described at the time of the patient's MRI performed 05/03/2019.  2. Advanced multilevel degenerative change of the lumbosacral spine resulting in multilevel central canal and neuroforaminal stenosis as detailed above.  3. Grade I anterolisthesis of S1 on S2 and grade I retrolisthesis of L4 on L5  4. Probable left renal cysts  5. Other findings as detailed above.      Electronically signed by: Bryan Ho MD  Date:    04/24/2024  Time:    11:26         ASSESSMENT: 70 y.o. year old male with pain, consistent with:    Encounter Diagnoses   Name Primary?    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 Leslie with 20+ years of low back pain which is worst with prolonged sitting, standing, laying down. He denies walking limitations. Imaging shows bilateral S1 pars defects, multi-level DDD with endplate changes at L4/5 and L5/S1 and mod-severe canal stenosis at L5/S1. Exam shows pain reproduced with facet loading, milgrams. They would like to avoid extensive surgery.       PLAN:  Previous imaging was reviewed and discussed with the patient today.   He is s/p Left SI joint and left piriformis injection with benefit.  Discussed minimally invasive treatment options.   Continue HEP  Continue OTC for pain control.   RTC 1-2 months for follow-up, can be virtual.   Consider repeat Caudal RAFA.   Consider Intracept at L3 and L4    The above plan and management options were discussed at length with patient. Patient is in agreement with the above and verbalized understanding.    Marilyn Carrasco NP  05/19/2025

## 2025-05-31 DIAGNOSIS — I10 ESSENTIAL HYPERTENSION: ICD-10-CM

## 2025-05-31 RX ORDER — LISINOPRIL 5 MG/1
5 TABLET ORAL
Qty: 90 TABLET | Refills: 1 | Status: SHIPPED | OUTPATIENT
Start: 2025-05-31

## 2025-05-31 NOTE — TELEPHONE ENCOUNTER
No care due was identified.  [facility] Embedded Care Due Messages. Reference number: [id number].   5/31/2025 12:24:12 AM CDT

## 2025-05-31 NOTE — TELEPHONE ENCOUNTER
Refill Decision Note   Leonardo Pisano  is requesting a refill authorization.  Brief Assessment and Rationale for Refill:        Medication Therapy Plan:            Comments:     Note composed:2:58 PM 05/31/2025

## 2025-06-02 ENCOUNTER — LAB VISIT (OUTPATIENT)
Dept: LAB | Facility: HOSPITAL | Age: 70
End: 2025-06-02
Attending: FAMILY MEDICINE
Payer: MEDICARE

## 2025-06-02 DIAGNOSIS — R73.09 ELEVATED GLUCOSE: ICD-10-CM

## 2025-06-02 DIAGNOSIS — Z12.5 SCREENING FOR PROSTATE CANCER: ICD-10-CM

## 2025-06-02 DIAGNOSIS — E78.2 MIXED HYPERLIPIDEMIA: ICD-10-CM

## 2025-06-02 LAB
ABSOLUTE EOSINOPHIL (OHS): 0.17 K/UL
ABSOLUTE MONOCYTE (OHS): 0.41 K/UL (ref 0.3–1)
ABSOLUTE NEUTROPHIL COUNT (OHS): 2.59 K/UL (ref 1.8–7.7)
ALBUMIN SERPL BCP-MCNC: 4 G/DL (ref 3.5–5.2)
ALP SERPL-CCNC: 81 UNIT/L (ref 40–150)
ALT SERPL W/O P-5'-P-CCNC: 29 UNIT/L (ref 10–44)
ANION GAP (OHS): 6 MMOL/L (ref 8–16)
AST SERPL-CCNC: 24 UNIT/L (ref 11–45)
BASOPHILS # BLD AUTO: 0.02 K/UL
BASOPHILS NFR BLD AUTO: 0.4 %
BILIRUB SERPL-MCNC: 0.7 MG/DL (ref 0.1–1)
BUN SERPL-MCNC: 21 MG/DL (ref 8–23)
CALCIUM SERPL-MCNC: 9.7 MG/DL (ref 8.7–10.5)
CHLORIDE SERPL-SCNC: 107 MMOL/L (ref 95–110)
CHOLEST SERPL-MCNC: 222 MG/DL (ref 120–199)
CHOLEST/HDLC SERPL: 2.8 {RATIO} (ref 2–5)
CO2 SERPL-SCNC: 26 MMOL/L (ref 23–29)
CREAT SERPL-MCNC: 0.9 MG/DL (ref 0.5–1.4)
ERYTHROCYTE [DISTWIDTH] IN BLOOD BY AUTOMATED COUNT: 12.4 % (ref 11.5–14.5)
GFR SERPLBLD CREATININE-BSD FMLA CKD-EPI: >60 ML/MIN/1.73/M2
GLUCOSE SERPL-MCNC: 106 MG/DL (ref 70–110)
HCT VFR BLD AUTO: 41.8 % (ref 40–54)
HDLC SERPL-MCNC: 79 MG/DL (ref 40–75)
HDLC SERPL: 35.6 % (ref 20–50)
HGB BLD-MCNC: 14.3 GM/DL (ref 14–18)
IMM GRANULOCYTES # BLD AUTO: 0.01 K/UL (ref 0–0.04)
IMM GRANULOCYTES NFR BLD AUTO: 0.2 % (ref 0–0.5)
LDLC SERPL CALC-MCNC: 124 MG/DL (ref 63–159)
LYMPHOCYTES # BLD AUTO: 1.35 K/UL (ref 1–4.8)
MCH RBC QN AUTO: 31.7 PG (ref 27–31)
MCHC RBC AUTO-ENTMCNC: 34.2 G/DL (ref 32–36)
MCV RBC AUTO: 93 FL (ref 82–98)
NONHDLC SERPL-MCNC: 143 MG/DL
NUCLEATED RBC (/100WBC) (OHS): 0 /100 WBC
PLATELET # BLD AUTO: 205 K/UL (ref 150–450)
PMV BLD AUTO: 9 FL (ref 9.2–12.9)
POTASSIUM SERPL-SCNC: 4.5 MMOL/L (ref 3.5–5.1)
PROT SERPL-MCNC: 7.1 GM/DL (ref 6–8.4)
PSA SERPL-MCNC: 0.57 NG/ML
RBC # BLD AUTO: 4.51 M/UL (ref 4.6–6.2)
RELATIVE EOSINOPHIL (OHS): 3.7 %
RELATIVE LYMPHOCYTE (OHS): 29.7 % (ref 18–48)
RELATIVE MONOCYTE (OHS): 9 % (ref 4–15)
RELATIVE NEUTROPHIL (OHS): 57 % (ref 38–73)
SODIUM SERPL-SCNC: 139 MMOL/L (ref 136–145)
TRIGL SERPL-MCNC: 95 MG/DL (ref 30–150)
WBC # BLD AUTO: 4.55 K/UL (ref 3.9–12.7)

## 2025-06-02 PROCEDURE — 82947 ASSAY GLUCOSE BLOOD QUANT: CPT

## 2025-06-02 PROCEDURE — 84478 ASSAY OF TRIGLYCERIDES: CPT

## 2025-06-02 PROCEDURE — 84153 ASSAY OF PSA TOTAL: CPT

## 2025-06-02 PROCEDURE — 36415 COLL VENOUS BLD VENIPUNCTURE: CPT

## 2025-06-02 PROCEDURE — 85025 COMPLETE CBC W/AUTO DIFF WBC: CPT

## 2025-06-04 ENCOUNTER — OFFICE VISIT (OUTPATIENT)
Dept: INTERNAL MEDICINE | Facility: CLINIC | Age: 70
End: 2025-06-04
Payer: MEDICARE

## 2025-06-04 VITALS
OXYGEN SATURATION: 99 % | DIASTOLIC BLOOD PRESSURE: 64 MMHG | HEIGHT: 74 IN | SYSTOLIC BLOOD PRESSURE: 120 MMHG | WEIGHT: 211 LBS | HEART RATE: 63 BPM | RESPIRATION RATE: 18 BRPM | BODY MASS INDEX: 27.08 KG/M2

## 2025-06-04 DIAGNOSIS — I10 ESSENTIAL HYPERTENSION: Primary | ICD-10-CM

## 2025-06-04 DIAGNOSIS — T46.6X5A STATIN MYOPATHY: ICD-10-CM

## 2025-06-04 DIAGNOSIS — G95.9 CERVICAL MYELOPATHY: ICD-10-CM

## 2025-06-04 DIAGNOSIS — Z12.11 COLON CANCER SCREENING: ICD-10-CM

## 2025-06-04 DIAGNOSIS — G72.0 STATIN MYOPATHY: ICD-10-CM

## 2025-06-04 DIAGNOSIS — E78.2 MIXED HYPERLIPIDEMIA: ICD-10-CM

## 2025-06-04 PROBLEM — E66.9 OBESITY (BMI 30-39.9): Status: RESOLVED | Noted: 2023-12-14 | Resolved: 2025-06-04

## 2025-06-04 PROCEDURE — 99999 PR PBB SHADOW E&M-EST. PATIENT-LVL III: CPT | Mod: PBBFAC,,, | Performed by: FAMILY MEDICINE

## 2025-06-04 PROCEDURE — 1101F PT FALLS ASSESS-DOCD LE1/YR: CPT | Mod: CPTII,S$GLB,, | Performed by: FAMILY MEDICINE

## 2025-06-04 PROCEDURE — 1159F MED LIST DOCD IN RCRD: CPT | Mod: CPTII,S$GLB,, | Performed by: FAMILY MEDICINE

## 2025-06-04 PROCEDURE — 4010F ACE/ARB THERAPY RXD/TAKEN: CPT | Mod: CPTII,S$GLB,, | Performed by: FAMILY MEDICINE

## 2025-06-04 PROCEDURE — 99214 OFFICE O/P EST MOD 30 MIN: CPT | Mod: S$GLB,,, | Performed by: FAMILY MEDICINE

## 2025-06-04 PROCEDURE — 1125F AMNT PAIN NOTED PAIN PRSNT: CPT | Mod: CPTII,S$GLB,, | Performed by: FAMILY MEDICINE

## 2025-06-04 PROCEDURE — 3008F BODY MASS INDEX DOCD: CPT | Mod: CPTII,S$GLB,, | Performed by: FAMILY MEDICINE

## 2025-06-04 PROCEDURE — 3078F DIAST BP <80 MM HG: CPT | Mod: CPTII,S$GLB,, | Performed by: FAMILY MEDICINE

## 2025-06-04 PROCEDURE — 3074F SYST BP LT 130 MM HG: CPT | Mod: CPTII,S$GLB,, | Performed by: FAMILY MEDICINE

## 2025-06-04 PROCEDURE — 1160F RVW MEDS BY RX/DR IN RCRD: CPT | Mod: CPTII,S$GLB,, | Performed by: FAMILY MEDICINE

## 2025-06-04 PROCEDURE — 3288F FALL RISK ASSESSMENT DOCD: CPT | Mod: CPTII,S$GLB,, | Performed by: FAMILY MEDICINE

## 2025-06-09 ENCOUNTER — PATIENT MESSAGE (OUTPATIENT)
Dept: INTERNAL MEDICINE | Facility: CLINIC | Age: 70
End: 2025-06-09
Payer: MEDICARE

## 2025-06-17 ENCOUNTER — RESULTS FOLLOW-UP (OUTPATIENT)
Dept: INTERNAL MEDICINE | Facility: CLINIC | Age: 70
End: 2025-06-17

## 2025-07-09 ENCOUNTER — OFFICE VISIT (OUTPATIENT)
Dept: PAIN MEDICINE | Facility: CLINIC | Age: 70
End: 2025-07-09
Payer: MEDICARE

## 2025-07-09 DIAGNOSIS — G89.29 OTHER CHRONIC PAIN: ICD-10-CM

## 2025-07-09 DIAGNOSIS — M53.3 SACROILIAC JOINT DYSFUNCTION: Primary | ICD-10-CM

## 2025-07-09 DIAGNOSIS — M47.816 LUMBAR SPONDYLOSIS: ICD-10-CM

## 2025-07-09 DIAGNOSIS — M48.061 SPINAL STENOSIS OF LUMBAR REGION WITHOUT NEUROGENIC CLAUDICATION: ICD-10-CM

## 2025-07-09 DIAGNOSIS — M51.362 DEGENERATION OF INTERVERTEBRAL DISC OF LUMBAR REGION WITH DISCOGENIC BACK PAIN AND LOWER EXTREMITY PAIN: ICD-10-CM

## 2025-07-09 DIAGNOSIS — G57.00 PIRIFORMIS SYNDROME, UNSPECIFIED LATERALITY: ICD-10-CM

## 2025-07-09 PROCEDURE — 1160F RVW MEDS BY RX/DR IN RCRD: CPT | Mod: CPTII,95,,

## 2025-07-09 PROCEDURE — 98004 SYNCH AUDIO-VIDEO EST SF 10: CPT | Mod: 95,,,

## 2025-07-09 PROCEDURE — 4010F ACE/ARB THERAPY RXD/TAKEN: CPT | Mod: CPTII,95,,

## 2025-07-09 PROCEDURE — 1159F MED LIST DOCD IN RCRD: CPT | Mod: CPTII,95,,

## 2025-07-09 NOTE — PROGRESS NOTES
Interventional Pain Management - Established Visit  Virtual     The patient location is: [place]  The chief complaint leading to consultation is: lower back pain     Visit type: audiovisual     Face to Face time with patient: 7  10 minutes of total time spent on the encounter, which includes face to face time and non-face to face time preparing to see the patient (eg, review of tests), Obtaining and/or reviewing separately obtained history, Documenting clinical information in the electronic or other health record, Independently interpreting results (not separately reported) and communicating results to the patient/family/caregiver, or Care coordination (not separately reported).            Each patient to whom he or she provides medical services by telemedicine is:  (1) informed of the relationship between the physician and patient and the respective role of any other health care provider with respect to management of the patient; and (2) notified that he or she may decline to receive medical services by telemedicine and may withdraw from such care at any time.     Notes:       PCP: Saud Coles MD    REFERRING PHYSICIAN: No ref. provider found (Neurosurgery)    CHIEF COMPLAINT: low back pain    Original HISTORY OF PRESENT ILLNESS: Leonardo Pisano presents to the clinic for the evaluation of the above pain. The pain started 20+ years ago    Original Pain Description:  The pain is located in the low back and is axial in nature.  He does endorsing radiating pain to bilateral posterior thigh and stops at the knee  The pain is described as sharp and tight band.   Exacerbating factors: Sitting, Standing, Laying, Morning, and Getting out of bed/chair.   Mitigating factors physical therapy and rest.   Symptoms interfere with daily activity and sleeping.   The patient feels like symptoms have been worsening.  Patient reports no distance restriction with walking  Patient denies night fever/night sweats, urinary  incontinence, and bowel incontinence.  Occupation: retired        Original PAIN SCORES:  Best: Pain is 1  Worst: Pain is 10  Current: Pain is 2        5/19/2025     9:28 AM   Last 3 PDI Scores   Pain Disability Index (PDI) 0       INTERVAL HISTORY: (Newest visit at the bottom)   Interval History (10/22/2024):   Leonardo Pisano returns to clinic for follow-up after Caudal RAFA on 10/8/2024. He reports limited pain relief. He continues with left lower back/buttock pain. The pain is worse with walking and getting out of a chair. He continues Tylenol with some relief. He denies any perceived side effects. He denies recent health changes. He denies recent falls or trauma. He denies new onset fever/night sweats, urinary incontinence, bowel incontinence, significant weight changes, significant motor weakness or changes, or loss of sensations. His pain today is 3/10.      Interval History (12/4/2024):  Leonardo Pisano returns to clinic for follow-up after left SI joint injection on 11/12/2024. He reports 90% relief and improved quality of life. He has noticed improved sleep. He continues extra strength tylenol with some relief. He denies any perceived side effects. He continues exercise and HEP daily. He denies recent health changes. He denies recent falls or trauma. He denies new onset fever/night sweats, urinary incontinence, bowel incontinence, significant weight changes, significant motor weakness or changes, or loss of sensations. His pain today is 2/10.     Interval History 3/31/2025:  Leonardo Pisano returns to clinic for follow-up of left lower back/buttock pain. He has had left SI joint injections which provided 90% relief for 4.5 months. He would like to repeat this injection at this time. He continues exercise and HEP daily. He notices good relief with daily exercise. His pain has been worse at the end of the day. He takes Advil with good benefit. He denies recent health changes. He denies recent falls or trauma.  He denies new onset fever/night sweats, urinary incontinence, bowel incontinence, significant weight changes, significant motor weakness or changes, or loss of sensations. His pain today is 6/10.     Interval History 5/19/2025:  Leonardo Pisano returns for follow-up after left SI joint and left piriformis injection on 4/29/2025. He states he has noticed increased lower back pain with radiation into the left leg in an L4/L5 distribution. He had 80% benefit from prior Caudal RAFA for 5-6 months. He continues advil without any perceived side effects. He denies recent health changes. He denies recent falls or trauma. He denies new onset fever/night sweats, urinary incontinence, bowel incontinence, significant weight changes, significant motor weakness or changes, or loss of sensations. His pain today is 3/10.  At worst, the back pain is 6/10.     Interval History 7/9/2025 (VIRTUAL):  Leonardo Pisano returns for follow-up of chronic lower back and left leg pain. He states with his last office visit the leg pain has resolved. She continues with mild SI joint pain. He states the pain is tolerable. He continues with benefit from his last SI joint injection at 80% relief. He continues with daily exercise and HEP with benefit. He continues advil as needed without any perceived side effects. He denies recent health changes. He denies recent falls or trauma. He denies new onset fever/night sweats, urinary incontinence, bowel incontinence, significant weight changes, significant motor weakness or changes, or loss of sensations. His pain today is 3/10.        6 weeks of Conservative therapy:  PT: completed PT (9/2024)  Chiro: none  HEP: daily exercise including PT exercises. (>6 weeks within the last 6 month)    Relevant Surgical Hx  6/12/2024 - ACD C5-6 and C6-7 (Anuj)      Treatments / Medications: (Ice/Heat/NSAIDS/APAP/etc):  Tylenol      Blood thinners: none      Interventional Pain Procedures: (Previous injections)  Pain  intervention 20 years ago that was effective for 1 year  10/8/2024 - Caudal RAFA - 80% relief of lower back pain x 5-6 months  11/12/2024 - Left SI joint injection - 90% relief x 4.5 months relief  4/29/2025 - Left SI joint and Left piriformis        Past Medical History:   Diagnosis Date    Back pain     Degenerative disc disease     Disorder of kidney and ureter     stone    Essential hypertension 04/03/2018    Hepatitis     Hyperlipidemia     Hypoxia 06/02/2021    Obesity     Pneumonia     Pneumonia due to COVID-19 virus 06/03/2021    Trouble in sleeping     due to pain     Past Surgical History:   Procedure Laterality Date    EPIDURAL STEROID INJECTION N/A 10/8/2024    Procedure: CAUDAL RAFA;  Surgeon: Carlee Koch MD;  Location: [facility];  Service: Pain Management;  Laterality: N/A;  [phone]  2 WK F/U SONA    FUSION, SPINE, CERVICAL, ANTERIOR APPROACH N/A 6/12/2024    Procedure: C5-6,C6-7 ANTERIOR CERVICAL DISCECTOMY AND FUSION;  Surgeon: Umberto Leslie MD;  Location: [facility] OR [address] [place] [address];  Service: Neurosurgery;  Laterality: N/A;    INJECTION, SACROILIAC JOINT Left 11/12/2024    Procedure: INJECTION,SACROILIAC JOINT LEFT;  Surgeon: Carlee Koch MD;  Location: [facility];  Service: Pain Management;  Laterality: Left;  [phone]  2 WK F/U MARCELO    INJECTION, SACROILIAC JOINT Left 4/29/2025    Procedure: INJECTION,SACROILIAC JOINT LEFT AND LEFT PIRIFORMIS *RASHAWN PT*;  Surgeon: Mendel Morgan MD;  Location: [facility];  Service: Pain Management;  Laterality: Left;  2 WK F/U MARCELO    SHOULDER SURGERY  4/25/12    rt shoulder     Social History     Socioeconomic History    Marital status:     Number of children: 2   Occupational History     Employer: [employer]   Tobacco Use    Smoking status: Never    Smokeless tobacco: Never   Substance and Sexual Activity    Alcohol use: No    Drug use: Never     Social Drivers of Health     Financial Resource Strain: Low Risk  (5/26/2025)     Overall Financial Resource Strain (CARDIA)     Difficulty of Paying Living Expenses: Not hard at all   Food Insecurity: No Food Insecurity (5/26/2025)    Hunger Vital Sign     Worried About Running Out of Food in the Last Year: Never true     Ran Out of Food in the Last Year: Never true   Transportation Needs: No Transportation Needs (5/26/2025)    PRAPARE - Transportation     Lack of Transportation (Medical): No     Lack of Transportation (Non-Medical): No   Physical Activity: Sufficiently Active (5/26/2025)    Exercise Vital Sign     Days of Exercise per Week: 7 days     Minutes of Exercise per Session: 50 min   Stress: No Stress Concern Present (5/26/2025)    [employer] - Occupational Stress Questionnaire     Feeling of Stress : Not at all   Housing Stability: Low Risk  (5/26/2025)    Housing Stability Vital Sign     Unable to Pay for Housing in the Last Year: No     Number of Times Moved in the Last Year: 0     Homeless in the Last Year: No     Family History   Problem Relation Name Age of Onset    Cancer Mother          liver    Heart disease Father          cabg    Hyperlipidemia Sister      Hyperlipidemia Sister      Diabetes Daughter      Cirrhosis Neg Hx         Review of patient's allergies indicates:   Allergen Reactions    No known allergies        Current Outpatient Medications   Medication Sig    bempedoic acid-ezetimibe 180-10 mg Tab Take 1 tablet by mouth once daily.    lisinopriL (PRINIVIL,ZESTRIL) 5 MG tablet TAKE 1 TABLET BY MOUTH EVERY DAY     No current facility-administered medications for this visit.       ROS:  GENERAL: No fever. No chills. No fatigue. Denies weight loss. Denies weight gain.  HEENT: Denies headaches. Denies vision change. Denies eye pain. Denies double vision. Denies ear pain.   CV: Denies chest pain.   PULM: Denies of shortness of breath.  GI: Denies constipation. No diarrhea. No abdominal pain. Denies nausea. Denies vomiting. No blood in  stool.  HEME: Denies bleeding problems.  : Denies urgency. No painful urination. No blood in urine.  MS: Denies joint stiffness. Denies joint swelling.  + back pain.  SKIN: Denies rash.   NEURO: Denies seizures. No weakness.  PSYCH:  Denies difficulty sleeping. No anxiety. Denies depression. No suicidal thoughts.       VITALS:   There were no vitals filed for this visit.    VIRTUAL PHYSICAL EXAMINATION:    GENERAL APPEARANCE: Well appearing, in no acute distress.  PSYCH:  Mood and affect appropriate.  SKIN: Skin color, texture, turgor normal, no rashes or lesions to visible areas.  HEAD/FACE:  Normocephalic, atraumatic.  PULM: Bilateral chest rise. No apparent respiratory distress.      Prior PHYSICAL EXAM:    GENERAL: Well appearing, in no acute distress, alert and oriented x3.  PSYCH:  Mood and affect appropriate.  SKIN: Skin color, texture, turgor normal, no rashes or lesions.  HEENT:  Normocephalic, atraumatic. Cranial nerves grossly intact.  PULM: No evidence of respiratory difficulty, symmetric chest rise.  GI:  Non-distended  BACK: Normal range of motion with  pain on extension and flexion. No pain to palpation over lumbar spinous process or facet joints bilaterally. SLR in the seated position negative to radicular pain. No pain to palpation to left SI joint. No pain to palpation to left piriformis.   EXTREMITIES: No deformities, edema, or skin discoloration.  No atrophy is noted   NEURO: Sensation is equal and appropriate bilaterally. Bilateral upper and lower extremity strength is normal and symmetric. Bilateral upper and lower extremity coordination and muscle stretch reflexes are physiologic and symmetric. Plantar response are downgoing.   Straight leg raising in the seated position is Negative Bilaterally to radicular pain.   GAIT: Normal, ambulates with no assistive devices      LABS:      IMAGING:    LUMBAR X-RAY  Results for orders placed during the hospital encounter of 04/09/24    X-Ray Lumbar  Complete Including Flex And Ext    Narrative  EXAMINATION:  XR LUMBAR SPINE 5 VIEW WITH FLEX AND EXT    CLINICAL HISTORY:  Scoliosis, unspecified    TECHNIQUE:  Five views of the lumbar spine plus flexion extension views were performed.    COMPARISON:  04/26/2019    FINDINGS:  There is slight lumbar levocurvature.  There is grade 1 anterolisthesis of L5 on S1 accompanied by bilateral L5 pars defects, with anterolisthesis mildly increased.  Mild multilevel degenerative changes are present.  No abnormal motion on flexion or extension is demonstrated.  There is moderate L4-5 and L5-S1 facet arthropathy.    Impression  Grade 1 L5-S1 spondylolisthesis, mildly increased.      Electronically signed by: Brendan Smith MD  Date:    04/09/2024  Time:    15:49     LUMBAR MRI  Results for orders placed during the hospital encounter of 04/24/24    MRI Lumbar Spine Without Contrast    Narrative  EXAMINATION:  MRI LUMBAR SPINE WITHOUT CONTRAST    CLINICAL HISTORY:  Scoliosis; Scoliosis, unspecified    TECHNIQUE:  Multiplanar, multi-sequence MR images were acquired from the thoracolumbar junction to the sacrum without the administration of contrast.    COMPARISON:  MRI cervical and thoracic spine-04/24/2024    FINDINGS:  There is transitional anatomy present at the lumbosacral junction.  The transitional segment will be labeled S1 with a fully formed intervertebral discs noted between S1 and S2.  There is a grade I anterolisthesis of S1 on S2 as a result of bilateral S1 pars defects.  There is a grade I retrolisthesis of L4 on L5 with associated uncovering of the intervertebral disc.  No acute lumbar compression fracture or pathologic marrow replacement process.  There is degenerative disc desiccation at L2-L3 through S1-S2.    The conus medullaris terminates at L1.  The visualized lower thoracic spinal cord is normal in signal intensity with no evidence of cord edema, myelomalacia, or cord syrinx.  No epidural fluid collections  or masses.  The paraspinous musculature is unremarkable.    There are multiple foci of T2 signal hyperintensity within the left kidney in the parapelvic region in the cortex at the upper pole of the left kidney, most likely cysts.    T12-L1: There is a broad central/right paracentral disc protrusion which effaces the anterior CSF sleeve.  There is moderate effacement of the thecal sac.  No neuroforaminal stenosis.    L1-L2: No disc protrusion or extrusion. No central canal stenosis or neuroforaminal stenosis.    L2-L3: There is a broad disc bulge which effaces the anterior CSF sleeve.  No disc protrusion or extrusion. No central canal stenosis or neuroforaminal stenosis.    L3-L4: There is a broad disc bulge which effaces the anterior CSF sleeve and extends into both neural foramina.  There is an annular fissure present within the central portion of the intervertebral disc.  There is, at most, mild right neuroforaminal stenosis.  No left neuroforaminal stenosis.    L4-L5: There is a grade I retrolisthesis of L4 on L5 with associated uncovering of the intervertebral disc.  There is a broad disc bulge which extends into both neural foramina.  There is ligamentum flavum thickening and bilateral hypertrophic facet arthropathy, right greater than left.  There is moderate overall central canal stenosis and moderate bilateral neuroforaminal stenosis.    L5-S1: There is a broad disc bulge which extends into both neural foramina.  There is bilateral hypertrophic degenerative facet arthropathy and ligamentum flavum thickening.  There is severe central canal stenosis, moderate left neuroforaminal stenosis, and moderate-severe right neuroforaminal stenosis.  Please correlate clinically for symptoms referable to the L5 nerves.    S1-S2: There is a grade I anterolisthesis of S1 on S2 with associated uncovering of the intervertebral disc.  There is a superimposed broad disc bulge which extends into both neural foramina and there  is bilateral hypertrophic facet arthropathy and ligamentum flavum thickening.  There is prominent epidural fat deposition observed in the spinal canal at S1-S2.  There is severe effacement of the thecal sac as a result of epidural fat deposition in the aforementioned degenerative changes.  There is severe bilateral neuroforaminal stenosis, left greater than right, which could produce symptoms referable to the S1 nerves.    Impression  1. Transitional anatomy at the lumbosacral junction.  Comparison was made to the cervical and thoracic MRI of today's date to determine correct nomenclature in the lumbar spine.  Please note that this represents differing nomenclature than was described at the time of the patient's MRI performed 05/03/2019.  2. Advanced multilevel degenerative change of the lumbosacral spine resulting in multilevel central canal and neuroforaminal stenosis as detailed above.  3. Grade I anterolisthesis of S1 on S2 and grade I retrolisthesis of L4 on L5  4. Probable left renal cysts  5. Other findings as detailed above.      Electronically signed by: Bryan Ho MD  Date:    04/24/2024  Time:    11:26         ASSESSMENT: 70 y.o. year old male with pain, consistent with:    Encounter Diagnoses   Name Primary?    Sacroiliac joint dysfunction Yes    Piriformis syndrome, unspecified laterality     Other chronic pain     Lumbar spondylosis     Spinal stenosis of lumbar region without neurogenic claudication     Degeneration of intervertebral disc of lumbar region with discogenic back pain and lower extremity pain          DISCUSSION: Leonardo Pisano is a retired gentleman who comes to us from Dr. Leslie with 20+ years of low back pain which is worst with prolonged sitting, standing, laying down. He denies walking limitations. Imaging shows bilateral S1 pars defects, multi-level DDD with endplate changes at L4/5 and L5/S1 and mod-severe canal stenosis at L5/S1. Exam shows pain reproduced with facet loading,  milgrams, and SI joint provacative maneuvers. They would like to avoid extensive surgery.       PLAN:  Previous imaging was reviewed and discussed with the patient today.   He is s/p Left SI joint and left piriformis injection with 80% continued relief.  Discussed minimally invasive treatment options.   Continue HEP  Continue OTC for pain control.   RTC 2 months or sooner if needed.   Consider repeat Caudal RAFA.   Consider Intracept at L3 and L4    The above plan and management options were discussed at length with patient. Patient is in agreement with the above and verbalized understanding.    Marilyn Carrasco NP  07/09/2025

## 2025-07-14 ENCOUNTER — OFFICE VISIT (OUTPATIENT)
Dept: INTERNAL MEDICINE | Facility: CLINIC | Age: 70
End: 2025-07-14
Payer: MEDICARE

## 2025-07-14 VITALS
HEART RATE: 68 BPM | OXYGEN SATURATION: 98 % | RESPIRATION RATE: 18 BRPM | HEIGHT: 74 IN | DIASTOLIC BLOOD PRESSURE: 60 MMHG | WEIGHT: 217.81 LBS | BODY MASS INDEX: 27.95 KG/M2 | SYSTOLIC BLOOD PRESSURE: 122 MMHG

## 2025-07-14 DIAGNOSIS — G95.9 CERVICAL MYELOPATHY: ICD-10-CM

## 2025-07-14 DIAGNOSIS — M48.061 SPINAL STENOSIS OF LUMBAR REGION WITHOUT NEUROGENIC CLAUDICATION: ICD-10-CM

## 2025-07-14 DIAGNOSIS — R73.03 PRE-DIABETES: Primary | ICD-10-CM

## 2025-07-14 DIAGNOSIS — D69.2 SENILE PURPURA: ICD-10-CM

## 2025-07-14 DIAGNOSIS — K76.0 FATTY LIVER: ICD-10-CM

## 2025-07-14 DIAGNOSIS — G72.0 STATIN MYOPATHY: ICD-10-CM

## 2025-07-14 DIAGNOSIS — E78.2 MIXED HYPERLIPIDEMIA: ICD-10-CM

## 2025-07-14 DIAGNOSIS — Z00.00 ENCOUNTER FOR MEDICARE ANNUAL WELLNESS EXAM: ICD-10-CM

## 2025-07-14 DIAGNOSIS — T46.6X5A STATIN MYOPATHY: ICD-10-CM

## 2025-07-14 DIAGNOSIS — I10 ESSENTIAL HYPERTENSION: ICD-10-CM

## 2025-07-14 PROCEDURE — 99999 PR PBB SHADOW E&M-EST. PATIENT-LVL V: CPT | Mod: PBBFAC,,, | Performed by: NURSE PRACTITIONER

## 2025-07-14 PROCEDURE — 3288F FALL RISK ASSESSMENT DOCD: CPT | Mod: CPTII,S$GLB,, | Performed by: NURSE PRACTITIONER

## 2025-07-14 PROCEDURE — 3078F DIAST BP <80 MM HG: CPT | Mod: CPTII,S$GLB,, | Performed by: NURSE PRACTITIONER

## 2025-07-14 PROCEDURE — 1159F MED LIST DOCD IN RCRD: CPT | Mod: CPTII,S$GLB,, | Performed by: NURSE PRACTITIONER

## 2025-07-14 PROCEDURE — 3074F SYST BP LT 130 MM HG: CPT | Mod: CPTII,S$GLB,, | Performed by: NURSE PRACTITIONER

## 2025-07-14 PROCEDURE — 1101F PT FALLS ASSESS-DOCD LE1/YR: CPT | Mod: CPTII,S$GLB,, | Performed by: NURSE PRACTITIONER

## 2025-07-14 PROCEDURE — G0439 PPPS, SUBSEQ VISIT: HCPCS | Mod: S$GLB,,, | Performed by: NURSE PRACTITIONER

## 2025-07-14 PROCEDURE — 1170F FXNL STATUS ASSESSED: CPT | Mod: CPTII,S$GLB,, | Performed by: NURSE PRACTITIONER

## 2025-07-14 PROCEDURE — 1160F RVW MEDS BY RX/DR IN RCRD: CPT | Mod: CPTII,S$GLB,, | Performed by: NURSE PRACTITIONER

## 2025-07-14 PROCEDURE — 1126F AMNT PAIN NOTED NONE PRSNT: CPT | Mod: CPTII,S$GLB,, | Performed by: NURSE PRACTITIONER

## 2025-07-14 PROCEDURE — 4010F ACE/ARB THERAPY RXD/TAKEN: CPT | Mod: CPTII,S$GLB,, | Performed by: NURSE PRACTITIONER

## 2025-07-14 PROCEDURE — 1158F ADVNC CARE PLAN TLK DOCD: CPT | Mod: CPTII,S$GLB,, | Performed by: NURSE PRACTITIONER

## 2025-07-14 RX ORDER — MULTIVITAMIN WITH IRON
TABLET ORAL DAILY
COMMUNITY

## 2025-07-14 NOTE — PROGRESS NOTES
"      Leonardo Pisano presented for a  Medicare AWV and comprehensive Health Risk Assessment today. The following components were reviewed and updated:    Medical history  Family History  Social history  Allergies and Current Medications  Health Risk Assessment  Health Maintenance  Care Team         ** See Completed Assessments for Annual Wellness Visit within the encounter summary.**         The following assessments were completed:  Living Situation  CAGE  Depression Screening  Timed Get Up and Go  Whisper Test  Cognitive Function Screening  Nutrition Screening  ADL Screening  PAQ Screening      Opioid documentation:      Patient does not have a current opioid prescription.       reviewed and he has a short course of oxycodone 42 tabkets- 7 day supply 6/2024-nothing since then   Vitals:    07/14/25 0831   BP: 122/60   Pulse: 68   Resp: 18   SpO2: 98%   Weight: 98.8 kg (217 lb 13 oz)   Height: 6' 2" (1.88 m)     Body mass index is 27.97 kg/m².  Physical Exam  Vitals and nursing note reviewed.   Constitutional:       Appearance: Normal appearance.   HENT:      Head: Normocephalic and atraumatic.   Cardiovascular:      Rate and Rhythm: Normal rate and regular rhythm.   Pulmonary:      Effort: Pulmonary effort is normal.      Breath sounds: Normal breath sounds.   Musculoskeletal:         General: No swelling. Normal range of motion.   Skin:     General: Skin is warm and dry.      Capillary Refill: Capillary refill takes less than 2 seconds.      Findings: Bruising present.   Neurological:      General: No focal deficit present.      Mental Status: He is alert and oriented to person, place, and time.   Psychiatric:         Mood and Affect: Mood normal.         Behavior: Behavior normal.         Thought Content: Thought content normal.         Judgment: Judgment normal.               Diagnoses and health risks identified today and associated recommendations/orders:    Problem List Items Addressed This Visit  "   Encounter for Medicare annual wellness exam      He follows with Dr Villavicencio for PCP and had cbc/cmp./lipid and psa 6/2025   Cologuard 2025 negative   Discussed shingrix and RSv vaccines- he declines      Essential hypertension    Overview   Two gram sodium diet.    Weight loss discussed which he has been intentional with BMI down to 27  Goes to gym 5-6 days a week   Avoid smoking.    Current medications will be:  Lisinopril 5 mg daily  Bp well controlled 122/60         Mixed hyperlipidemia    Overview   Low fat diet.  Avoid sweets.  Weight loss discussed which he has been intentional with BMI down to 27  Goes to gym 5-6 days a week    Increase consumption of fruits and vegetables, fish and chicken.  Crestor caused elevated LFTs.  On  Nexlizet.     (was 175 6 months ago)         Spinal stenosis of lumbar region without neurogenic claudication    Overview   Patient seeing pain specialist- has SI joint inj every 4 months   Has f/u EZIO Carrasco 8/21 6 weeks f/u from last inj            Senile purpura    Overview   Avoid bumping into objects which would cause bruising.  See photo in exam-   Lab Results   Component Value Date    WBC 4.55 06/02/2025    HGB 14.3 06/02/2025    HCT 41.8 06/02/2025    MCV 93 06/02/2025     06/02/2025                Statin myopathy  Low fat diet.  Avoid sweets.  Weight loss discussed which he has been intentional with BMI down to 27  Goes to gym 5-6 days a week    Increase consumption of fruits and vegetables, fish and chicken.  Crestor caused elevated LFTs.  On  Nexlizet.     (was 175 6 months ago)      Cervical myelopathy  Follows with pain management   Reports he has cadaver bones in cervical spine       Fatty liver    Overview   Stopped statin and the numbers have come down. Discussed diet and exercise to control cholesterol as well as help with fatty liver   Fatty liver identified on US abd 5/2024   Has fibroscan scheduled 9/2025 with hepatology f/u  Lab Results   Component  Value Date    ALT 29 06/02/2025    AST 24 06/02/2025    ALKPHOS 81 06/02/2025    BILITOT 0.7 06/02/2025               Other Visit Diagnoses         Pre-diabetes    -  Primary  He has lost weight with diet and exercise 241>> 217 over the last year   Glucose 106 last fasting eval 6/2025                      Provided Leonardo with a 5-10 year written screening schedule and personal prevention plan. Recommendations were developed using the USPSTF age appropriate recommendations. Education, counseling, and referrals were provided as needed. After Visit Summary printed and given to patient which includes a list of additional screenings\tests needed.    No follow-ups on file.    Maci Dyer, VALERIA                I offered to discuss advanced care planning, including how to pick a person who would make decisions for you if you were unable to make them for yourself, called a health care power of , and what kind of decisions you might make such as use of life sustaining treatments such as ventilators and tube feeding when faced with a life limiting illness recorded on a living will that they will need to know. (How you want to be cared for as you near the end of your natural life)     X Patient is interested in learning more about how to make advanced directives.  I provided them paperwork and offered to discuss this with them. He has a wife and 2 children that he would want making his decisions. He also understands LW and will take packet home to discuss with wife

## 2025-07-14 NOTE — PATIENT INSTRUCTIONS
Counseling and Referral of Other Preventative  (Italic type indicates deductible and co-insurance are waived)    Patient Name: Leonardo Pisano  Today's Date: 7/14/2025    Health Maintenance       Date Due Completion Date    Shingles Vaccine (1 of 2) Never done ---    RSV Vaccine (Age 60+ and Pregnant patients) (1 - Risk 60-74 years 1-dose series) Never done ---    COVID-19 Vaccine (1 - 2024-25 season) Never done ---    Hemoglobin A1c (Prediabetes) 05/08/2025 5/8/2024    Influenza Vaccine (1) 09/01/2025 11/17/2018    Colorectal Cancer Screening 06/11/2028 6/11/2025    Lipid Panel 06/02/2030 6/2/2025    TETANUS VACCINE 09/25/2030 9/25/2020        No orders of the defined types were placed in this encounter.      The following information is provided to all patients.  This information is to help you find resources for any of the problems found today that may be affecting your health:                  Living healthy guide: www.[facility].[place].gov      Understanding Diabetes: www.diabetes.org      Eating healthy: www.cdc.gov/healthyweight      CDC home safety checklist: www.cdc.gov/steadi/patient.html      Agency on Aging: www.goea.[place].gov      Alcoholics anonymous (AA): www.aa.org      Physical Activity: www.josé miguel.nih.gov/pr6bkzn      Tobacco use: www.quitwithusla.org

## 2025-08-21 ENCOUNTER — OFFICE VISIT (OUTPATIENT)
Dept: PAIN MEDICINE | Facility: CLINIC | Age: 70
End: 2025-08-21
Payer: MEDICARE

## 2025-08-21 VITALS
BODY MASS INDEX: 27.81 KG/M2 | SYSTOLIC BLOOD PRESSURE: 134 MMHG | TEMPERATURE: 98 F | WEIGHT: 216.69 LBS | DIASTOLIC BLOOD PRESSURE: 67 MMHG | HEART RATE: 47 BPM | HEIGHT: 74 IN

## 2025-08-21 DIAGNOSIS — G89.29 OTHER CHRONIC PAIN: ICD-10-CM

## 2025-08-21 DIAGNOSIS — G89.29 CHRONIC LEFT SHOULDER PAIN: ICD-10-CM

## 2025-08-21 DIAGNOSIS — M48.07 SPINAL STENOSIS, LUMBOSACRAL REGION: ICD-10-CM

## 2025-08-21 DIAGNOSIS — M54.16 LUMBAR RADICULOPATHY, CHRONIC: ICD-10-CM

## 2025-08-21 DIAGNOSIS — M54.9 DORSALGIA, UNSPECIFIED: Primary | ICD-10-CM

## 2025-08-21 DIAGNOSIS — M25.512 CHRONIC LEFT SHOULDER PAIN: ICD-10-CM

## 2025-08-21 DIAGNOSIS — M19.012 PRIMARY OSTEOARTHRITIS OF LEFT SHOULDER: ICD-10-CM

## 2025-08-21 DIAGNOSIS — M54.51 VERTEBROGENIC LOW BACK PAIN: ICD-10-CM

## 2025-08-21 DIAGNOSIS — M75.102 TEAR OF LEFT SUPRASPINATUS TENDON: ICD-10-CM

## 2025-08-21 DIAGNOSIS — M19.012 PRIMARY OSTEOARTHRITIS OF LEFT SHOULDER: Primary | ICD-10-CM

## 2025-08-21 PROCEDURE — 1101F PT FALLS ASSESS-DOCD LE1/YR: CPT | Mod: CPTII,S$GLB,,

## 2025-08-21 PROCEDURE — G2211 COMPLEX E/M VISIT ADD ON: HCPCS | Mod: S$GLB,,,

## 2025-08-21 PROCEDURE — 3008F BODY MASS INDEX DOCD: CPT | Mod: CPTII,S$GLB,,

## 2025-08-21 PROCEDURE — 1159F MED LIST DOCD IN RCRD: CPT | Mod: CPTII,S$GLB,,

## 2025-08-21 PROCEDURE — 1160F RVW MEDS BY RX/DR IN RCRD: CPT | Mod: CPTII,S$GLB,,

## 2025-08-21 PROCEDURE — 3288F FALL RISK ASSESSMENT DOCD: CPT | Mod: CPTII,S$GLB,,

## 2025-08-21 PROCEDURE — 99214 OFFICE O/P EST MOD 30 MIN: CPT | Mod: S$GLB,,,

## 2025-08-21 PROCEDURE — 3078F DIAST BP <80 MM HG: CPT | Mod: CPTII,S$GLB,,

## 2025-08-21 PROCEDURE — 4010F ACE/ARB THERAPY RXD/TAKEN: CPT | Mod: CPTII,S$GLB,,

## 2025-08-21 PROCEDURE — 99999 PR PBB SHADOW E&M-EST. PATIENT-LVL IV: CPT | Mod: PBBFAC,,,

## 2025-08-21 PROCEDURE — 3075F SYST BP GE 130 - 139MM HG: CPT | Mod: CPTII,S$GLB,,

## 2025-08-22 ENCOUNTER — TELEPHONE (OUTPATIENT)
Dept: PAIN MEDICINE | Facility: CLINIC | Age: 70
End: 2025-08-22
Payer: MEDICARE

## 2025-08-22 ENCOUNTER — PATIENT MESSAGE (OUTPATIENT)
Dept: PAIN MEDICINE | Facility: CLINIC | Age: 70
End: 2025-08-22
Payer: MEDICARE

## 2025-08-25 DIAGNOSIS — M75.102 TEAR OF LEFT SUPRASPINATUS TENDON: ICD-10-CM

## 2025-08-25 DIAGNOSIS — M19.012 PRIMARY OSTEOARTHRITIS OF LEFT SHOULDER: Primary | ICD-10-CM

## 2025-08-25 DIAGNOSIS — G89.29 CHRONIC LEFT SHOULDER PAIN: ICD-10-CM

## 2025-08-25 DIAGNOSIS — M25.512 CHRONIC LEFT SHOULDER PAIN: ICD-10-CM

## 2025-08-26 ENCOUNTER — PATIENT MESSAGE (OUTPATIENT)
Dept: PAIN MEDICINE | Facility: CLINIC | Age: 70
End: 2025-08-26
Payer: MEDICARE

## 2025-08-28 ENCOUNTER — HOSPITAL ENCOUNTER (OUTPATIENT)
Dept: RADIOLOGY | Facility: HOSPITAL | Age: 70
Discharge: HOME | End: 2025-08-28
Payer: MEDICARE

## 2025-08-28 DIAGNOSIS — M48.07 SPINAL STENOSIS, LUMBOSACRAL REGION: ICD-10-CM

## 2025-08-28 DIAGNOSIS — M54.9 DORSALGIA, UNSPECIFIED: ICD-10-CM

## 2025-08-28 DIAGNOSIS — M54.16 LUMBAR RADICULOPATHY, CHRONIC: ICD-10-CM

## 2025-08-28 PROCEDURE — 72148 MRI LUMBAR SPINE W/O DYE: CPT | Mod: 26,,, | Performed by: RADIOLOGY

## 2025-08-28 PROCEDURE — 72114 X-RAY EXAM L-S SPINE BENDING: CPT | Mod: TC

## 2025-08-28 PROCEDURE — 72114 X-RAY EXAM L-S SPINE BENDING: CPT | Mod: 26,,, | Performed by: RADIOLOGY

## 2025-08-28 PROCEDURE — 72148 MRI LUMBAR SPINE W/O DYE: CPT | Mod: TC

## 2025-09-03 ENCOUNTER — TELEPHONE (OUTPATIENT)
Dept: PAIN MEDICINE | Facility: CLINIC | Age: 70
End: 2025-09-03
Payer: MEDICARE

## 2025-09-03 ENCOUNTER — PATIENT MESSAGE (OUTPATIENT)
Dept: PAIN MEDICINE | Facility: CLINIC | Age: 70
End: 2025-09-03
Payer: MEDICARE

## 2025-09-05 ENCOUNTER — PATIENT MESSAGE (OUTPATIENT)
Dept: PAIN MEDICINE | Facility: CLINIC | Age: 70
End: 2025-09-05
Payer: MEDICARE

## 2025-09-05 ENCOUNTER — OFFICE VISIT (OUTPATIENT)
Dept: PAIN MEDICINE | Facility: CLINIC | Age: 70
End: 2025-09-05
Payer: MEDICARE

## 2025-09-05 DIAGNOSIS — M25.512 CHRONIC LEFT SHOULDER PAIN: Primary | ICD-10-CM

## 2025-09-05 DIAGNOSIS — M54.51 VERTEBROGENIC LOW BACK PAIN: ICD-10-CM

## 2025-09-05 DIAGNOSIS — G89.29 CHRONIC LEFT SHOULDER PAIN: Primary | ICD-10-CM

## (undated) DEVICE — SUT MONOCRYL 4-0 PS-1 UND

## (undated) DEVICE — SUT CTD VICRYL 3-0 CR/SH

## (undated) DEVICE — TRAY NEURO OMC

## (undated) DEVICE — CORD BIPOLAR 12 FOOT

## (undated) DEVICE — APPLICATOR CHLORAPREP ORN 26ML

## (undated) DEVICE — DRAPE STERI-DRAPE 1000 17X11IN

## (undated) DEVICE — KIT SURGIFLO HEMOSTATIC MATRIX

## (undated) DEVICE — BURR RND FLUT SFT TOUCH 2.0MM

## (undated) DEVICE — SPONGE COTTON TRAY 4X4IN

## (undated) DEVICE — ELECTRODE REM PLYHSV RETURN 9

## (undated) DEVICE — TUBE FRAZIER 5MM 2FT SOFT TIP

## (undated) DEVICE — DRAPE OPMI STERILE

## (undated) DEVICE — BUR BONE CUT MICRO TPS 3X3.8MM

## (undated) DEVICE — DRAPE T THYROID STERILE

## (undated) DEVICE — GAUZE SPONGE PEANUT STRL

## (undated) DEVICE — PACK ECLIPSE SET-UP W/O DRAPE

## (undated) DEVICE — BLADE 4IN EDGE INSULATED

## (undated) DEVICE — SYR IRRIGATION BULB STER 60ML

## (undated) DEVICE — DRESSING SURGICAL 1/2X1/2

## (undated) DEVICE — TAPE SILK 3IN

## (undated) DEVICE — DRAPE STERI INSTRUMENT 1018

## (undated) DEVICE — MARKER SKIN RULER STERILE

## (undated) DEVICE — NDL SPINAL 18GX3.5 SPINOCAN